# Patient Record
Sex: MALE | Race: OTHER | HISPANIC OR LATINO
[De-identification: names, ages, dates, MRNs, and addresses within clinical notes are randomized per-mention and may not be internally consistent; named-entity substitution may affect disease eponyms.]

---

## 2017-01-06 ENCOUNTER — APPOINTMENT (OUTPATIENT)
Dept: PULMONOLOGY | Facility: CLINIC | Age: 62
End: 2017-01-06

## 2017-01-06 VITALS
BODY MASS INDEX: 46.28 KG/M2 | SYSTOLIC BLOOD PRESSURE: 119 MMHG | HEART RATE: 85 BPM | HEIGHT: 66 IN | WEIGHT: 288 LBS | DIASTOLIC BLOOD PRESSURE: 83 MMHG

## 2017-01-06 DIAGNOSIS — G47.33 OBSTRUCTIVE SLEEP APNEA (ADULT) (PEDIATRIC): ICD-10-CM

## 2017-01-06 DIAGNOSIS — F17.200 NICOTINE DEPENDENCE, UNSPECIFIED, UNCOMPLICATED: ICD-10-CM

## 2017-01-06 RX ORDER — METFORMIN HYDROCHLORIDE 500 MG/1
500 TABLET, COATED ORAL
Refills: 0 | Status: ACTIVE | COMMUNITY

## 2017-01-06 RX ORDER — PRAVASTATIN SODIUM 40 MG/1
40 TABLET ORAL
Refills: 0 | Status: ACTIVE | COMMUNITY

## 2017-01-06 RX ORDER — LOSARTAN POTASSIUM AND HYDROCHLOROTHIAZIDE 12.5; 5 MG/1; MG/1
50-12.5 TABLET ORAL
Refills: 0 | Status: ACTIVE | COMMUNITY

## 2017-01-06 RX ORDER — AMLODIPINE BESYLATE 10 MG/1
10 TABLET ORAL
Refills: 0 | Status: ACTIVE | COMMUNITY

## 2017-03-07 ENCOUNTER — APPOINTMENT (OUTPATIENT)
Dept: PODIATRY | Facility: CLINIC | Age: 62
End: 2017-03-07

## 2017-03-07 DIAGNOSIS — G89.29 LOW BACK PAIN: ICD-10-CM

## 2017-03-07 DIAGNOSIS — M54.5 LOW BACK PAIN: ICD-10-CM

## 2017-03-07 DIAGNOSIS — I83.93 ASYMPTOMATIC VARICOSE VEINS OF BILATERAL LOWER EXTREMITIES: ICD-10-CM

## 2017-03-07 DIAGNOSIS — E08.9 DIABETES MELLITUS DUE TO UNDERLYING CONDITION W/OUT COMPLICATIONS: ICD-10-CM

## 2017-03-07 DIAGNOSIS — B35.1 TINEA UNGUIUM: ICD-10-CM

## 2017-03-07 DIAGNOSIS — L85.3 XEROSIS CUTIS: ICD-10-CM

## 2017-03-07 RX ORDER — HYDROCORTISONE 1 %
12 CREAM (GRAM) TOPICAL
Qty: 1 | Refills: 3 | Status: ACTIVE | COMMUNITY
Start: 2017-03-07 | End: 1900-01-01

## 2017-03-07 RX ORDER — NYSTATIN 100000 [USP'U]/G
100000 CREAM TOPICAL
Qty: 1 | Refills: 3 | Status: ACTIVE | COMMUNITY
Start: 2017-03-07 | End: 1900-01-01

## 2017-05-12 PROBLEM — J44.9 CHRONIC OBSTRUCTIVE PULMONARY DISEASE, UNSPECIFIED COPD TYPE: Status: ACTIVE | Noted: 2017-05-12

## 2017-05-19 ENCOUNTER — APPOINTMENT (OUTPATIENT)
Dept: PULMONOLOGY | Facility: CLINIC | Age: 62
End: 2017-05-19

## 2017-05-19 DIAGNOSIS — J44.9 CHRONIC OBSTRUCTIVE PULMONARY DISEASE, UNSPECIFIED: ICD-10-CM

## 2017-06-13 ENCOUNTER — APPOINTMENT (OUTPATIENT)
Dept: PODIATRY | Facility: CLINIC | Age: 62
End: 2017-06-13

## 2017-06-13 ENCOUNTER — OUTPATIENT (OUTPATIENT)
Dept: OUTPATIENT SERVICES | Facility: HOSPITAL | Age: 62
LOS: 1 days | Discharge: HOME | End: 2017-06-13

## 2017-06-13 RX ORDER — AMMONIUM LACTATE 12 %
12 CREAM (GRAM) TOPICAL TWICE DAILY
Qty: 1 | Refills: 3 | Status: ACTIVE | COMMUNITY
Start: 2017-06-13 | End: 1900-01-01

## 2017-06-28 DIAGNOSIS — B35.1 TINEA UNGUIUM: ICD-10-CM

## 2017-07-28 ENCOUNTER — APPOINTMENT (OUTPATIENT)
Dept: PULMONOLOGY | Facility: CLINIC | Age: 62
End: 2017-07-28

## 2017-10-27 ENCOUNTER — APPOINTMENT (OUTPATIENT)
Dept: PULMONOLOGY | Facility: CLINIC | Age: 62
End: 2017-10-27

## 2018-03-23 ENCOUNTER — APPOINTMENT (OUTPATIENT)
Dept: PULMONOLOGY | Facility: CLINIC | Age: 63
End: 2018-03-23

## 2018-03-23 ENCOUNTER — OUTPATIENT (OUTPATIENT)
Dept: OUTPATIENT SERVICES | Facility: HOSPITAL | Age: 63
LOS: 1 days | Discharge: HOME | End: 2018-03-23

## 2018-03-23 VITALS
BODY MASS INDEX: 46.61 KG/M2 | SYSTOLIC BLOOD PRESSURE: 117 MMHG | WEIGHT: 290 LBS | HEART RATE: 75 BPM | HEIGHT: 66 IN | DIASTOLIC BLOOD PRESSURE: 75 MMHG

## 2018-03-23 DIAGNOSIS — Z87.891 PERSONAL HISTORY OF NICOTINE DEPENDENCE: ICD-10-CM

## 2018-03-23 RX ORDER — ALBUTEROL SULFATE 90 UG/1
108 (90 BASE) AEROSOL, METERED RESPIRATORY (INHALATION) EVERY 4 HOURS
Qty: 1 | Refills: 2 | Status: ACTIVE | COMMUNITY
Start: 2018-03-23 | End: 1900-01-01

## 2018-03-30 ENCOUNTER — OUTPATIENT (OUTPATIENT)
Dept: OUTPATIENT SERVICES | Facility: HOSPITAL | Age: 63
LOS: 1 days | Discharge: HOME | End: 2018-03-30

## 2018-03-30 DIAGNOSIS — F17.210 NICOTINE DEPENDENCE, CIGARETTES, UNCOMPLICATED: ICD-10-CM

## 2018-04-14 ENCOUNTER — OUTPATIENT (OUTPATIENT)
Dept: OUTPATIENT SERVICES | Facility: HOSPITAL | Age: 63
LOS: 1 days | Discharge: HOME | End: 2018-04-14

## 2018-04-16 DIAGNOSIS — G47.33 OBSTRUCTIVE SLEEP APNEA (ADULT) (PEDIATRIC): ICD-10-CM

## 2021-05-07 ENCOUNTER — INPATIENT (INPATIENT)
Facility: HOSPITAL | Age: 66
LOS: 1 days | Discharge: HOME HEALTH | End: 2021-05-09
Attending: INTERNAL MEDICINE | Admitting: INTERNAL MEDICINE
Payer: MEDICARE

## 2021-05-07 VITALS
DIASTOLIC BLOOD PRESSURE: 72 MMHG | OXYGEN SATURATION: 100 % | HEART RATE: 72 BPM | SYSTOLIC BLOOD PRESSURE: 130 MMHG | HEIGHT: 68 IN | WEIGHT: 291.89 LBS | RESPIRATION RATE: 20 BRPM | TEMPERATURE: 98 F

## 2021-05-07 LAB
ALBUMIN SERPL ELPH-MCNC: 4.5 G/DL — SIGNIFICANT CHANGE UP (ref 3.5–5.2)
ALP SERPL-CCNC: 92 U/L — SIGNIFICANT CHANGE UP (ref 30–115)
ALT FLD-CCNC: 28 U/L — SIGNIFICANT CHANGE UP (ref 0–41)
ANION GAP SERPL CALC-SCNC: 13 MMOL/L — SIGNIFICANT CHANGE UP (ref 7–14)
ANION GAP SERPL CALC-SCNC: 15 MMOL/L — HIGH (ref 7–14)
ANISOCYTOSIS BLD QL: SIGNIFICANT CHANGE UP
APTT BLD: 32.7 SEC — SIGNIFICANT CHANGE UP (ref 27–39.2)
AST SERPL-CCNC: 30 U/L — SIGNIFICANT CHANGE UP (ref 0–41)
BASOPHILS # BLD AUTO: 0 K/UL — SIGNIFICANT CHANGE UP (ref 0–0.2)
BASOPHILS NFR BLD AUTO: 0 % — SIGNIFICANT CHANGE UP (ref 0–1)
BILIRUB SERPL-MCNC: 0.6 MG/DL — SIGNIFICANT CHANGE UP (ref 0.2–1.2)
BLD GP AB SCN SERPL QL: SIGNIFICANT CHANGE UP
BUN SERPL-MCNC: 20 MG/DL — SIGNIFICANT CHANGE UP (ref 10–20)
BUN SERPL-MCNC: 21 MG/DL — HIGH (ref 10–20)
CALCIUM SERPL-MCNC: 9.1 MG/DL — SIGNIFICANT CHANGE UP (ref 8.5–10.1)
CALCIUM SERPL-MCNC: 9.3 MG/DL — SIGNIFICANT CHANGE UP (ref 8.5–10.1)
CHLORIDE SERPL-SCNC: 100 MMOL/L — SIGNIFICANT CHANGE UP (ref 98–110)
CHLORIDE SERPL-SCNC: 99 MMOL/L — SIGNIFICANT CHANGE UP (ref 98–110)
CO2 SERPL-SCNC: 24 MMOL/L — SIGNIFICANT CHANGE UP (ref 17–32)
CO2 SERPL-SCNC: 28 MMOL/L — SIGNIFICANT CHANGE UP (ref 17–32)
CREAT SERPL-MCNC: 1 MG/DL — SIGNIFICANT CHANGE UP (ref 0.7–1.5)
CREAT SERPL-MCNC: 1 MG/DL — SIGNIFICANT CHANGE UP (ref 0.7–1.5)
EOSINOPHIL # BLD AUTO: 0.29 K/UL — SIGNIFICANT CHANGE UP (ref 0–0.7)
EOSINOPHIL NFR BLD AUTO: 3.5 % — SIGNIFICANT CHANGE UP (ref 0–8)
GIANT PLATELETS BLD QL SMEAR: PRESENT — SIGNIFICANT CHANGE UP
GLUCOSE SERPL-MCNC: 129 MG/DL — HIGH (ref 70–99)
GLUCOSE SERPL-MCNC: 136 MG/DL — HIGH (ref 70–99)
HCT VFR BLD CALC: 46.8 % — SIGNIFICANT CHANGE UP (ref 42–52)
HGB BLD-MCNC: 14.5 G/DL — SIGNIFICANT CHANGE UP (ref 14–18)
INR BLD: 1.08 RATIO — SIGNIFICANT CHANGE UP (ref 0.65–1.3)
IRON SATN MFR SERPL: 12 % — LOW (ref 15–50)
IRON SATN MFR SERPL: 38 UG/DL — SIGNIFICANT CHANGE UP (ref 35–150)
LDH SERPL L TO P-CCNC: 224 U/L — SIGNIFICANT CHANGE UP (ref 50–242)
LYMPHOCYTES # BLD AUTO: 2.31 K/UL — SIGNIFICANT CHANGE UP (ref 1.2–3.4)
LYMPHOCYTES # BLD AUTO: 28.1 % — SIGNIFICANT CHANGE UP (ref 20.5–51.1)
MANUAL SMEAR VERIFICATION: SIGNIFICANT CHANGE UP
MCHC RBC-ENTMCNC: 22.9 PG — LOW (ref 27–31)
MCHC RBC-ENTMCNC: 31 G/DL — LOW (ref 32–37)
MCV RBC AUTO: 73.8 FL — LOW (ref 80–94)
MICROCYTES BLD QL: SIGNIFICANT CHANGE UP
MONOCYTES # BLD AUTO: 0.29 K/UL — SIGNIFICANT CHANGE UP (ref 0.1–0.6)
MONOCYTES NFR BLD AUTO: 3.5 % — SIGNIFICANT CHANGE UP (ref 1.7–9.3)
NEUTROPHILS # BLD AUTO: 5.34 K/UL — SIGNIFICANT CHANGE UP (ref 1.4–6.5)
NEUTROPHILS NFR BLD AUTO: 64 % — SIGNIFICANT CHANGE UP (ref 42.2–75.2)
NEUTS BAND # BLD: 0.9 % — SIGNIFICANT CHANGE UP (ref 0–6)
OVALOCYTES BLD QL SMEAR: SLIGHT — SIGNIFICANT CHANGE UP
PLAT MORPH BLD: ABNORMAL
PLATELET # BLD AUTO: 5 K/UL — CRITICAL LOW (ref 130–400)
POIKILOCYTOSIS BLD QL AUTO: SLIGHT — SIGNIFICANT CHANGE UP
POLYCHROMASIA BLD QL SMEAR: SLIGHT — SIGNIFICANT CHANGE UP
POTASSIUM SERPL-MCNC: 4.1 MMOL/L — SIGNIFICANT CHANGE UP (ref 3.5–5)
POTASSIUM SERPL-MCNC: 4.2 MMOL/L — SIGNIFICANT CHANGE UP (ref 3.5–5)
POTASSIUM SERPL-SCNC: 4.1 MMOL/L — SIGNIFICANT CHANGE UP (ref 3.5–5)
POTASSIUM SERPL-SCNC: 4.2 MMOL/L — SIGNIFICANT CHANGE UP (ref 3.5–5)
PROT SERPL-MCNC: 7.5 G/DL — SIGNIFICANT CHANGE UP (ref 6–8)
PROTHROM AB SERPL-ACNC: 12.4 SEC — SIGNIFICANT CHANGE UP (ref 9.95–12.87)
RBC # BLD: 6.24 M/UL — HIGH (ref 4.7–6.1)
RBC # BLD: 6.34 M/UL — HIGH (ref 4.7–6.1)
RBC # FLD: 17.6 % — HIGH (ref 11.5–14.5)
RBC BLD AUTO: ABNORMAL
RETICS #: 98.6 K/UL — SIGNIFICANT CHANGE UP (ref 25–125)
RETICS/RBC NFR: 1.6 % — HIGH (ref 0.5–1.5)
SARS-COV-2 RNA SPEC QL NAA+PROBE: SIGNIFICANT CHANGE UP
SODIUM SERPL-SCNC: 139 MMOL/L — SIGNIFICANT CHANGE UP (ref 135–146)
SODIUM SERPL-SCNC: 140 MMOL/L — SIGNIFICANT CHANGE UP (ref 135–146)
TIBC SERPL-MCNC: 316 UG/DL — SIGNIFICANT CHANGE UP (ref 220–430)
UIBC SERPL-MCNC: 278 UG/DL — SIGNIFICANT CHANGE UP (ref 110–370)
WBC # BLD: 8.23 K/UL — SIGNIFICANT CHANGE UP (ref 4.8–10.8)
WBC # FLD AUTO: 8.23 K/UL — SIGNIFICANT CHANGE UP (ref 4.8–10.8)

## 2021-05-07 PROCEDURE — 70450 CT HEAD/BRAIN W/O DYE: CPT | Mod: 26

## 2021-05-07 PROCEDURE — 99223 1ST HOSP IP/OBS HIGH 75: CPT

## 2021-05-07 PROCEDURE — 99285 EMERGENCY DEPT VISIT HI MDM: CPT

## 2021-05-07 PROCEDURE — 76705 ECHO EXAM OF ABDOMEN: CPT | Mod: 26

## 2021-05-07 PROCEDURE — 88189 FLOWCYTOMETRY/READ 16 & >: CPT

## 2021-05-07 RX ORDER — CHLORHEXIDINE GLUCONATE 213 G/1000ML
1 SOLUTION TOPICAL
Refills: 0 | Status: DISCONTINUED | OUTPATIENT
Start: 2021-05-07 | End: 2021-05-09

## 2021-05-07 RX ORDER — METFORMIN HYDROCHLORIDE 850 MG/1
0 TABLET ORAL
Qty: 0 | Refills: 0 | DISCHARGE

## 2021-05-07 RX ORDER — PANTOPRAZOLE SODIUM 20 MG/1
40 TABLET, DELAYED RELEASE ORAL
Refills: 0 | Status: DISCONTINUED | OUTPATIENT
Start: 2021-05-07 | End: 2021-05-09

## 2021-05-07 RX ORDER — HYDROCHLOROTHIAZIDE 25 MG
25 TABLET ORAL DAILY
Refills: 0 | Status: DISCONTINUED | OUTPATIENT
Start: 2021-05-07 | End: 2021-05-09

## 2021-05-07 RX ORDER — IMMUNE GLOBULIN (HUMAN) 10 G/100ML
95 INJECTION INTRAVENOUS; SUBCUTANEOUS ONCE
Refills: 0 | Status: COMPLETED | OUTPATIENT
Start: 2021-05-07 | End: 2021-05-07

## 2021-05-07 RX ORDER — LOSARTAN POTASSIUM 100 MG/1
100 TABLET, FILM COATED ORAL DAILY
Refills: 0 | Status: DISCONTINUED | OUTPATIENT
Start: 2021-05-07 | End: 2021-05-09

## 2021-05-07 RX ORDER — ASPIRIN/CALCIUM CARB/MAGNESIUM 324 MG
81 TABLET ORAL DAILY
Refills: 0 | Status: DISCONTINUED | OUTPATIENT
Start: 2021-05-07 | End: 2021-05-09

## 2021-05-07 RX ORDER — AMLODIPINE BESYLATE 2.5 MG/1
0 TABLET ORAL
Qty: 0 | Refills: 0 | DISCHARGE

## 2021-05-07 RX ORDER — ATORVASTATIN CALCIUM 80 MG/1
0 TABLET, FILM COATED ORAL
Qty: 0 | Refills: 0 | DISCHARGE

## 2021-05-07 RX ORDER — FLUTICASONE PROPIONATE 50 MCG
1 SPRAY, SUSPENSION NASAL DAILY
Refills: 0 | Status: DISCONTINUED | OUTPATIENT
Start: 2021-05-07 | End: 2021-05-09

## 2021-05-07 RX ORDER — LOSARTAN POTASSIUM 100 MG/1
0 TABLET, FILM COATED ORAL
Qty: 0 | Refills: 0 | DISCHARGE

## 2021-05-07 RX ORDER — HYDROCHLOROTHIAZIDE 25 MG
0 TABLET ORAL
Qty: 0 | Refills: 0 | DISCHARGE

## 2021-05-07 RX ORDER — ATORVASTATIN CALCIUM 80 MG/1
40 TABLET, FILM COATED ORAL DAILY
Refills: 0 | Status: DISCONTINUED | OUTPATIENT
Start: 2021-05-07 | End: 2021-05-09

## 2021-05-07 RX ORDER — AMLODIPINE BESYLATE 2.5 MG/1
5 TABLET ORAL DAILY
Refills: 0 | Status: DISCONTINUED | OUTPATIENT
Start: 2021-05-07 | End: 2021-05-09

## 2021-05-07 RX ADMIN — IMMUNE GLOBULIN (HUMAN) 158.33 GRAM(S): 10 INJECTION INTRAVENOUS; SUBCUTANEOUS at 23:18

## 2021-05-07 RX ADMIN — Medication 130 MILLIGRAM(S): at 21:52

## 2021-05-07 NOTE — H&P ADULT - ATTENDING COMMENTS
HPI:  66 years old male with PMH of COPD (not on home O2), MAICOL (off CPAP) , T2DM, HTN, DLD, referred by PMD for low platelet count. Outpatient labs noted thrombocytopenia of 8, found to be 5 on admission. June 2020 noted to be 100 and in Jan 2021 noted to be 53. He is asymptomatic. No reports of bleeding or easy bruising. No family history of cancer, thinks his niece may have lupus but otherwise brothers and sisters reported healthy.   he has lost some weight, 4 lbs over the last 3-4 months but it has been intentional.     Heme Onc following, concerned for ITP. given IVIG and prednisone 130mg. Peripheral smear confirmed thrombocytopenia, occasional schistocytes (non-specific). CTH and US LUQ performed and pending.       REVIEW OF SYSTEMS:  CONSTITUTIONAL:  No weakness, fevers, chills, night sweats, weight loss  EYES/ENT: No visual changes. No vertigo or dysphagia  NECK: No neck pain or stiffness  RESPIRATORY: No cough, wheezing, hemoptysis. No shortness of breath  CARDIOVASCULAR: No chest pain or palpitations. No lower extremity edema  GASTROINTESTINAL: No abdominal pain. No nausea, vomiting, diarrhea, or hematemesis  GENITOURINARY: No dysuria or hematuria   NEUROLOGICAL: No focal numbness or weakness  SKIN: No rashes or itching  HEMATOLOGIC: No easy bruising or prolonged bleeding.      PHYSICAL EXAM:  GENERAL: NAD, obese, Non-toxic, stated age   HEAD:  Atraumatic, Normocephalic  EYES: EOMI, Sclera White   NECK: Supple, No JVD  CHEST/LUNG: Clear to auscultation bilaterally; No wheezing, rhonchi, or crackles  HEART: Regular rate and rhythm; s1, s2, No murmurs, rubs, or gallops  ABDOMEN: Soft, Nontender, distended (but at baseline); Bowel sounds present, No rebound or guarding noted   EXTREMITIES:  No lower extremity edema or calf tenderness to palpation.  No clubbing or cyanosis  PSYCH: AAOx3, pleasant, cooperative, not anxious  NEUROLOGY: non-focal  SKIN: No rashes or lesions. Venous stasis dermatitis and dry skin on b/l legs.       ASSESSMENT AND PLAN:  Thrombocytopenia: suspected ITP, s/p IVIG and prednisone.   -Heme-Onc following: Send LDH, Hapto, ret count, emelia test  -Check HIV, Hep B and Hep C  -Check HORACIO, RF  -Check EBV, CMV, Parvovirus . Also send peripheral flow cytometry   -Check heparin PF4 Ab   -f/u CTH and LUQ Sono  -Trend CBC, monitor closely for bleeding. Hold off on plt transfusion for now, unless actively bleeding.     COPD: not in an exacerbation, cont albuterol PRN     HTN/ HLD: cont with home medications     MAICOL: patient should follow up with Sleep medicine as an out patient    DVT ppx: Sequentials for now until platelet count improves.   GI ppx: Not indicated  GOC: Full code.    My note supersedes the residents in the event of a discrepancy.

## 2021-05-07 NOTE — H&P ADULT - HISTORY OF PRESENT ILLNESS
66 years old male with PMH of DM, HTN, DLD, MAICOL referred by PMD for low platelet count.  Plt count was 8 on blood work that was done yesterday. Patient has no complaints of bleeding - nose bleeds, melena , skin rash/ bleeding spots. No h/o recent infections. No recent change in medications. No OTC meds. No recent fevers, weight loss.   He received his Moderna 2 dose COVID vaccine in Jan and feb 2021.     On review of HIE in June 2020 his plt count was low 100s and in Jan 2021 plt count was 53. WBC and Hb has been normal.   Patient is not aware of prior low platelet counts    In the ED, vitals were WNL, Labs were significant for platelet count of 5. peripheral smear showed normal RBC- normocytic, 1-2 schistocytes noted (non specific), some target cells. WBC- normal looking. No blasts and Platelet- decreased in number. No clumps noted. Some large platelets seen. Heme/onc consulted , Pt received IVIG and prednisone in ED. To be admitted under medicine for further workup.  66 years old male with PMH of COPD (not on home O2), MAICOL (off CPAP) , T2DM, HTN, DLD, referred by PMD for low platelet count.  Pt went to visit his PMD for routine out patient visit and was found to have plt count of 8. Pt reports that he has no prior history of thrombocytopenia and does not report any bruising or bleeding - nose bleeds, melena , skin rash/ bleeding spot. Has no family history of thrombocytopenia. Has been taking ibuprofen OTC recently for headaches.  He received his Moderna 2 dose COVID vaccine in Jan and feb 2021.   On review of HIE in June 2020 his plt count was low 100s and in Jan 2021 plt count was 53. WBC and Hb has been normal.  Patient is not aware of prior low platelet counts.  Denies any fever, n/v/d, SOB, chest pain or any recent weight changes.     In the ED, vitals were WNL, Labs were significant for platelet count of 5. peripheral smear showed normal RBC- normocytic, 1-2 schistocytes noted (non specific), some target cells. WBC- normal looking. No blasts and Platelet- decreased in number. No clumps noted. Some large platelets seen. Heme/onc consulted , Pt received IVIG and prednisone in ED. To be admitted under medicine for further workup.

## 2021-05-07 NOTE — ED PROVIDER NOTE - PROGRESS NOTE DETAILS
d/w hemonc, advised 1 unit for now. will check smear and call back with any further recommendations as per hemonc, cancel plat transfusion. will contact inpt team for further rec

## 2021-05-07 NOTE — CONSULT NOTE ADULT - ASSESSMENT
67 y/o male with PMH of DM, HTN, DLD, MACIOL referred by PMD for low platelet count of 8 on blood work that was done yesterday. Repeat CBC in ER today shows platelet count of 5.     He got Moderna COVID vaccine in Jan and Feb 2021.   On review of HIE in June 2020 his plt count was low 100s and in Jan 2021 plt count was 53. WBC and Hb has been normal.       1. Thrombocytopenia with normal WBC and Hb- likely 2/2 to ITP.   Peripheral smear reviewed- shows normal RBC- normocytic, 1-2 schistocytes noted (non specific), some target cells.   WBC- normal looking. No blasts.  Platelet- decreased in number. No clumps noted. Some large platelets seen.     Please get CT head to r/o bleed  No need for platelet transfusion unless patient is actively bleeding .     Please send LDH, Hapto, ret count, emelia test  Check HIV, Hep B and Hep C  Check HORACIO, RF  Check EBV, CMV, Parvovirus . Also send peripheral flow cytometry   Order US abdomen to r/o splenomegaly.   Also check heparin PF4 Ab     Will start him on prednisone 1mg/kg- 130 mg PO with protonix 40   Will give him 1 dose IVIG 1g/kg today     Plan was discussed with Medical admitting resident   Repeat CBC tomorrow.

## 2021-05-07 NOTE — ED PROVIDER NOTE - CLINICAL SUMMARY MEDICAL DECISION MAKING FREE TEXT BOX
patient with severe thrombocytopenia. patient transfused platelets. hematology consulted. patient to be admitted for evaluation of low platelets.

## 2021-05-07 NOTE — ED PROVIDER NOTE - OBJECTIVE STATEMENT
pt with pmhx htn, dm, hyperlip, obesity presents to ED for low plat count incidentally noted on routine labs with pmd yesterday. Denies fever/chill/HA/dizziness/chest pain/palpitation/sob/abd pain/n/v/d/ black stool/bloody stool/urinary sxs. completed covid19 vaccine series in 2/2021. per chelita umaña, labs since last year showed downward trending plat count

## 2021-05-07 NOTE — H&P ADULT - NSHPPHYSICALEXAM_GEN_ALL_CORE
PHYSICAL EXAM:  GENERAL: NAD, lying in bed comfortably  HEAD:  Atraumatic, Normocephalic  EYES: EOMI, PERRLA, conjunctiva and sclera clear  ENT: Moist mucous membranes  NECK: Supple, No JVD  CHEST/LUNG: Clear to auscultation bilaterally; No rales, rhonchi, wheezing, or rubs. Unlabored respirations  HEART: Regular rate and rhythm; No murmurs, rubs, or gallops  ABDOMEN: Bowel sounds present; Soft, Nontender, Nondistended. No hepatomegally  EXTREMITIES:  2+ Peripheral Pulses, brisk capillary refill. No clubbing, cyanosis, or edema  NERVOUS SYSTEM:  Alert & Oriented X3, speech clear. No deficits   MSK: FROM all 4 extremities, full and equal strength  SKIN: No rashes or lesions PHYSICAL EXAM:  GENERAL: NAD, lying in bed comfortably  HEAD:  Atraumatic, Normocephalic  EYES: EOMI, PERRLA, conjunctiva and sclera clear  ENT: Moist mucous membranes  NECK: Supple, No JVD  CHEST/LUNG: Clear to auscultation bilaterally; No rales, rhonchi, wheezing, or rubs. Unlabored respirations  HEART: Regular rate and rhythm; No murmurs, rubs, or gallops  ABDOMEN: Bowel sounds present; Soft, Nontender, Nondistended. No hepatomegally  EXTREMITIES:  2+ Peripheral Pulses, brisk capillary refill. No clubbing, cyanosis, or edema, chronic vernous stasis b/l legs  NERVOUS SYSTEM:  Alert & Oriented X3, speech clear. No deficits   MSK: FROM all 4 extremities, full and equal strength  SKIN: No rashes or lesions

## 2021-05-07 NOTE — H&P ADULT - NSHPLABSRESULTS_GEN_ALL_CORE
(05-07 @ 16:38)                      14.5  8.23 )-----------( 5                 46.8    Neutrophils = 5.34 (64.0%)  Lymphocytes = 2.31 (28.1%)  Eosinophils = 0.29 (3.5%)  Basophils = 0.00 (0.0%)  Monocytes = 0.29 (3.5%)  Bands = 0.9%    05-07    139  |  100  |  21<H>  ----------------------------<  129<H>  4.1   |  24  |  1.0    Ca    9.1      07 May 2021 16:38      ( 07 May 2021 16:38 )   PT: 12.40 sec;   INR: 1.08 ratio;       PTT:32.7 sec

## 2021-05-07 NOTE — ED PROVIDER NOTE - ATTENDING CONTRIBUTION TO CARE
patient sent for thrombocytopenia with platelets of 8. he is asymptomatic, had 3 month fu with pmd who had repeat labs which showed abn and was told tocome to ed. denies headache, vomiting, bruising, hematuria, blood in stool or swelling. denies new meds, not on aspirin anymore.  plan is to obtain labs and consult heme onc.

## 2021-05-07 NOTE — H&P ADULT - ASSESSMENT
Impression:  Thrombocytopenia likely secondary to ITP  HO HTN / DLD  HO T2DM    # Thrombocytopenia   - DDX: ITP, DITP, chronic alcoholism, infectious, nutritional deficiency  - no plt clumping on peripheral smear  - will get HIV, HCV, HBV  for ITP  - Check HORACIO, RF to r/o CTD  - LDH, Hapto, ret count, emelia test to r/o anemia or thrombotic microangiopathy   - Check EBV, CMV, Parvovirus  to r/o infectious  - heparin PF4 Ab to r/o HIT as per heme/onc  - prednisone 1mg/kg  - 130mg PO w/ protonix 40mg PO qd  - IVIG as per heme/onc  - get CTH to r/o hemorrhage  - transfuse platelets if signs of bleeding, hold of transfusion till then  - avoid heparin  - f./u CBC q12.    # HO HTN / DLD  # HOT2D --  hold oral meds;  check fs;  start and adjust insulin s/s prn     # Diet: DASH / TLC  # DVT Prophylaxis: SCD  # GI Prophylaxis: Protonix  # Activity: IAT  # IV Fluids:  # Dispo: Acute  # Code Status: fullcode  # CHG wash  66 years old male with PMH of COPD (not on home O2), MAICOL (off CPAP) , T2DM, HTN, DLD, referred by PMD for low platelet count.    Impression:  Thrombocytopenia likely secondary to ITP  HO HTN / DLD  HO T2DM    # Thrombocytopenia   - DDX: ITP, DITP, chronic alcoholism, infectious, nutritional deficiency  - no plt clumping on peripheral smear  - will get HIV, HCV, HBV  for ITP  - Check HORACIO, RF to r/o CTD  - LDH, Hapto, ret count, emelia test to r/o anemia or thrombotic microangiopathy   - Check EBV, CMV, Parvovirus  to r/o infectious  - get vitb12 and folate  - heparin PF4 Ab to r/o HIT as per heme/onc  - prednisone 1mg/kg  - 130mg PO w/ protonix 40mg PO qd  - IVIG as per heme/onc  - get CTH to r/o hemorrhage  - transfuse platelets if signs of bleeding, hold of transfusion till then  - avoid heparin  - f./u CBC q12.    # HO HTN / DLD - c/w HCTZ, losartan, amlodipine and statin  # HO T2DM --  hold oral meds;  check fs;  start and adjust insulin s/s prn     # Diet: DASH / TLC  # DVT Prophylaxis: SCD  # GI Prophylaxis: Protonix  # Activity: IAT  # Dispo: Acute  # Code Status: fullcode

## 2021-05-07 NOTE — CONSULT NOTE ADULT - SUBJECTIVE AND OBJECTIVE BOX
Patient is a 66y old  Male who presents with a chief complaint of     HPI: 65 y/o male with PMH of DM, HTN, DLD, MAICOL referred by PMD for low platelet count of 8 on blood work that was done yesterday. Patient has no complaints of bleeding - nose bleeds, melena , skin rash/ bleeding spots. No h/o recent infections. No recent change in medications. No OTC meds. No recent fevers, weight loss.   He received his Moderna 2 dose COVID vaccine in Jan and feb 2021.     On review of HIE in June 2020 his plt count was low 100s and in Jan 2021 plt count was 53. WBC and Hb has been normal.   Patient is not aware of prior low platelet counts.        ROS:  Negative except for:    PAST MEDICAL & SURGICAL HISTORY:      SOCIAL HISTORY: Active smoker  No alcohol abuse- rarely drinks alcohol   No recreational drug use    FAMILY HISTORY: No malignancy or blood related problems known in family       MEDICATIONS  (STANDING):    MEDICATIONS  (PRN):      Weight (kg): 132.4 (05-07-21 @ 15:39)  Allergies    Allergy Status Unknown    Intolerances        Vital Signs Last 24 Hrs  T(C): 36.8 (07 May 2021 15:39), Max: 36.8 (07 May 2021 15:39)  T(F): 98.3 (07 May 2021 15:39), Max: 98.3 (07 May 2021 15:39)  HR: 72 (07 May 2021 15:39) (72 - 72)  BP: 130/72 (07 May 2021 15:39) (130/72 - 130/72)  BP(mean): --  RR: 20 (07 May 2021 15:39) (20 - 20)  SpO2: 100% (07 May 2021 15:39) (100% - 100%)    PHYSICAL EXAM  General: adult in NAD, Obese   ORAL: No bleeding spots noted   Neck: supple  CV: normal S1/S2 with no murmur rubs or gallops  Lungs: positive air movement b/l ant lungs  Abdomen: soft non-tender. No hepatosplenomegaly  Ext: no clubbing cyanosis or edema  Skin: no rashes and no petechiae were seen   Neuro: alert and oriented X 4, no focal deficits      LABS:                          14.5   8.23  )-----------( 5        ( 07 May 2021 16:38 )             46.8         Mean Cell Volume : 73.8 fL  Mean Cell Hemoglobin : 22.9 pg  Mean Cell Hemoglobin Concentration : 31.0 g/dL  Auto Neutrophil # : 5.34 K/uL  Auto Lymphocyte # : 2.31 K/uL  Auto Monocyte # : 0.29 K/uL  Auto Eosinophil # : 0.29 K/uL  Auto Basophil # : 0.00 K/uL  Auto Neutrophil % : 64.0 %  Auto Lymphocyte % : 28.1 %  Auto Monocyte % : 3.5 %  Auto Eosinophil % : 3.5 %  Auto Basophil % : 0.0 %      Serial CBC's  05-07 @ 16:38  Hct-46.8 / Hgb-14.5 / Plat-5 / RBC-6.34 / WBC-8.23      05-07    139  |  100  |  21<H>  ----------------------------<  129<H>  4.1   |  24  |  1.0    Ca    9.1      07 May 2021 16:38        PT/INR - ( 07 May 2021 16:38 )   PT: 12.40 sec;   INR: 1.08 ratio         PTT - ( 07 May 2021 16:38 )  PTT:32.7 sec

## 2021-05-07 NOTE — ED PROVIDER NOTE - PHYSICAL EXAMINATION
CONSTITUTIONAL: Well-appearing; well-nourished; in no apparent distress.   CARDIOVASCULAR: Normal S1, S2; no murmurs, rubs, or gallops.   RESPIRATORY: Normal chest excursion with respiration; breath sounds clear and equal bilaterally; no wheezes, rhonchi, or rales.  GI/: non-distended; non-tender; no palpable organomegaly.   MS: No evidence of trauma or deformity. Non-tender to palpation.   SKIN: Normal for age and race; warm; dry; good turgor; no apparent lesions or exudate.   NEURO/PSYCH: A & O x 4; grossly unremarkable. mood and manner are appropriate.

## 2021-05-08 LAB
A1C WITH ESTIMATED AVERAGE GLUCOSE RESULT: 7.8 % — HIGH (ref 4–5.6)
ALBUMIN SERPL ELPH-MCNC: 3.7 G/DL — SIGNIFICANT CHANGE UP (ref 3.5–5.2)
ALP SERPL-CCNC: 79 U/L — SIGNIFICANT CHANGE UP (ref 30–115)
ALT FLD-CCNC: 26 U/L — SIGNIFICANT CHANGE UP (ref 0–41)
ANION GAP SERPL CALC-SCNC: 13 MMOL/L — SIGNIFICANT CHANGE UP (ref 7–14)
APTT BLD: 31.1 SEC — SIGNIFICANT CHANGE UP (ref 27–39.2)
AST SERPL-CCNC: 25 U/L — SIGNIFICANT CHANGE UP (ref 0–41)
BASOPHILS # BLD AUTO: 0.05 K/UL — SIGNIFICANT CHANGE UP (ref 0–0.2)
BASOPHILS NFR BLD AUTO: 0.9 % — SIGNIFICANT CHANGE UP (ref 0–1)
BILIRUB SERPL-MCNC: 0.6 MG/DL — SIGNIFICANT CHANGE UP (ref 0.2–1.2)
BUN SERPL-MCNC: 23 MG/DL — HIGH (ref 10–20)
CALCIUM SERPL-MCNC: 8.7 MG/DL — SIGNIFICANT CHANGE UP (ref 8.5–10.1)
CHLORIDE SERPL-SCNC: 97 MMOL/L — LOW (ref 98–110)
CHOLEST SERPL-MCNC: 96 MG/DL — SIGNIFICANT CHANGE UP
CO2 SERPL-SCNC: 22 MMOL/L — SIGNIFICANT CHANGE UP (ref 17–32)
COVID-19 SPIKE DOMAIN AB INTERP: POSITIVE
COVID-19 SPIKE DOMAIN ANTIBODY RESULT: >250 U/ML — HIGH
CREAT SERPL-MCNC: 1 MG/DL — SIGNIFICANT CHANGE UP (ref 0.7–1.5)
EBV EA AB SER IA-ACNC: 8.4 U/ML — SIGNIFICANT CHANGE UP
EBV EA IGG SER-ACNC: NEGATIVE — SIGNIFICANT CHANGE UP
EBV PATRN SPEC IB-IMP: SIGNIFICANT CHANGE UP
EOSINOPHIL # BLD AUTO: 0.01 K/UL — SIGNIFICANT CHANGE UP (ref 0–0.7)
EOSINOPHIL NFR BLD AUTO: 0.2 % — SIGNIFICANT CHANGE UP (ref 0–8)
ESTIMATED AVERAGE GLUCOSE: 177 MG/DL — HIGH (ref 68–114)
FERRITIN SERPL-MCNC: 65 NG/ML — SIGNIFICANT CHANGE UP (ref 30–400)
FOLATE SERPL-MCNC: 4.9 NG/ML — SIGNIFICANT CHANGE UP
GLUCOSE SERPL-MCNC: 225 MG/DL — HIGH (ref 70–99)
HAPTOGLOB SERPL-MCNC: 194 MG/DL — SIGNIFICANT CHANGE UP (ref 34–200)
HAV IGM SER-ACNC: SIGNIFICANT CHANGE UP
HBV CORE IGM SER-ACNC: SIGNIFICANT CHANGE UP
HBV SURFACE AG SER-ACNC: SIGNIFICANT CHANGE UP
HCT VFR BLD CALC: 42.8 % — SIGNIFICANT CHANGE UP (ref 42–52)
HCV AB S/CO SERPL IA: 0.05 COI — SIGNIFICANT CHANGE UP
HCV AB S/CO SERPL IA: 0.09 S/CO — SIGNIFICANT CHANGE UP (ref 0–0.99)
HCV AB SERPL-IMP: SIGNIFICANT CHANGE UP
HCV AB SERPL-IMP: SIGNIFICANT CHANGE UP
HDLC SERPL-MCNC: 28 MG/DL — LOW
HEPARIN-PF4 AB RESULT: <0.6 U/ML — SIGNIFICANT CHANGE UP (ref 0–0.9)
HGB BLD-MCNC: 13.1 G/DL — LOW (ref 14–18)
HIV 1+2 AB+HIV1 P24 AG SERPL QL IA: SIGNIFICANT CHANGE UP
IMM GRANULOCYTES NFR BLD AUTO: 0.4 % — HIGH (ref 0.1–0.3)
INR BLD: 1.06 RATIO — SIGNIFICANT CHANGE UP (ref 0.65–1.3)
LIPID PNL WITH DIRECT LDL SERPL: 57 MG/DL — SIGNIFICANT CHANGE UP
LYMPHOCYTES # BLD AUTO: 0.65 K/UL — LOW (ref 1.2–3.4)
LYMPHOCYTES # BLD AUTO: 11.7 % — LOW (ref 20.5–51.1)
MAGNESIUM SERPL-MCNC: 1.7 MG/DL — LOW (ref 1.8–2.4)
MCHC RBC-ENTMCNC: 22.7 PG — LOW (ref 27–31)
MCHC RBC-ENTMCNC: 30.6 G/DL — LOW (ref 32–37)
MCV RBC AUTO: 74.3 FL — LOW (ref 80–94)
MONOCYTES # BLD AUTO: 0.08 K/UL — LOW (ref 0.1–0.6)
MONOCYTES NFR BLD AUTO: 1.4 % — LOW (ref 1.7–9.3)
NEUTROPHILS # BLD AUTO: 4.76 K/UL — SIGNIFICANT CHANGE UP (ref 1.4–6.5)
NEUTROPHILS NFR BLD AUTO: 85.4 % — HIGH (ref 42.2–75.2)
NON HDL CHOLESTEROL: 68 MG/DL — SIGNIFICANT CHANGE UP
NRBC # BLD: 0 /100 WBCS — SIGNIFICANT CHANGE UP (ref 0–0)
PF4 HEPARIN CMPLX AB SER-ACNC: NEGATIVE — SIGNIFICANT CHANGE UP
PLATELET # BLD AUTO: 10 K/UL — CRITICAL LOW (ref 130–400)
POTASSIUM SERPL-MCNC: 4.3 MMOL/L — SIGNIFICANT CHANGE UP (ref 3.5–5)
POTASSIUM SERPL-SCNC: 4.3 MMOL/L — SIGNIFICANT CHANGE UP (ref 3.5–5)
PROT SERPL-MCNC: 8.3 G/DL — HIGH (ref 6–8)
PROTHROM AB SERPL-ACNC: 12.2 SEC — SIGNIFICANT CHANGE UP (ref 9.95–12.87)
RBC # BLD: 5.76 M/UL — SIGNIFICANT CHANGE UP (ref 4.7–6.1)
RBC # BLD: 5.76 M/UL — SIGNIFICANT CHANGE UP (ref 4.7–6.1)
RBC # FLD: 17.4 % — HIGH (ref 11.5–14.5)
RETICS #: 91.6 K/UL — SIGNIFICANT CHANGE UP (ref 25–125)
RETICS/RBC NFR: 1.6 % — HIGH (ref 0.5–1.5)
RHEUMATOID FACT SERPL-ACNC: <10 IU/ML — SIGNIFICANT CHANGE UP (ref 0–13)
SARS-COV-2 IGG+IGM SERPL QL IA: >250 U/ML — HIGH
SARS-COV-2 IGG+IGM SERPL QL IA: POSITIVE
SODIUM SERPL-SCNC: 132 MMOL/L — LOW (ref 135–146)
TRIGL SERPL-MCNC: 64 MG/DL — SIGNIFICANT CHANGE UP
VIT B12 SERPL-MCNC: 373 PG/ML — SIGNIFICANT CHANGE UP (ref 232–1245)
WBC # BLD: 5.57 K/UL — SIGNIFICANT CHANGE UP (ref 4.8–10.8)
WBC # FLD AUTO: 5.57 K/UL — SIGNIFICANT CHANGE UP (ref 4.8–10.8)

## 2021-05-08 PROCEDURE — 99232 SBSQ HOSP IP/OBS MODERATE 35: CPT

## 2021-05-08 PROCEDURE — 99233 SBSQ HOSP IP/OBS HIGH 50: CPT

## 2021-05-08 RX ORDER — IMMUNE GLOBULIN (HUMAN) 10 G/100ML
95 INJECTION INTRAVENOUS; SUBCUTANEOUS ONCE
Refills: 0 | Status: COMPLETED | OUTPATIENT
Start: 2021-05-08 | End: 2021-05-08

## 2021-05-08 RX ADMIN — ATORVASTATIN CALCIUM 40 MILLIGRAM(S): 80 TABLET, FILM COATED ORAL at 11:16

## 2021-05-08 RX ADMIN — PANTOPRAZOLE SODIUM 40 MILLIGRAM(S): 20 TABLET, DELAYED RELEASE ORAL at 05:19

## 2021-05-08 RX ADMIN — IMMUNE GLOBULIN (HUMAN) 158.33 GRAM(S): 10 INJECTION INTRAVENOUS; SUBCUTANEOUS at 23:35

## 2021-05-08 RX ADMIN — Medication 81 MILLIGRAM(S): at 11:16

## 2021-05-08 RX ADMIN — Medication 1 SPRAY(S): at 11:16

## 2021-05-08 RX ADMIN — CHLORHEXIDINE GLUCONATE 1 APPLICATION(S): 213 SOLUTION TOPICAL at 05:18

## 2021-05-08 RX ADMIN — AMLODIPINE BESYLATE 5 MILLIGRAM(S): 2.5 TABLET ORAL at 05:19

## 2021-05-08 RX ADMIN — Medication 25 MILLIGRAM(S): at 08:33

## 2021-05-08 RX ADMIN — LOSARTAN POTASSIUM 100 MILLIGRAM(S): 100 TABLET, FILM COATED ORAL at 08:33

## 2021-05-08 NOTE — PROGRESS NOTE ADULT - ASSESSMENT
66 years old male with PMH of COPD (not on home O2), MAICOL (off CPAP) , T2DM, HTN, DLD, referred by PMD for low platelet count.    # Thrombocytopenia   - ITP? less likely related to Covid vaccine??   other differentials: chronic alcoholism, infectious, nutritional deficiency  - no plt clumping on peripheral smear  - will get HIV, HCV, HBV  for ITP  - Check HORACIO, RF to r/o CTD  - LDH, Hapto,emelia test to r/o anemia or thrombotic microangiopathy   - Check EBV, CMV, Parvovirus  to r/o infectious  - get vitb12 and folate  - heparin PF4 Ab to r/o HIT as per heme/onc  - CTH ruled out hemorrhage    - prednisone 1mg/kg  - 130mg PO w/ protonix 40mg PO qd  - s/p IVIG x 1 (5/8)  as per heme/onc  - transfuse platelets if signs of bleeding, hold of transfusion till then  - avoid heparin    5/8: PLatelets improved from 5 to 10. c/w PO PREdnisone 120  Lymphopenia noted. monitor    # HO HTN / DLD - c/w HCTZ, losartan, amlodipine and statin  # HO T2DM --  hold oral meds;  check fs;  SSI PRN (on orals at home)    # Diet: DASH / TLC  # DVT Prophylaxis: SCD  # GI Prophylaxis: Protonix  # Activity: IAT  # Dispo: Acute  # Code Status: fullcode

## 2021-05-08 NOTE — PROGRESS NOTE ADULT - ASSESSMENT
65 y/o male with PMH of DM, HTN, DLD, MAICOL referred by PMD for low platelet count of 8 on blood work that was done yesterday. Repeat CBC in ER today shows platelet count of 5.     He got Moderna COVID vaccine in Jan and Feb 2021.   On review of HIE in June 2020 his plt count was low 100s and in Jan 2021 plt count was 53. WBC and Hb has been normal.       1. Thrombocytopenia with normal WBC and Hb- likely 2/2 to ITP.   Peripheral smear reviewed- shows normal RBC- normocytic, 1-2 schistocytes noted (non specific), some target cells.   WBC- normal looking. No blasts.  Platelet- decreased in number. No clumps noted. Some large platelets seen.       No splenomegaly on US spleen.   No evidence of hemolysis- normal LDH, hapto and ret count.   HIV NR  No need for platelet transfusion unless patient is actively bleeding .       f.u  Hep B and Hep C,  HORACIO, RF  f/u EBV, CMV, Parvovirus   f.u peripheral flow cytometry   f/.u heparin PF4 Ab - unlikely       c/w prednisone  1mg/kg- 130 mg PO with protonix 40 - Day 2 today   He received 1 dose  IVIG 1g/kg on 5.7.21- Will do one dose today as well   Please monitor blood sugars as pt is on steroids. He will likely need insulin on DC for better control as he is expected to be on prednisone for a few weeks with slow taper based on his response     Repeat CBC tomorrow.     DVT ppx- SCD as pt has low plt  We can plan for DC once platelet counts continues to trend up.  67 y/o male with PMH of DM, HTN, DLD, MAICOL referred by PMD for low platelet count of 8 on blood work that was done yesterday. Repeat CBC in ER today shows platelet count of 5.     He got Moderna COVID vaccine in Jan and Feb 2021.   On review of HIE in June 2020 his plt count was low 100s and in Jan 2021 plt count was 53. WBC and Hb has been normal.       1. Thrombocytopenia with normal WBC and Hb- likely 2/2 to ITP.   Peripheral smear reviewed- shows normal RBC- normocytic, 1-2 schistocytes noted (non specific), some target cells.   WBC- normal looking. No blasts.  Platelet- decreased in number. No clumps noted. Some large platelets seen.       No splenomegaly on US spleen.   No evidence of hemolysis- normal LDH, hapto and ret count.   HIV NR  No need for platelet transfusion unless patient is actively bleeding .       f.u  Hep B and Hep C,  HORACIO, RF  f/u EBV, CMV, Parvovirus   f.u peripheral flow cytometry   f/.u heparin PF4 Ab - unlikely       c/w prednisone  1mg/kg- 130 mg PO with protonix 40 - Day 2 today   He received 1 dose  IVIG 1g/kg on 5.7.21- Will do 2nd dose 5.8.21 as well   Please monitor blood sugars as pt is on steroids. He will likely need insulin on DC for better control as he is expected to be on prednisone for a few weeks with slow taper based on his response     Repeat CBC tomorrow.     DVT ppx- SCD as pt has low plt  We can plan for DC once platelet counts continues to trend up.

## 2021-05-09 ENCOUNTER — TRANSCRIPTION ENCOUNTER (OUTPATIENT)
Age: 66
End: 2021-05-09

## 2021-05-09 VITALS
DIASTOLIC BLOOD PRESSURE: 74 MMHG | TEMPERATURE: 96 F | RESPIRATION RATE: 18 BRPM | SYSTOLIC BLOOD PRESSURE: 120 MMHG | HEART RATE: 71 BPM

## 2021-05-09 LAB
ALBUMIN SERPL ELPH-MCNC: 4.1 G/DL — SIGNIFICANT CHANGE UP (ref 3.5–5.2)
ALP SERPL-CCNC: 79 U/L — SIGNIFICANT CHANGE UP (ref 30–115)
ALT FLD-CCNC: 28 U/L — SIGNIFICANT CHANGE UP (ref 0–41)
ANION GAP SERPL CALC-SCNC: 16 MMOL/L — HIGH (ref 7–14)
AST SERPL-CCNC: 35 U/L — SIGNIFICANT CHANGE UP (ref 0–41)
BILIRUB SERPL-MCNC: 0.7 MG/DL — SIGNIFICANT CHANGE UP (ref 0.2–1.2)
BUN SERPL-MCNC: 26 MG/DL — HIGH (ref 10–20)
CALCIUM SERPL-MCNC: 8.9 MG/DL — SIGNIFICANT CHANGE UP (ref 8.5–10.1)
CHLORIDE SERPL-SCNC: 99 MMOL/L — SIGNIFICANT CHANGE UP (ref 98–110)
CO2 SERPL-SCNC: 21 MMOL/L — SIGNIFICANT CHANGE UP (ref 17–32)
CREAT SERPL-MCNC: 1 MG/DL — SIGNIFICANT CHANGE UP (ref 0.7–1.5)
GLUCOSE SERPL-MCNC: 172 MG/DL — HIGH (ref 70–99)
HCT VFR BLD CALC: 47.1 % — SIGNIFICANT CHANGE UP (ref 42–52)
HGB BLD-MCNC: 14.3 G/DL — SIGNIFICANT CHANGE UP (ref 14–18)
MCHC RBC-ENTMCNC: 22.2 PG — LOW (ref 27–31)
MCHC RBC-ENTMCNC: 30.4 G/DL — LOW (ref 32–37)
MCV RBC AUTO: 73 FL — LOW (ref 80–94)
NRBC # BLD: 0 /100 WBCS — SIGNIFICANT CHANGE UP (ref 0–0)
PLATELET # BLD AUTO: 38 K/UL — LOW (ref 130–400)
POTASSIUM SERPL-MCNC: 4 MMOL/L — SIGNIFICANT CHANGE UP (ref 3.5–5)
POTASSIUM SERPL-SCNC: 4 MMOL/L — SIGNIFICANT CHANGE UP (ref 3.5–5)
PROT SERPL-MCNC: 8.8 G/DL — HIGH (ref 6–8)
RBC # BLD: 6.45 M/UL — HIGH (ref 4.7–6.1)
RBC # FLD: 17.3 % — HIGH (ref 11.5–14.5)
SODIUM SERPL-SCNC: 136 MMOL/L — SIGNIFICANT CHANGE UP (ref 135–146)
TSH SERPL-MCNC: 1.29 UIU/ML — SIGNIFICANT CHANGE UP (ref 0.27–4.2)
WBC # BLD: 7.65 K/UL — SIGNIFICANT CHANGE UP (ref 4.8–10.8)
WBC # FLD AUTO: 7.65 K/UL — SIGNIFICANT CHANGE UP (ref 4.8–10.8)

## 2021-05-09 PROCEDURE — 99232 SBSQ HOSP IP/OBS MODERATE 35: CPT

## 2021-05-09 PROCEDURE — 99239 HOSP IP/OBS DSCHRG MGMT >30: CPT

## 2021-05-09 RX ORDER — ENOXAPARIN SODIUM 100 MG/ML
10 INJECTION SUBCUTANEOUS
Qty: 2 | Refills: 0
Start: 2021-05-09

## 2021-05-09 RX ORDER — PANTOPRAZOLE SODIUM 20 MG/1
1 TABLET, DELAYED RELEASE ORAL
Qty: 14 | Refills: 0
Start: 2021-05-09

## 2021-05-09 RX ADMIN — LOSARTAN POTASSIUM 100 MILLIGRAM(S): 100 TABLET, FILM COATED ORAL at 05:19

## 2021-05-09 RX ADMIN — ATORVASTATIN CALCIUM 40 MILLIGRAM(S): 80 TABLET, FILM COATED ORAL at 11:09

## 2021-05-09 RX ADMIN — Medication 130 MILLIGRAM(S): at 05:22

## 2021-05-09 RX ADMIN — Medication 25 MILLIGRAM(S): at 05:20

## 2021-05-09 RX ADMIN — Medication 81 MILLIGRAM(S): at 11:09

## 2021-05-09 RX ADMIN — CHLORHEXIDINE GLUCONATE 1 APPLICATION(S): 213 SOLUTION TOPICAL at 05:22

## 2021-05-09 RX ADMIN — Medication 1 SPRAY(S): at 11:09

## 2021-05-09 RX ADMIN — PANTOPRAZOLE SODIUM 40 MILLIGRAM(S): 20 TABLET, DELAYED RELEASE ORAL at 05:20

## 2021-05-09 RX ADMIN — AMLODIPINE BESYLATE 5 MILLIGRAM(S): 2.5 TABLET ORAL at 05:21

## 2021-05-09 NOTE — PROGRESS NOTE ADULT - SUBJECTIVE AND OBJECTIVE BOX
Patient is a 66y old  Male who presents with a chief complaint of Thrombocytopenia (08 May 2021 15:25)      Subjective: No bleeding episodes   Tolerated IVIG well.         Vital Signs Last 24 Hrs  T(C): 36.8 (08 May 2021 14:12), Max: 36.8 (07 May 2021 18:09)  T(F): 98.2 (08 May 2021 14:12), Max: 98.2 (07 May 2021 18:09)  HR: 83 (08 May 2021 14:12) (70 - 83)  BP: 145/63 (08 May 2021 14:12) (112/64 - 145/63)  BP(mean): --  RR: 18 (08 May 2021 14:12) (18 - 20)  SpO2: 98% (08 May 2021 04:17) (98% - 98%)    PHYSICAL EXAM  General: adult in NAD  HEENT: clear oropharynx  Neck: supple  CV: normal S1/S2 with no murmur rubs or gallops  Lungs: positive air movement b/l ant lungs,clear to auscultation, no wheezes, no rales  Abdomen: soft non-tender non-distended, no hepatosplenomegaly  Ext: no clubbing cyanosis or edema  Skin: no rashes and no petechiae  Neuro: alert and oriented X 4, no focal deficits    MEDICATIONS  (STANDING):  amLODIPine   Tablet 5 milliGRAM(s) Oral daily  aspirin  chewable 81 milliGRAM(s) Oral daily  atorvastatin Oral Tab/Cap - Peds 40 milliGRAM(s) Oral daily  chlorhexidine 4% Liquid 1 Application(s) Topical <User Schedule>  fluticasone propionate (50 MICROgram(s)/actuation) Nasal Spray - Peds 1 Spray(s) Alternating Nostrils daily  hydrochlorothiazide 25 milliGRAM(s) Oral daily  immune   globulin 10% (GAMMAGARD) IVPB 95 Gram(s) IV Intermittent once  losartan 100 milliGRAM(s) Oral daily  pantoprazole    Tablet 40 milliGRAM(s) Oral before breakfast  predniSONE   Tablet 130 milliGRAM(s) Oral daily    MEDICATIONS  (PRN):      LABS:                          13.1   5.57  )-----------( 10       ( 08 May 2021 04:30 )             42.8         Mean Cell Volume : 74.3 fL  Mean Cell Hemoglobin : 22.7 pg  Mean Cell Hemoglobin Concentration : 30.6 g/dL  Auto Neutrophil # : 4.76 K/uL  Auto Lymphocyte # : 0.65 K/uL  Auto Monocyte # : 0.08 K/uL  Auto Eosinophil # : 0.01 K/uL  Auto Basophil # : 0.05 K/uL  Auto Neutrophil % : 85.4 %  Auto Lymphocyte % : 11.7 %  Auto Monocyte % : 1.4 %  Auto Eosinophil % : 0.2 %  Auto Basophil % : 0.9 %      Serial CBC's  05-08 @ 04:30  Hct-42.8 / Hgb-13.1 / Plat-10 / RBC-5.76 / WBC-5.57  Serial CBC's  05-07 @ 20:00  Hct--- / Hgb--- / Plat--- / RBC-6.24 / WBC---  Serial CBC's  05-07 @ 16:38  Hct-46.8 / Hgb-14.5 / Plat-5 / RBC-6.34 / WBC-8.23      05-08    132<L>  |  97<L>  |  23<H>  ----------------------------<  225<H>  4.3   |  22  |  1.0    Ca    8.7      08 May 2021 04:30  Mg     1.7     05-08    TPro  8.3<H>  /  Alb  3.7  /  TBili  0.6  /  DBili  x   /  AST  25  /  ALT  26  /  AlkPhos  79  05-08      PT/INR - ( 08 May 2021 04:30 )   PT: 14.00 sec;   INR: 1.22 ratio         PTT - ( 08 May 2021 04:30 )  PTT:31.1 sec    Reticulocyte Percent: 1.6 % (05-08 @ 04:30)  Reticulocyte Percent: 1.6 % (05-07 @ 20:00)  Iron - Total Binding Capacity.: 316 ug/dL (05-07 @ 20:00)  Ferritin, Serum: 65 ng/mL (05-07 @ 20:00)  Vitamin B12, Serum: 373 pg/mL (05-07 @ 20:00)  Folate, Serum: 4.9 ng/mL (05-07 @ 20:00)                          BLOOD SMEAR INTERPRETATION:       RADIOLOGY & ADDITIONAL STUDIES:    
Patient is a 66y old  Male who presents with a chief complaint of Thrombocytopenia (09 May 2021 10:56)      Subjective: No new events Doing well       Vital Signs Last 24 Hrs  T(C): 35.8 (09 May 2021 05:00), Max: 36.8 (08 May 2021 14:12)  T(F): 96.5 (09 May 2021 05:00), Max: 98.2 (08 May 2021 14:12)  HR: 71 (09 May 2021 05:00) (71 - 83)  BP: 120/74 (09 May 2021 05:00) (120/74 - 145/63)  BP(mean): --  RR: 18 (09 May 2021 05:00) (18 - 18)  SpO2: --    PHYSICAL EXAM  General: adult in NAD  HEENT: clear oropharynx, anicteric sclera, pink conjunctiva  Neck: supple  CV: normal S1/S2 with no murmur rubs or gallops  Lungs: positive air movement b/l ant lungs  Abdomen: soft non-tender non-distended, no hepatosplenomegaly  Ext: no clubbing cyanosis or edema  Skin: no rashes and no petechiae  Neuro: alert and oriented X 4, no focal deficits    MEDICATIONS  (STANDING):  amLODIPine   Tablet 5 milliGRAM(s) Oral daily  aspirin  chewable 81 milliGRAM(s) Oral daily  atorvastatin Oral Tab/Cap - Peds 40 milliGRAM(s) Oral daily  chlorhexidine 4% Liquid 1 Application(s) Topical <User Schedule>  fluticasone propionate (50 MICROgram(s)/actuation) Nasal Spray - Peds 1 Spray(s) Alternating Nostrils daily  hydrochlorothiazide 25 milliGRAM(s) Oral daily  losartan 100 milliGRAM(s) Oral daily  pantoprazole    Tablet 40 milliGRAM(s) Oral before breakfast  predniSONE   Tablet 130 milliGRAM(s) Oral daily    MEDICATIONS  (PRN):      LABS:                          14.3   7.65  )-----------( 38       ( 09 May 2021 07:24 )             47.1         Mean Cell Volume : 73.0 fL  Mean Cell Hemoglobin : 22.2 pg  Mean Cell Hemoglobin Concentration : 30.4 g/dL  Auto Neutrophil # : x  Auto Lymphocyte # : x  Auto Monocyte # : x  Auto Eosinophil # : x  Auto Basophil # : x  Auto Neutrophil % : x  Auto Lymphocyte % : x  Auto Monocyte % : x  Auto Eosinophil % : x  Auto Basophil % : x      Serial CBC's  05-09 @ 07:24  Hct-47.1 / Hgb-14.3 / Plat-38 / RBC-6.45 / WBC-7.65  Serial CBC's  05-08 @ 04:30  Hct-42.8 / Hgb-13.1 / Plat-10 / RBC-5.76 / WBC-5.57  Serial CBC's  05-07 @ 20:00  Hct--- / Hgb--- / Plat--- / RBC-6.24 / WBC---  Serial CBC's  05-07 @ 16:38  Hct-46.8 / Hgb-14.5 / Plat-5 / RBC-6.34 / WBC-8.23      05-09    136  |  99  |  26<H>  ----------------------------<  172<H>  4.0   |  21  |  1.0    Ca    8.9      09 May 2021 07:24  Mg     1.7     05-08    TPro  8.8<H>  /  Alb  4.1  /  TBili  0.7  /  DBili  x   /  AST  35  /  ALT  28  /  AlkPhos  79  05-09      PT/INR - ( 08 May 2021 04:30 )   PT: 14.00 sec;   INR: 1.22 ratio         PTT - ( 08 May 2021 04:30 )  PTT:31.1 sec    Reticulocyte Percent: 1.6 % (05-08 @ 04:30)  Reticulocyte Percent: 1.6 % (05-07 @ 20:00)  Iron - Total Binding Capacity.: 316 ug/dL (05-07 @ 20:00)  Ferritin, Serum: 65 ng/mL (05-07 @ 20:00)  Vitamin B12, Serum: 373 pg/mL (05-07 @ 20:00)  Folate, Serum: 4.9 ng/mL (05-07 @ 20:00)                          BLOOD SMEAR INTERPRETATION:       RADIOLOGY & ADDITIONAL STUDIES:    
S:  sugars high d/t steroids  eating/drinking well.  no bleeding      All other pertinent ROS negative.      Vital Signs Last 24 Hrs  T(C): 36.8 (08 May 2021 14:12), Max: 36.8 (07 May 2021 15:39)  T(F): 98.2 (08 May 2021 14:12), Max: 98.3 (07 May 2021 15:39)  HR: 83 (08 May 2021 14:12) (70 - 83)  BP: 145/63 (08 May 2021 14:12) (112/64 - 145/63)  BP(mean): --  RR: 18 (08 May 2021 14:12) (18 - 20)  SpO2: 98% (08 May 2021 04:17) (98% - 100%)  PHYSICAL EXAM:    Constitutional: NAD, awake and alert, well-developed  HEENT: PERR, EOMI, Normal Hearing, MMM  Neck: Soft and supple, No LAD, No JVD  Respiratory: Breath sounds are clear bilaterally, No wheezing, rales or rhonchi  Cardiovascular: S1 and S2, regular rate and rhythm, no Murmurs, gallops or rubs  Gastrointestinal: Bowel Sounds present, soft, nontender, nondistended, no guarding, no rebound  Extremities: No peripheral edema      MEDICATIONS:  MEDICATIONS  (STANDING):  amLODIPine   Tablet 5 milliGRAM(s) Oral daily  aspirin  chewable 81 milliGRAM(s) Oral daily  atorvastatin Oral Tab/Cap - Peds 40 milliGRAM(s) Oral daily  chlorhexidine 4% Liquid 1 Application(s) Topical <User Schedule>  fluticasone propionate (50 MICROgram(s)/actuation) Nasal Spray - Peds 1 Spray(s) Alternating Nostrils daily  hydrochlorothiazide 25 milliGRAM(s) Oral daily  immune   globulin 10% (GAMMAGARD) IVPB 95 Gram(s) IV Intermittent once  losartan 100 milliGRAM(s) Oral daily  pantoprazole    Tablet 40 milliGRAM(s) Oral before breakfast  predniSONE   Tablet 130 milliGRAM(s) Oral daily      LABS: All Labs Reviewed:                        13.1   5.57  )-----------( 10       ( 08 May 2021 04:30 )             42.8     05-08    132<L>  |  97<L>  |  23<H>  ----------------------------<  225<H>  4.3   |  22  |  1.0    Ca    8.7      08 May 2021 04:30  Mg     1.7     05-08    TPro  8.3<H>  /  Alb  3.7  /  TBili  0.6  /  DBili  x   /  AST  25  /  ALT  26  /  AlkPhos  79  05-08    PT/INR - ( 08 May 2021 04:30 )   PT: 14.00 sec;   INR: 1.22 ratio         PTT - ( 08 May 2021 04:30 )  PTT:31.1 sec      Blood Culture:     Radiology: reviewed

## 2021-05-09 NOTE — DISCHARGE NOTE PROVIDER - NSDCCPCAREPLAN_GEN_ALL_CORE_FT
PRINCIPAL DISCHARGE DIAGNOSIS  Diagnosis: Thrombocytopenia  Assessment and Plan of Treatment: Thrombocytopenia with normal WBC and Hb- likely 2/2 to ITP.   Peripheral smear reviewed- shows normal RBC- normocytic, 1-2 schistocytes noted (non specific), some target cells.   WBC- normal looking. No blasts.  Platelet- decreased in number. No clumps noted. Some large platelets seen.   No splenomegaly on US spleen.   No evidence of hemolysis- normal LDH, hapto and ret count.   HIV NR  No need for platelet transfusion unless patient is actively bleeding .   heparin PF4 Ab negative.  Hep B, Hep C,  EBV Negative. Rheumatoid factor negative  Pending results for HORACIO, CMV, Parvovirus   f.u peripheral flow cytometry   c/w prednisone  1mg/kg- 130 mg PO with protonix 40 - Rx sent  YOU WILL GET CALL ON MONDAY WITH HEMATOLOGY APPOINTMENT DETAILS FOR TUESDAY. DISCUSS FURTHER MEDICATION PLAN AT THAT APPOINTMENT.  He received 1 dose  IVIG 1g/kg on 5.7.21 & 2nd dose 5.8.21  WHILE ON PREDNISONE, CONTINUE TO USE LANTUS 10 Units AT BEDTIME (until hematlogy advises otherwise). Check bedtime sugar daily. If less than 140, skip the lantus dose that evening.   Also check morning fasting sugars daily. If ever less than 100, CALL MD.   May sometimes check sugars before lunch and before dinner.   LOG THESE SUGARS ON A PAPER AND SHOW TO PCP ON NEXT APPOINTMENT.

## 2021-05-09 NOTE — DISCHARGE NOTE PROVIDER - PROVIDER TOKENS
FREE:[LAST:[hematology],PHONE:[(   )    -],FAX:[(   )    -],FOLLOWUP:[1-3 days]],FREE:[LAST:[PCP],PHONE:[(   )    -],FAX:[(   )    -],FOLLOWUP:[1 week]]

## 2021-05-09 NOTE — PROGRESS NOTE ADULT - ASSESSMENT
65 y/o male with PMH of DM, HTN, DLD, MAICOL referred by PMD for low platelet count of 8 on blood work that was done yesterday. Repeat CBC in ER today shows platelet count of 5.     He got Moderna COVID vaccine in Jan and Feb 2021.   On review of HIE in June 2020 his plt count was low 100s and in Jan 2021 plt count was 53. WBC and Hb has been normal.       1. Thrombocytopenia with normal WBC and Hb- likely 2/2 to ITP.   Peripheral smear on admission - shows normal RBC- normocytic, 1-2 schistocytes noted (non specific), some target cells.   WBC- normal looking. No blasts.  Platelet- decreased in number. No clumps noted. Some large platelets seen.       No splenomegaly on US spleen.   No evidence of hemolysis- normal LDH, hapto and ret count.   HIV NR, HepB and C negative   No need for platelet transfusion unless patient is actively bleeding .       f/u  HORACIO, RF  f/u EBV, CMV, Parvovirus   f.u peripheral flow cytometry     c/w prednisone  1mg/kg- 130 mg PO with protonix 40 - Day 3 today   He received 2 doses  IVIG 1g/kg   His plt counts have improved to 38K. So ok to DC him home.   Please DC him on prednisone 130 mg and Protonix 40 mg.     He will be scheduled for f/u appt with Dr Rondon on Tuesday 5/11/21 with CBC check.   He needs tighter blood sugar control as he will be on steroids for at least a few weeks. He was asked to f/u with PMD closely as well for blood sugar management as well.       DVT ppx- SCD as pt has low plt  Discussed plan with primary team  65 y/o male with PMH of DM, HTN, DLD, MAICOL referred by PMD for low platelet count of 8 on blood work that was done yesterday. Repeat CBC in ER today shows platelet count of 5.     He got Moderna COVID vaccine in Jan and Feb 2021.   On review of HIE in June 2020 his plt count was low 100s and in Jan 2021 plt count was 53. WBC and Hb has been normal.       1. Thrombocytopenia with normal WBC and Hb- likely 2/2 to ITP.   Peripheral smear on admission - shows normal RBC- normocytic, 1-2 schistocytes noted (non specific), some target cells.   WBC- normal looking. No blasts.  Platelet- decreased in number. No clumps noted. Some large platelets seen.       No splenomegaly on US spleen.   No evidence of hemolysis- normal LDH, hapto and ret count.   HIV NR, HepB and C negative   No need for platelet transfusion unless patient is actively bleeding .       f/u  HORACIO, RF  f/u EBV, CMV, Parvovirus   f.u peripheral flow cytometry     5.9.21: d3 of  prednisone  1mg/kg- 130 mg PO   maintain on  protonix 40  mg qd to prevent stress PUD  He received 2 doses  IVIG 1g/kg (5/7/21 AND 5/8/21  His plt counts have improved to 38K. So ok to DC him home with a very close f/u : appt w/ myself on 5.11.21  Please DC him on prednisone 130 mg and Protonix 40 mg.     He will be scheduled for f/u appt with Dr Rondon on Tuesday 5/11/21 with CBC check.   NOTE: ***He needs a VERY TIGHT blood sugar control as he will be on steroids for a few weeks. He was asked to f/u with PMD closely as well for blood sugar management as well.       DVT ppx- SCD as pt has low plt  Discussed plan with primary team

## 2021-05-09 NOTE — DISCHARGE NOTE PROVIDER - NSDCMRMEDTOKEN_GEN_ALL_CORE_FT
amLODIPine 5 mg oral tablet: 1 tab(s) orally once a day  aspirin 81 mg oral tablet, chewable: 1 tab(s) orally once a day  atorvastatin 40 mg oral tablet: 1 tab(s) orally once a day  Flonase 50 mcg/inh nasal spray: 1 spray(s) nasal once a day  hydroCHLOROthiazide 25 mg oral tablet: 1 tab(s) orally once a day  losartan 100 mg oral tablet: 1 tab(s) orally once a day  metFORMIN 1000 mg oral tablet: 1 tab(s) orally 2 times a day  pantoprazole 40 mg oral delayed release tablet: 1 tab(s) orally once a day (before a meal)  predniSONE 10 mg oral tablet: 13 tab(s) orally once a day    amLODIPine 5 mg oral tablet: 1 tab(s) orally once a day  aspirin 81 mg oral tablet, chewable: 1 tab(s) orally once a day  atorvastatin 40 mg oral tablet: 1 tab(s) orally once a day  Flonase 50 mcg/inh nasal spray: 1 spray(s) nasal once a day  hydroCHLOROthiazide 25 mg oral tablet: 1 tab(s) orally once a day  Lantus Solostar Pen 100 units/mL subcutaneous solution: 10 unit(s) subcutaneous once a day (at bedtime). SKIP DOSE IF BEDTIME SUGAR LESS THAN 140.   losartan 100 mg oral tablet: 1 tab(s) orally once a day  metFORMIN 1000 mg oral tablet: 1 tab(s) orally 2 times a day  pantoprazole 40 mg oral delayed release tablet: 1 tab(s) orally once a day (before a meal)  predniSONE 10 mg oral tablet: 13 tab(s) orally once a day

## 2021-05-09 NOTE — DISCHARGE NOTE PROVIDER - HOSPITAL COURSE
67 y/o male with PMH of DM, HTN, DLD, MAICOL referred by PMD for low platelet count of 8 on blood work that was done yesterday. Repeat CBC in ER today shows platelet count of 5.     He got Moderna COVID vaccine in Jan and Feb 2021.   On review of HIE in June 2020 his plt count was low 100s and in Jan 2021 plt count was 53. WBC and Hb has been normal.       1. Thrombocytopenia with normal WBC and Hb- likely 2/2 to ITP.   Peripheral smear reviewed- shows normal RBC- normocytic, 1-2 schistocytes noted (non specific), some target cells.   WBC- normal looking. No blasts.  Platelet- decreased in number. No clumps noted. Some large platelets seen.       No splenomegaly on US spleen.   No evidence of hemolysis- normal LDH, hapto and ret count.   HIV NR  No need for platelet transfusion unless patient is actively bleeding .      heparin PF4 Ab negative.  Hep B, Hep C,  EBV Negative. Rheumatoid factor negative  Pending results for HORACIO, CMV, Parvovirus   f.u peripheral flow cytometry       c/w prednisone  1mg/kg- 130 mg PO with protonix 40 - Day 2 today   He received 1 dose  IVIG 1g/kg on 5.7.21 & 2nd dose 5.8.21  Please monitor blood sugars as pt is on steroids. He will likely need insulin on DC for better control as he is expected to be on prednisone for a few weeks with slow taper based on his response     5/9: d/c to home. PLATELETS TODAY 38. C/E PREDNISONE UNTIL HEME APPOINTMENT NEXT WEEK.

## 2021-05-09 NOTE — DISCHARGE NOTE PROVIDER - NSDCFUSCHEDAPPT_GEN_ALL_CORE_FT
ELISHA BARGER ; 05/11/2021 ; [facility]  ELISHA BARGER ; 05/11/2021 ; [facility] [address]

## 2021-05-09 NOTE — PROGRESS NOTE ADULT - ATTENDING COMMENTS
SEEn/examined w/ hemonc fellow; note reviewed; case discussed    likely ITP, etiology is not clear  w/u pending  might need bone marrow biopsy to exclude MDS or other malignant etiology (demographics of 67 yo is not usual for development of new onset ITP  (this was discussed w/ pt and he is inclined to wait with bone marrow for now:   meantime, continue high dose steroids (completed 2 days of prednisone 1 mg/kg) ; after 2 doses of IVIG 1G/KG, his plats improved to 38K,which might be quite a temporary effect; therefore, will need a very close f/u in 2 days in my office; PPI daily    nOTE that his DM control is already worsened since predinisone has been started: he will need a very thorough f/u w/ PMD on a regular basis

## 2021-05-09 NOTE — DISCHARGE NOTE PROVIDER - CARE PROVIDER_API CALL
hematology,   Phone: (   )    -  Fax: (   )    -  Follow Up Time: 1-3 days    PCP,   Phone: (   )    -  Fax: (   )    -  Follow Up Time: 1 week

## 2021-05-09 NOTE — DISCHARGE NOTE NURSING/CASE MANAGEMENT/SOCIAL WORK - PATIENT PORTAL LINK FT
You can access the FollowMyHealth Patient Portal offered by [facility] by registering at the following website: [email or web address]. By joining [facility]’s FollowMyHealth portal, you will also be able to view your health information using other applications (apps) compatible with our system.

## 2021-05-10 PROBLEM — I10 ESSENTIAL (PRIMARY) HYPERTENSION: Chronic | Status: ACTIVE | Noted: 2021-05-07

## 2021-05-10 PROBLEM — E78.00 PURE HYPERCHOLESTEROLEMIA, UNSPECIFIED: Chronic | Status: ACTIVE | Noted: 2021-05-07

## 2021-05-10 LAB
CMV IGM FLD-ACNC: <8 AU/ML — SIGNIFICANT CHANGE UP
CMV IGM SERPL QL: NEGATIVE — SIGNIFICANT CHANGE UP
EBV EA AB TITR SER IF: POSITIVE
EBV NA IGG SER IA-ACNC: >600 U/ML — HIGH
EBV VCA IGG AVIDITY SER QL IA: POSITIVE
EBV VCA IGM SER IA-ACNC: 16.2 U/ML — SIGNIFICANT CHANGE UP
EBV VCA IGM SER IA-ACNC: 293 U/ML — HIGH
EBV VCA IGM TITR FLD: NEGATIVE — SIGNIFICANT CHANGE UP

## 2021-05-11 ENCOUNTER — LABORATORY RESULT (OUTPATIENT)
Age: 66
End: 2021-05-11

## 2021-05-11 ENCOUNTER — APPOINTMENT (OUTPATIENT)
Dept: HEMATOLOGY ONCOLOGY | Facility: CLINIC | Age: 66
End: 2021-05-11
Payer: MEDICARE

## 2021-05-11 VITALS
WEIGHT: 292 LBS | BODY MASS INDEX: 46.93 KG/M2 | HEART RATE: 79 BPM | RESPIRATION RATE: 18 BRPM | DIASTOLIC BLOOD PRESSURE: 64 MMHG | TEMPERATURE: 97.9 F | HEIGHT: 66 IN | SYSTOLIC BLOOD PRESSURE: 122 MMHG

## 2021-05-11 LAB
HCT VFR BLD CALC: 42.3 %
HGB BLD-MCNC: 13.2 G/DL
MCHC RBC-ENTMCNC: 22.9 PG
MCHC RBC-ENTMCNC: 31.2 G/DL
MCV RBC AUTO: 73.4 FL
PLATELET # BLD AUTO: 116 K/UL
PMV BLD: NORMAL
RBC # BLD: 5.76 M/UL
RBC # FLD: 16.4 %
TM INTERPRETATION: SIGNIFICANT CHANGE UP
WBC # FLD AUTO: 9.05 K/UL

## 2021-05-11 PROCEDURE — 99215 OFFICE O/P EST HI 40 MIN: CPT

## 2021-05-11 RX ORDER — PEN NEEDLE, DIABETIC 30 GX3/16"
30G X 8 MM NEEDLE, DISPOSABLE MISCELLANEOUS
Qty: 30 | Refills: 3 | Status: ACTIVE | COMMUNITY
Start: 2021-05-11 | End: 1900-01-01

## 2021-05-11 NOTE — CONSULT LETTER
[Dear  ___] : Dear  [unfilled], [Consult Letter:] : I had the pleasure of evaluating your patient, [unfilled]. [Please see my note below.] : Please see my note below. [Sincerely,] : Sincerely, [FreeTextEntry3] : Elvis Rondon DO\par Attending Physician,\par Hematology/ Medical Oncology\par [phone] office\par \par

## 2021-05-11 NOTE — ASSESSMENT
[FreeTextEntry1] : 65 yo man with isolated thrombocytopenia of 5K of unclear etiology. 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 Viral w/u and rheum w/u completion is pending. He is asymptomatic. Treated with 2 doses of IVIG 1G/KG and has started prednisone 1mg/kg since 5/7/21 with good response.\par \par  He had platelets of 106 in June 2020, decreased to 50s in January. He did receive the Moderna vaccine in January and February. \par \par \par Immune thrombocytopenia:\par - Hep B/C/HIV, EBV IgM, CMV IgM negative\par - Smear was did not show clumps, did have large platelets, no schistocytes, No dyspplastic or immature WBC.\par - US spleen wnl\par - Check HORACIO and parvo \par - We will repeat CBC on Friday and if platelets are continuing to rise will begin to taper. \par - it is not common if ITP developed at his age; we will defer bone marrow biopsy at this time\par strict f/u w/ PMD/endocrinology to control his DM management\par f/u in 3 days

## 2021-05-11 NOTE — HISTORY OF PRESENT ILLNESS
[de-identified] : Mr. Dalal is a 66 year old male with PMH of DM, HTN, DLD, MAICOL who was recently admitted for suspected ITP. Patient was noted to have asymptomatic thrombocytopenia as an outpatient and referred to the ED. In the ED his platelet count was 5K with normal WBC and Hg. His smear was reviewed and showed 1-2 schistocytes, no clumping, large platelets, decrease number of platelets, normal WBC morphology/no blasts, some target cells. He was started on prednisone 1 mg/kg of prednisone, and he received two doses of IVIG. His platelets improved to 38 and he was discharged on prednisone. \par Of note, his platelets were 106 in June 2020, ad then 53 in January 2021. Also in January and then in February he received the Moderna vaccine. He does have high RBC count (6.45) and low MCV (73).\par Workup in the hospital showed normal US spleen, no evidence of hemolysis, normal LDH, hapto and ret count, HIV, HepB, HepC EBV IgM, CMV IgM NR , HIT negative , RF negative, peripheral flow negative\par \par Patient informed us he was not discharged with needles for his insulin. His fingersticks have in the low 200s. \par His platelets today are 116. No bleeding, bruising or petechiae.

## 2021-05-12 LAB
ALBUMIN SERPL ELPH-MCNC: 4.2 G/DL
ALP BLD-CCNC: 79 U/L
ALT SERPL-CCNC: 37 U/L
ANION GAP SERPL CALC-SCNC: 13 MMOL/L
AST SERPL-CCNC: 44 U/L
BILIRUB DIRECT SERPL-MCNC: 0.2 MG/DL
BILIRUB INDIRECT SERPL-MCNC: 0.6 MG/DL
BILIRUB SERPL-MCNC: 0.8 MG/DL
BUN SERPL-MCNC: 29 MG/DL
CALCIUM SERPL-MCNC: 9.5 MG/DL
CHLORIDE SERPL-SCNC: 99 MMOL/L
CO2 SERPL-SCNC: 25 MMOL/L
CREAT SERPL-MCNC: 1.1 MG/DL
GLUCOSE SERPL-MCNC: 154 MG/DL
POTASSIUM SERPL-SCNC: 4.6 MMOL/L
PROT SERPL-MCNC: 8.9 G/DL
SODIUM SERPL-SCNC: 137 MMOL/L

## 2021-05-12 RX ORDER — PEN NEEDLE, DIABETIC 29 G X1/2"
31G X 5 MM NEEDLE, DISPOSABLE MISCELLANEOUS
Qty: 100 | Refills: 0 | Status: ACTIVE | COMMUNITY
Start: 2021-05-10 | End: 1900-01-01

## 2021-05-13 LAB
ANA PAT FLD IF-IMP: ABNORMAL
ANA TITR SER: ABNORMAL
B19V IGG SER-ACNC: 5.36 INDEX — HIGH (ref 0–0.9)
B19V IGG+IGM SER-IMP: POSITIVE
B19V IGG+IGM SER-IMP: SIGNIFICANT CHANGE UP
B19V IGM FLD-ACNC: 0.29 INDEX — SIGNIFICANT CHANGE UP (ref 0–0.9)
B19V IGM SER-ACNC: NEGATIVE — SIGNIFICANT CHANGE UP

## 2021-05-14 ENCOUNTER — LABORATORY RESULT (OUTPATIENT)
Age: 66
End: 2021-05-14

## 2021-05-14 ENCOUNTER — APPOINTMENT (OUTPATIENT)
Dept: HEMATOLOGY ONCOLOGY | Facility: CLINIC | Age: 66
End: 2021-05-14
Payer: MEDICARE

## 2021-05-14 VITALS
HEIGHT: 66 IN | TEMPERATURE: 98.2 F | HEART RATE: 89 BPM | SYSTOLIC BLOOD PRESSURE: 128 MMHG | WEIGHT: 290 LBS | BODY MASS INDEX: 46.61 KG/M2 | DIASTOLIC BLOOD PRESSURE: 63 MMHG | RESPIRATION RATE: 16 BRPM

## 2021-05-14 DIAGNOSIS — Z00.00 ENCOUNTER FOR GENERAL ADULT MEDICAL EXAMINATION W/OUT ABNORMAL FINDINGS: ICD-10-CM

## 2021-05-14 LAB
ANA PAT FLD IF-IMP: ABNORMAL
ANA SER IF-ACNC: ABNORMAL
B19V IGG SER QL IA: 9.24 INDEX
B19V IGG+IGM SER-IMP: NORMAL
B19V IGG+IGM SER-IMP: POSITIVE
B19V IGM FLD-ACNC: 0.25 INDEX
B19V IGM SER-ACNC: NEGATIVE

## 2021-05-14 PROCEDURE — 99213 OFFICE O/P EST LOW 20 MIN: CPT

## 2021-05-14 NOTE — ASSESSMENT
[FreeTextEntry1] : 65 yo man with isolated thrombocytopenia of 5K of unclear etiology. 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 Viral w/u and rheum w/u completion is pending. He is asymptomatic. Treated with 2 doses of IVIG 1G/KG and has started prednisone 1mg/kg since 5/7/21 with good response.\par \par  He had platelets of 106 in June 2020, decreased to 50s in January. He did receive the Moderna vaccine in January and February. \par \par \par Immune thrombocytopenia:\par - Hep B/C/HIV, EBV IgM, CMV IgM negative\par - Smear was did not show clumps, did have large platelets, no schistocytes, No dyspplastic or immature WBC.\par - US spleen wnl\par - Check HORACIO and parvo \par - We will repeat CBC on Friday and if platelets are continuing to rise will begin to taper. \par - it is not common if ITP developed at his age; we will defer bone marrow biopsy at this time\par strict f/u w/ PMD/endocrinology to control his DM management\par \par \par 5/14/21: START TAPERING: decrase prednisone from 130mg to 100mg beginning 5.15.21 and f/u 5/18/21\par F/u endocrinology

## 2021-05-14 NOTE — HISTORY OF PRESENT ILLNESS
[de-identified] : Mr. Dalal is a 66 year old male with PMH of DM, HTN, DLD, MAICOL who was recently admitted for suspected ITP. Patient was noted to have asymptomatic thrombocytopenia as an outpatient and referred to the ED. In the ED his platelet count was 5K with normal WBC and Hg. His smear was reviewed and showed 1-2 schistocytes, no clumping, large platelets, decrease number of platelets, normal WBC morphology/no blasts, some target cells. He was started on prednisone 1 mg/kg of prednisone, and he received two doses of IVIG. His platelets improved to 38 and he was discharged on prednisone. \par Of note, his platelets were 106 in June 2020, ad then 53 in January 2021. Also in January and then in February he received the Moderna vaccine. He does have high RBC count (6.45) and low MCV (73).\par Workup in the hospital showed normal US spleen, no evidence of hemolysis, normal LDH, hapto and ret count, HIV, HepB, HepC EBV IgM, CMV IgM NR , HIT negative , RF negative, peripheral flow negative\par \par Patient informed us he was not discharged with needles for his insulin. His fingersticks have in the low 200s. \par His platelets today are 116. No bleeding, bruising or petechiae. \par \par 5/14/2021- pt returns for follow up today.  feeling well.  some mild bruising where labs drawn otherwise no other bruising.  wbc 10.49 hgb 13.5 hct 23.0 platelets now 133. \par pt made f/up appt with new fer Doss for this monday 5/17/2021. P t checking FS at home sugar 130 today.

## 2021-05-14 NOTE — CONSULT LETTER
[Dear  ___] : Dear  [unfilled], [Consult Letter:] : I had the pleasure of evaluating your patient, [unfilled]. [Please see my note below.] : Please see my note below. [Sincerely,] : Sincerely, [FreeTextEntry3] : Elvis Rondon DO\par Attending Physician,\par Hematology/ Medical Oncology\par [phone] office\par \par

## 2021-05-17 DIAGNOSIS — E11.9 TYPE 2 DIABETES MELLITUS WITHOUT COMPLICATIONS: ICD-10-CM

## 2021-05-17 DIAGNOSIS — Z92.22 PERSONAL HISTORY OF MONOCLONAL DRUG THERAPY: ICD-10-CM

## 2021-05-17 DIAGNOSIS — I10 ESSENTIAL (PRIMARY) HYPERTENSION: ICD-10-CM

## 2021-05-17 DIAGNOSIS — I87.8 OTHER SPECIFIED DISORDERS OF VEINS: ICD-10-CM

## 2021-05-17 DIAGNOSIS — E78.5 HYPERLIPIDEMIA, UNSPECIFIED: ICD-10-CM

## 2021-05-17 DIAGNOSIS — G47.33 OBSTRUCTIVE SLEEP APNEA (ADULT) (PEDIATRIC): ICD-10-CM

## 2021-05-17 DIAGNOSIS — E66.9 OBESITY, UNSPECIFIED: ICD-10-CM

## 2021-05-17 DIAGNOSIS — D69.3 IMMUNE THROMBOCYTOPENIC PURPURA: ICD-10-CM

## 2021-05-17 DIAGNOSIS — J44.9 CHRONIC OBSTRUCTIVE PULMONARY DISEASE, UNSPECIFIED: ICD-10-CM

## 2021-05-17 DIAGNOSIS — D69.6 THROMBOCYTOPENIA, UNSPECIFIED: ICD-10-CM

## 2021-05-17 DIAGNOSIS — Z87.891 PERSONAL HISTORY OF NICOTINE DEPENDENCE: ICD-10-CM

## 2021-05-17 DIAGNOSIS — Z79.84 LONG TERM (CURRENT) USE OF ORAL HYPOGLYCEMIC DRUGS: ICD-10-CM

## 2021-05-17 DIAGNOSIS — D72.810 LYMPHOCYTOPENIA: ICD-10-CM

## 2021-05-18 ENCOUNTER — APPOINTMENT (OUTPATIENT)
Dept: HEMATOLOGY ONCOLOGY | Facility: CLINIC | Age: 66
End: 2021-05-18
Payer: MEDICARE

## 2021-05-18 ENCOUNTER — LABORATORY RESULT (OUTPATIENT)
Age: 66
End: 2021-05-18

## 2021-05-18 VITALS
TEMPERATURE: 98.1 F | BODY MASS INDEX: 46.61 KG/M2 | HEART RATE: 72 BPM | RESPIRATION RATE: 16 BRPM | DIASTOLIC BLOOD PRESSURE: 76 MMHG | SYSTOLIC BLOOD PRESSURE: 127 MMHG | WEIGHT: 290 LBS | HEIGHT: 66 IN

## 2021-05-18 VITALS — BODY MASS INDEX: 46.32 KG/M2 | WEIGHT: 287 LBS

## 2021-05-18 PROCEDURE — 99215 OFFICE O/P EST HI 40 MIN: CPT

## 2021-05-18 NOTE — ASSESSMENT
[FreeTextEntry1] : 65 yo man with isolated thrombocytopenia of 5K of unclear etiology. 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 Viral w/u and rheum w/u completion is pending. He is asymptomatic. Treated with 2 doses of IVIG 1G/KG and has started prednisone 1mg/kg since 5/7/21 with good response.\par \par  He had platelets of 106 in June 2020, decreased to 50s in January. He did receive the Moderna vaccine in January and February. \par \par Immune thrombocytopenia:\par - Hep B/C/HIV, EBV IgM, CMV IgM negative\par - Smear in the hopsital did not show clumps, did have large platelets, no schistocytes, No dyspplastic or immature WBC.\par - US spleen wnl\par - HORACIO 1:160 \par - it is not common if ITP developed at his age; we will defer bone marrow biopsy at this time\par strict f/u w/ PMD/endocrinology to control his DM management\par \par \par 5/14/21: START TAPERING: decrase prednisone from 130mg to 100mg beginning 5.15.21 and f/u 5/18/21\par F/u endocrinology\par 518/21: Platlelets decreased to 28. We reviewed the smear and there was no clumping, 2-3 platelets seen per HPF. We told him to take an aditional 30mg today and will increase back to 130mg QD. He will return to the clinic early Friday for repeat CBC with smear. If count is lower we will give IVIG on Friday. Will likely refer to rheum for elevated HORACIO. \par \par Seen/examiened w/ Kale Terry DO\par \par 65 yo man w/ decreasing platelets count over the last year, s/p MODERNA earlier this year, admitted w/ isolated thrombocytopenia of 5K, \par \par started on IV IG 1G/kg daily x2 (5/7/21-5/8/21) and on prednisone 1mg/kg beginning 5/7/21. \par \par Platelets counts improved to 116K (5/11/21) and \par 133K (5/14/21), when the dose of prednisone was decreased from 130mg to 100mg daily: \par 5/18/21: 28K\par \par HORACIO +\par PLAN:\par - concerned if this is refractory disease vs effect of IVIG rather than prednisone; will increase the dose to 130mg daily\par - if platelets count imprvoes, will continue steroids and adjust the dose as needed\par - if platelets count decreases further, will administer IVIG 1G/KG on 5/21/21\par - if platelets count does not improve, will consider TPO or rituxan\par - bone marrow bx is under consideration given the demographics\par - rheum consult is under consideration given his HORACIO+\par - emotional support given; case discussed within oncology group; \par - will keep a close f/u: next visit in 3 days

## 2021-05-18 NOTE — PHYSICAL EXAM
[Restricted in physically strenuous activity but ambulatory and able to carry out work of a light or sedentary nature] : Status 1- Restricted in physically strenuous activity but ambulatory and able to carry out work of a light or sedentary nature, e.g., light house work, office work [Obese] : obese [Normal] : affect appropriate [de-identified] : no bleed  [de-identified] : no mucocutaneous bleed

## 2021-05-18 NOTE — REVIEW OF SYSTEMS
[Negative] : Allergic/Immunologic [Fever] : no fever [Chills] : no chills [Night Sweats] : no night sweats [Fatigue] : no fatigue [Recent Change In Weight] : ~T no recent weight change [Vision Problems] : no vision problems [Nosebleeds] : no nosebleeds [Hoarseness] : no hoarseness [Palpitations] : no palpitations [Leg Claudication] : no intermittent leg claudication [Lower Ext Edema] : no lower extremity edema [Shortness Of Breath] : no shortness of breath [Wheezing] : no wheezing [Cough] : no cough [SOB on Exertion] : no shortness of breath during exertion [Vomiting] : no vomiting [Diarrhea] : no diarrhea [Joint Pain] : no joint pain [Joint Stiffness] : no joint stiffness [Muscle Pain] : no muscle pain

## 2021-05-18 NOTE — CONSULT LETTER
[Dear  ___] : Dear  [unfilled], [Consult Letter:] : I had the pleasure of evaluating your patient, [unfilled]. [Please see my note below.] : Please see my note below. [Sincerely,] : Sincerely, [FreeTextEntry3] : Elvis Rondon DO\par Attending Physician,\par Hematology/ Medical Oncology\par [phone] office\par \par

## 2021-05-18 NOTE — HISTORY OF PRESENT ILLNESS
[de-identified] : Mr. Dalal is a 66 year old male with PMH of DM, HTN, DLD, MAICOL who was recently admitted for suspected ITP. Patient was noted to have asymptomatic thrombocytopenia as an outpatient and referred to the ED. In the ED his platelet count was 5K with normal WBC and Hg. His smear was reviewed and showed 1-2 schistocytes, no clumping, large platelets, decrease number of platelets, normal WBC morphology/no blasts, some target cells. He was started on prednisone 1 mg/kg of prednisone, and he received two doses of IVIG. His platelets improved to 38 and he was discharged on prednisone. \par Of note, his platelets were 106 in June 2020, ad then 53 in January 2021. Also in January and then in February he received the Moderna vaccine. He does have high RBC count (6.45) and low MCV (73).\par Workup in the hospital showed normal US spleen, no evidence of hemolysis, normal LDH, hapto and ret count, HIV, HepB, HepC EBV IgM, CMV IgM NR , HIT negative , RF negative, peripheral flow negative\par \par Patient informed us he was not discharged with needles for his insulin. His fingersticks have in the low 200s. \par His platelets today are 116. No bleeding, bruising or petechiae. \par \par 5/14/2021- pt returns for follow up today.  feeling well.  some mild bruising where labs drawn otherwise no other bruising.  wbc 10.49 hgb 13.5 hct 23.0 platelets now 133. \par pt made f/up appt with new fer Doss for this monday 5/17/2021. P t checking FS at home sugar 130 today.   [de-identified] : 5/18/21: Mr. Dalal returns for followup. Since Saturday he has been taking 100mg prednsone daily, he was on 130mg prior to this. No evidence of bleeding, petechiae. His platelets dropped to 28K. We reviewed the smear and there was no clumping, 2-3 platelets per HPF. We told him to take an additional 30mg today.

## 2021-05-19 LAB
ANION GAP SERPL CALC-SCNC: 14 MMOL/L
BUN SERPL-MCNC: 26 MG/DL
CALCIUM SERPL-MCNC: 9.3 MG/DL
CHLORIDE SERPL-SCNC: 97 MMOL/L
CO2 SERPL-SCNC: 26 MMOL/L
CREAT SERPL-MCNC: 1 MG/DL
GLUCOSE SERPL-MCNC: 201 MG/DL
HCT VFR BLD CALC: 44.6 %
HGB BLD-MCNC: 13.8 G/DL
MCHC RBC-ENTMCNC: 23 PG
MCHC RBC-ENTMCNC: 30.9 G/DL
MCV RBC AUTO: 74.3 FL
PLATELET # BLD AUTO: 28 K/UL
PMV BLD: NORMAL
POTASSIUM SERPL-SCNC: 4.3 MMOL/L
RBC # BLD: 6 M/UL
RBC # FLD: 18.1 %
SODIUM SERPL-SCNC: 137 MMOL/L
WBC # FLD AUTO: 12.32 K/UL

## 2021-05-21 ENCOUNTER — APPOINTMENT (OUTPATIENT)
Dept: HEMATOLOGY ONCOLOGY | Facility: CLINIC | Age: 66
End: 2021-05-21

## 2021-05-21 ENCOUNTER — APPOINTMENT (OUTPATIENT)
Dept: INFUSION THERAPY | Facility: CLINIC | Age: 66
End: 2021-05-21
Payer: MEDICARE

## 2021-05-21 ENCOUNTER — LABORATORY RESULT (OUTPATIENT)
Age: 66
End: 2021-05-21

## 2021-05-21 ENCOUNTER — APPOINTMENT (OUTPATIENT)
Dept: HEMATOLOGY ONCOLOGY | Facility: CLINIC | Age: 66
End: 2021-05-21
Payer: MEDICARE

## 2021-05-21 ENCOUNTER — INPATIENT (INPATIENT)
Facility: HOSPITAL | Age: 66
LOS: 2 days | Discharge: HOME | End: 2021-05-24
Attending: INTERNAL MEDICINE | Admitting: INTERNAL MEDICINE
Payer: MEDICARE

## 2021-05-21 VITALS
HEART RATE: 75 BPM | RESPIRATION RATE: 18 BRPM | HEIGHT: 68 IN | TEMPERATURE: 98 F | WEIGHT: 287.04 LBS | OXYGEN SATURATION: 97 %

## 2021-05-21 VITALS
BODY MASS INDEX: 46.12 KG/M2 | DIASTOLIC BLOOD PRESSURE: 62 MMHG | TEMPERATURE: 98.2 F | HEIGHT: 66 IN | RESPIRATION RATE: 16 BRPM | SYSTOLIC BLOOD PRESSURE: 113 MMHG | WEIGHT: 287 LBS | HEART RATE: 69 BPM

## 2021-05-21 LAB
GLUCOSE BLDC GLUCOMTR-MCNC: 204 MG/DL — HIGH (ref 70–99)
GLUCOSE BLDC GLUCOMTR-MCNC: 318 MG/DL — HIGH (ref 70–99)
HCT VFR BLD CALC: 44.5 %
HGB BLD-MCNC: 13.9 G/DL
MCHC RBC-ENTMCNC: 23.3 PG
MCHC RBC-ENTMCNC: 31.2 G/DL
MCV RBC AUTO: 74.5 FL
PLATELET # BLD AUTO: 12 K/UL
PMV BLD: NORMAL
RBC # BLD: 5.97 M/UL
RBC # FLD: 18.4 %
SARS-COV-2 RNA SPEC QL NAA+PROBE: SIGNIFICANT CHANGE UP
WBC # FLD AUTO: 15.01 K/UL

## 2021-05-21 PROCEDURE — 99215 OFFICE O/P EST HI 40 MIN: CPT

## 2021-05-21 PROCEDURE — 99285 EMERGENCY DEPT VISIT HI MDM: CPT

## 2021-05-21 RX ORDER — SODIUM CHLORIDE 9 MG/ML
1000 INJECTION, SOLUTION INTRAVENOUS
Refills: 0 | Status: DISCONTINUED | OUTPATIENT
Start: 2021-05-21 | End: 2021-05-24

## 2021-05-21 RX ORDER — IMMUNE GLOBULIN (HUMAN) 10 G/100ML
130 INJECTION INTRAVENOUS; SUBCUTANEOUS ONCE
Refills: 0 | Status: COMPLETED | OUTPATIENT
Start: 2021-05-21 | End: 2021-05-21

## 2021-05-21 RX ORDER — DEXTROSE 50 % IN WATER 50 %
25 SYRINGE (ML) INTRAVENOUS ONCE
Refills: 0 | Status: DISCONTINUED | OUTPATIENT
Start: 2021-05-21 | End: 2021-05-24

## 2021-05-21 RX ORDER — LOSARTAN POTASSIUM 100 MG/1
100 TABLET, FILM COATED ORAL DAILY
Refills: 0 | Status: DISCONTINUED | OUTPATIENT
Start: 2021-05-21 | End: 2021-05-24

## 2021-05-21 RX ORDER — INSULIN LISPRO 100/ML
VIAL (ML) SUBCUTANEOUS
Refills: 0 | Status: DISCONTINUED | OUTPATIENT
Start: 2021-05-21 | End: 2021-05-24

## 2021-05-21 RX ORDER — PANTOPRAZOLE SODIUM 20 MG/1
40 TABLET, DELAYED RELEASE ORAL
Refills: 0 | Status: DISCONTINUED | OUTPATIENT
Start: 2021-05-21 | End: 2021-05-24

## 2021-05-21 RX ORDER — INSULIN LISPRO 100/ML
7 VIAL (ML) SUBCUTANEOUS
Refills: 0 | Status: DISCONTINUED | OUTPATIENT
Start: 2021-05-21 | End: 2021-05-24

## 2021-05-21 RX ORDER — ATORVASTATIN CALCIUM 80 MG/1
40 TABLET, FILM COATED ORAL AT BEDTIME
Refills: 0 | Status: DISCONTINUED | OUTPATIENT
Start: 2021-05-21 | End: 2021-05-24

## 2021-05-21 RX ORDER — DEXTROSE 50 % IN WATER 50 %
12.5 SYRINGE (ML) INTRAVENOUS ONCE
Refills: 0 | Status: DISCONTINUED | OUTPATIENT
Start: 2021-05-21 | End: 2021-05-24

## 2021-05-21 RX ORDER — GLUCAGON INJECTION, SOLUTION 0.5 MG/.1ML
1 INJECTION, SOLUTION SUBCUTANEOUS ONCE
Refills: 0 | Status: DISCONTINUED | OUTPATIENT
Start: 2021-05-21 | End: 2021-05-24

## 2021-05-21 RX ORDER — HYDROCHLOROTHIAZIDE 25 MG
25 TABLET ORAL DAILY
Refills: 0 | Status: DISCONTINUED | OUTPATIENT
Start: 2021-05-21 | End: 2021-05-24

## 2021-05-21 RX ORDER — AMLODIPINE BESYLATE 2.5 MG/1
5 TABLET ORAL DAILY
Refills: 0 | Status: DISCONTINUED | OUTPATIENT
Start: 2021-05-21 | End: 2021-05-24

## 2021-05-21 RX ORDER — DEXTROSE 50 % IN WATER 50 %
15 SYRINGE (ML) INTRAVENOUS ONCE
Refills: 0 | Status: DISCONTINUED | OUTPATIENT
Start: 2021-05-21 | End: 2021-05-24

## 2021-05-21 RX ORDER — FLUTICASONE PROPIONATE 50 MCG
1 SPRAY, SUSPENSION NASAL
Refills: 0 | Status: DISCONTINUED | OUTPATIENT
Start: 2021-05-21 | End: 2021-05-24

## 2021-05-21 RX ORDER — CHLORHEXIDINE GLUCONATE 213 G/1000ML
1 SOLUTION TOPICAL
Refills: 0 | Status: DISCONTINUED | OUTPATIENT
Start: 2021-05-21 | End: 2021-05-24

## 2021-05-21 RX ORDER — METFORMIN HYDROCHLORIDE 850 MG/1
1 TABLET ORAL
Qty: 0 | Refills: 0 | DISCHARGE

## 2021-05-21 RX ORDER — INSULIN GLARGINE 100 [IU]/ML
20 INJECTION, SOLUTION SUBCUTANEOUS AT BEDTIME
Refills: 0 | Status: DISCONTINUED | OUTPATIENT
Start: 2021-05-21 | End: 2021-05-24

## 2021-05-21 RX ADMIN — INSULIN GLARGINE 20 UNIT(S): 100 INJECTION, SOLUTION SUBCUTANEOUS at 21:54

## 2021-05-21 RX ADMIN — IMMUNE GLOBULIN (HUMAN) 650 GRAM(S): 10 INJECTION INTRAVENOUS; SUBCUTANEOUS at 12:51

## 2021-05-21 RX ADMIN — ATORVASTATIN CALCIUM 40 MILLIGRAM(S): 80 TABLET, FILM COATED ORAL at 21:54

## 2021-05-21 RX ADMIN — Medication 7 UNIT(S): at 17:42

## 2021-05-21 NOTE — ED ADULT NURSE NOTE - OBJECTIVE STATEMENT
Pt sent in from [facility] for IVIG infusion for low platelet count. Pt denies any symptoms. Pt has suspected ITP.

## 2021-05-21 NOTE — ED PROVIDER NOTE - INTERNATIONAL TRAVEL
Patient was scheduled for 12/17/18. This RN does not want patient to wait that long for appointment. Contacted patient, rescheduled appointment to 12/11/18 with Aryan    No

## 2021-05-21 NOTE — ED ADULT TRIAGE NOTE - CHIEF COMPLAINT QUOTE
Pt sent in by [facility] for IVIG infusion for low platelet count. Pt denies any symptoms. Pt has suspected ITP.

## 2021-05-21 NOTE — ASSESSMENT
[FreeTextEntry1] : 65 yo man with isolated thrombocytopenia of 5K of unclear etiology. 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 Viral w/u and rheum w/u completion is pending. He is asymptomatic. Treated with 2 doses of IVIG 1G/KG and has started prednisone 1mg/kg since 5/7/21 with good response.\par \par He had platelets of 106 in June 2020, decreased to 50s in January. He did receive the Moderna vaccine in January and February. \par \par Immune thrombocytopenia:\par - Hep B/C/HIV, EBV IgM, CMV IgM negative\par - Smear in the hopsital did not show clumps, did have large platelets, no schistocytes, No dyspplastic or immature WBC.\par - US spleen wnl\par - HORACIO 1:160 \par - it is not common if ITP developed at his age; we will defer bone marrow biopsy at this time\par strict f/u w/ PMD/endocrinology to control his DM management\par \par \par 5/14/21: START TAPERING: decrase prednisone from 130mg to 100mg beginning 5.15.21 and f/u 5/18/21\par F/u endocrinology\par 518/21: Platlelets decreased to 28. We reviewed the smear and there was no clumping, 2-3 platelets seen per HPF. We told him to take an aditional 30mg today and will increase back to 130mg QD. He will return to the clinic early Friday for repeat CBC with smear. If count is lower we will give IVIG on Friday. Will likely refer to rheum for elevated HORACIO. \par \par Seen/examiened w/ Kale Terry DO\par \par 65 yo man w/ decreasing platelets count over the last year, s/p MODERNA earlier this year, admitted w/ isolated thrombocytopenia of 5K, \par \par started on IV IG 1G/kg daily x2 (5/7/21-5/8/21) and on prednisone 1mg/kg beginning 5/7/21. \par \par Platelets counts improved to 116K (5/11/21) and \par 133K (5/14/21), when the dose of prednisone was decreased from 130mg to 100mg daily: \par 5/18/21: 28K , and dose of prednisone was increased back up to 130mg daily\par 5/21/21: 12K\par \par HORACIO +\par \par PLAN:\par - concerned if this is refractory disease vs effect of IVIG rather than prednisone; his current dose to 130mg daily\par - if platelets count improves, will continue steroids and adjust the dose as needed\par - if platelets count does not improve, will consider TPO or rituxan\par - rheum consult is under consideration given his HORACIO+\par - emotional support given; case discussed within oncology group\par - expressed concern for refractory thrombocytopenia since his PLTs are dropping despite high dose steroid (1mg/kg dosing); we had a conversation regarding role of IVIG and possibly Rituximab.  Side effects discussed.\par - bone marrow bx is under consideration given the demographics\par - will administer IVIG 1G/KG today, 5/21/21\par - c/w prednisone 130mg daily\par \par Following IVIG infusion today, we will send him to ER for further workup with plan to administer IVIG in the hospital as well; he is aware of possibly starting Rituxan if his counts do not recover as he may be refractory to current treatment and has risk for PLTs to continue to drop at home.  \par \par ADDENDUM:\par Made decision NOT to start IVIG in [place] today; will send to ER and start IV IG 1G/KG daily x2 beginning ASAP 5/21/21

## 2021-05-21 NOTE — PHARMACOTHERAPY INTERVENTION NOTE - COMMENTS
I spoke with Dr Keane and patient weighs 130 kg. Therefore, dose is adjusted from 95 gms to 130 gms.  Dose was recommended by hemc-onc

## 2021-05-21 NOTE — HISTORY OF PRESENT ILLNESS
[de-identified] : Mr. Dalal is a 66 year old male with PMH of DM, HTN, DLD, MAICOL who was recently admitted for suspected ITP. Patient was noted to have asymptomatic thrombocytopenia as an outpatient and referred to the ED. In the ED his platelet count was 5K with normal WBC and Hg. His smear was reviewed and showed 1-2 schistocytes, no clumping, large platelets, decrease number of platelets, normal WBC morphology/no blasts, some target cells. He was started on prednisone 1 mg/kg of prednisone, and he received two doses of IVIG. His platelets improved to 38 and he was discharged on prednisone. \par Of note, his platelets were 106 in June 2020, ad then 53 in January 2021. Also in January and then in February he received the Moderna vaccine. He does have high RBC count (6.45) and low MCV (73).\par Workup in the hospital showed normal US spleen, no evidence of hemolysis, normal LDH, hapto and ret count, HIV, HepB, HepC EBV IgM, CMV IgM NR , HIT negative , RF negative, peripheral flow negative\par \par Patient informed us he was not discharged with needles for his insulin. His fingersticks have in the low 200s. \par His platelets today are 116. No bleeding, bruising or petechiae. \par \par 5/14/2021- pt returns for follow up today.  feeling well.  some mild bruising where labs drawn otherwise no other bruising.  wbc 10.49 hgb 13.5 hct 23.0 platelets now 133. \par pt made f/up appt with new fer Doss for this monday 5/17/2021. P t checking FS at home sugar 130 today.   [de-identified] : 5/18/21: Mr. Dalal returns for followup. Since Saturday he has been taking 100mg prednsone daily, he was on 130mg prior to this. No evidence of bleeding, petechiae. His platelets dropped to 28K. We reviewed the smear and there was no clumping, 2-3 platelets per HPF. We told him to take an additional 30mg today.\par \par 5/21/21\par Patient is here for a follow-up visit for immune thrombocytopenia.  He is feeling well with no new complaints.  Reviewed most recent CBC, which shows PLTs are down to 12,000 from 28,000 earlier this week.  His PLT dropped even with increase of prednisone back up to 130mg daily.  Patient denies easier bruising or bleeding.

## 2021-05-21 NOTE — H&P ADULT - ASSESSMENT
65 y/o male with PMH of DM, HTN, DLD, MAICOL, recent diagnosis of ITP sent from Dr Rondon's office for thrombocytopenia.    # ITP  - Platelets count 12 on cbc as OP, repeated cbc 36 on admission  - s/p 2 infusions of IVIG on last admission 2 weeks ago  - was discharged on 1mg/kg of prednisone with compliance  - restart IVIG  - c/w prednisone  - Stop aspirin  - Possible bone marrow biopsy  - c/s hem/onc ( Dr Rondon)    # HTN  - c/w HCTZ, losartan and amlodipine    # DM  - c/w basal and nutritional insulin  - monitor BS    # DVT ppx: SCD  # PPI ppx: pantoprazole  # DIET: DASH, carb consistent  # ACTIVITY: IAT  # DISPO: From home

## 2021-05-21 NOTE — ED PROVIDER NOTE - OBJECTIVE STATEMENT
66M with past medical history of ITP, HTN, HLD, DM and OSD presents for suspected ITP. PT was found to have asymptomatic thrombocytopenia outpatient and referred to the ED after which he has been treated with steroids outpatient but PT with failed outpt treatment and so sent to the ED for further IVIG. Denies fever, chills, cough, chest pain, shortness of breath, nausea, vomiting, diarrhea.

## 2021-05-21 NOTE — ED PROVIDER NOTE - PHYSICAL EXAMINATION
RX for review CONSTITUTIONAL: in no acute distress.   SKIN: warm, dry  HEAD: Normocephalic.  EYES: PERRL.  ENT: Airway clear.  NECK: Supple.  LYMPH: No acute cervical adenopathy.  CARD: Regular rate and rhythm.   RESP: No wheezing, rales or rhonchi.  ABD: soft ntnd  EXT: No clubbing, cyanosis.   NEURO: Alert, oriented.  PSYCH: Cooperative, appropriate.

## 2021-05-21 NOTE — H&P ADULT - HISTORY OF PRESENT ILLNESS
67 y/o male with PMH of DM, HTN, DLD, MAICOL, ITP sent from Dr Rondon's office after blood work showed low platelets count. patient was admitted 2 weeks ago for the same complaint, blood work as outpatient showed platelet count of 8 so he was sent for further investigation. Workup done at that time showed possible ITP as the etiology for his thrombocytopenia and he received 2 IVIG infusions and was discharged on prednisone treatment ( 130mg/day) which he has been taking regularly. He followed up with Dr Rondon this week who decreased his prednisone dose to 100 mg/day. Blood work done today showed platelets count of 12 so he was sent again to the ED. He denied fevers, chills, mucosal or any other source of bleeding, nausea or vomiting, melena, hematochezia, hematuria or other associated symptoms.    In the ED VS were normal.  He was started on IVIG infusion.

## 2021-05-21 NOTE — CONSULT LETTER
[Dear  ___] : Dear  [unfilled], [Consult Letter:] : I had the pleasure of evaluating your patient, [unfilled]. [Please see my note below.] : Please see my note below. [Sincerely,] : Sincerely, [FreeTextEntry3] : Elvis Rondon DO\par Attending Physician,\par Hematology/ Medical Oncology\par [phone] office\par \par

## 2021-05-21 NOTE — ED ADULT NURSE NOTE - CHIEF COMPLAINT QUOTE
Pt sent in by [facility] for IVIG infusion for low platelet count. Pt denies any symptoms. Pt has suspected ITP.

## 2021-05-21 NOTE — ED PROVIDER NOTE - PROGRESS NOTE DETAILS
SC: Spoke with H/O Dr. Terry who recommend IVIG and admission. PT already took prednisone this AM. Authored by Dr. Keane: d/w dr araujo - IVIG 130gm and prednisone 130gm.  admit to his svc.

## 2021-05-21 NOTE — ED PROVIDER NOTE - ATTENDING CONTRIBUTION TO CARE
pt with? TTP.  requires admission for IVIG. sent from heme/onc office by dr travis, cbc done in office and available in HIE - wbc 15, h/h 13/44, plt 12K.  vss, nontoxic, well appearing, pink conj, anicteric, MMM, neck supple, CTAB, RRR, equal radial pulses bilat, abd soft/nt/nd, no cva tend. no calves tend, no edema, no fnd. no rashes.

## 2021-05-21 NOTE — PHYSICAL EXAM
[Restricted in physically strenuous activity but ambulatory and able to carry out work of a light or sedentary nature] : Status 1- Restricted in physically strenuous activity but ambulatory and able to carry out work of a light or sedentary nature, e.g., light house work, office work [Obese] : obese [Normal] : affect appropriate [de-identified] : no bleed  [de-identified] : no mucocutaneous bleed

## 2021-05-21 NOTE — PATIENT PROFILE ADULT - NSPROHMDIABETBLDGLCTARGET_GEN_A_NUR
[facility]    [address] [place] [address]    Phone:  [phone]    Fax:  [phone]       Thank You for choosing us for your health care visit. We are glad to serve you and happy to provide you with this summary of your visit. Please help us to ensure we have accurate records. If you find anything that needs to be changed, please let our staff know as soon as possible.          Your Demographic Information     Patient Name Sex     Steven Shi Male 1956       Ethnic Group Patient Race    Not of  or  Origin White      Your Visit Details     Date & Time Provider Department    2017 4:00 PM Gabriel Martinez MD [facility]      Your Upcoming Appointment*(Max 10)       4:00 PM CDT   Office Visit with Herbie Taylor MD   [facility] ([facility])    [address] [place] [address]   [phone]            2017  4:15 PM CST   Office Visit with Gabriel Martinez MD   [facility] ([facility])    [address] [place] [address]   [phone]              Your To Do List     Follow-Up    Return for 1st week 2017 [employer] teaching.      Conditions Discussed Today or Order-Related Diagnoses        Comments    AK (actinic keratosis)    -  Primary       Your Vitals Were     Smoking Status                   Never Smoker           Medications Prescribed or Re-Ordered Today     None      Your Current Medications Are        Disp Refills Start End    aspirin 81 MG chewable tablet        Sig - Route: Chew 81 mg by mouth daily. - Oral    Class: Historical Med    Multiple Vitamins-Minerals (MULTIVITAMIN ADULT PO)        Class: Historical Med    Route: Oral    enalapril (VASOTEC) 5 MG tablet        Sig - Route: Take 5 mg by  mouth daily. - Oral    Class: Historical Med    atenolol (TENORMIN) 100 MG tablet        Sig - Route: Take 100 mg by mouth daily. - Oral    Class: Historical Med    allopurinol (ZYLOPRIM) 100 MG tablet        Sig - Route: Take 100 mg by mouth daily. - Oral    Class: Historical Med    simvastatin (ZOCOR) 20 MG tablet        Sig - Route: Take 20 mg by mouth nightly. - Oral    Class: Historical Med    furosemide (LASIX) 40 MG tablet        Sig - Route: Take 40 mg by mouth 2 times daily. - Oral    Class: Historical Med    gabapentin (NEURONTIN) 400 MG capsule        Sig - Route: Take 400 mg by mouth 3 times daily. - Oral    Class: Historical Med      Allergies     No Known Allergies      Problem List as of 5/4/2017     Idiopathic peripheral neuropathy    Abnormal sensory exam    Numbness and tingling    Muscle cramps    Idiopathic sensorimotor axonal neuropathy    Carpal tunnel syndrome of left wrist            Patient Instructions     None       unknown

## 2021-05-22 LAB
ALBUMIN SERPL ELPH-MCNC: 3.4 G/DL — LOW (ref 3.5–5.2)
ALP SERPL-CCNC: 53 U/L — SIGNIFICANT CHANGE UP (ref 30–115)
ALT FLD-CCNC: 36 U/L — SIGNIFICANT CHANGE UP (ref 0–41)
ANION GAP SERPL CALC-SCNC: 10 MMOL/L — SIGNIFICANT CHANGE UP (ref 7–14)
AST SERPL-CCNC: 27 U/L — SIGNIFICANT CHANGE UP (ref 0–41)
BASOPHILS # BLD AUTO: 0.02 K/UL — SIGNIFICANT CHANGE UP (ref 0–0.2)
BASOPHILS NFR BLD AUTO: 0.2 % — SIGNIFICANT CHANGE UP (ref 0–1)
BILIRUB SERPL-MCNC: 0.8 MG/DL — SIGNIFICANT CHANGE UP (ref 0.2–1.2)
BLD GP AB SCN SERPL QL: SIGNIFICANT CHANGE UP
BUN SERPL-MCNC: 27 MG/DL — HIGH (ref 10–20)
CALCIUM SERPL-MCNC: 8.5 MG/DL — SIGNIFICANT CHANGE UP (ref 8.5–10.1)
CHLORIDE SERPL-SCNC: 95 MMOL/L — LOW (ref 98–110)
CO2 SERPL-SCNC: 27 MMOL/L — SIGNIFICANT CHANGE UP (ref 17–32)
COVID-19 SPIKE DOMAIN AB INTERP: POSITIVE
COVID-19 SPIKE DOMAIN ANTIBODY RESULT: 219 U/ML — HIGH
CREAT SERPL-MCNC: 0.9 MG/DL — SIGNIFICANT CHANGE UP (ref 0.7–1.5)
EOSINOPHIL # BLD AUTO: 0.02 K/UL — SIGNIFICANT CHANGE UP (ref 0–0.7)
EOSINOPHIL NFR BLD AUTO: 0.2 % — SIGNIFICANT CHANGE UP (ref 0–8)
GLUCOSE BLDC GLUCOMTR-MCNC: 169 MG/DL — HIGH (ref 70–99)
GLUCOSE BLDC GLUCOMTR-MCNC: 206 MG/DL — HIGH (ref 70–99)
GLUCOSE BLDC GLUCOMTR-MCNC: 212 MG/DL — HIGH (ref 70–99)
GLUCOSE BLDC GLUCOMTR-MCNC: 235 MG/DL — HIGH (ref 70–99)
GLUCOSE SERPL-MCNC: 144 MG/DL — HIGH (ref 70–99)
HCT VFR BLD CALC: 41.6 % — LOW (ref 42–52)
HGB BLD-MCNC: 12.7 G/DL — LOW (ref 14–18)
IMM GRANULOCYTES NFR BLD AUTO: 1.1 % — HIGH (ref 0.1–0.3)
LYMPHOCYTES # BLD AUTO: 1.27 K/UL — SIGNIFICANT CHANGE UP (ref 1.2–3.4)
LYMPHOCYTES # BLD AUTO: 11.7 % — LOW (ref 20.5–51.1)
MCHC RBC-ENTMCNC: 22.9 PG — LOW (ref 27–31)
MCHC RBC-ENTMCNC: 30.5 G/DL — LOW (ref 32–37)
MCV RBC AUTO: 75 FL — LOW (ref 80–94)
MONOCYTES # BLD AUTO: 0.44 K/UL — SIGNIFICANT CHANGE UP (ref 0.1–0.6)
MONOCYTES NFR BLD AUTO: 4.1 % — SIGNIFICANT CHANGE UP (ref 1.7–9.3)
NEUTROPHILS # BLD AUTO: 8.96 K/UL — HIGH (ref 1.4–6.5)
NEUTROPHILS NFR BLD AUTO: 82.7 % — HIGH (ref 42.2–75.2)
NRBC # BLD: 0 /100 WBCS — SIGNIFICANT CHANGE UP (ref 0–0)
PLATELET # BLD AUTO: 14 K/UL — CRITICAL LOW (ref 130–400)
POTASSIUM SERPL-MCNC: 3.8 MMOL/L — SIGNIFICANT CHANGE UP (ref 3.5–5)
POTASSIUM SERPL-SCNC: 3.8 MMOL/L — SIGNIFICANT CHANGE UP (ref 3.5–5)
PROT SERPL-MCNC: 8.2 G/DL — HIGH (ref 6–8)
RBC # BLD: 5.55 M/UL — SIGNIFICANT CHANGE UP (ref 4.7–6.1)
RBC # FLD: 18.7 % — HIGH (ref 11.5–14.5)
SARS-COV-2 IGG+IGM SERPL QL IA: 219 U/ML — HIGH
SARS-COV-2 IGG+IGM SERPL QL IA: POSITIVE
SODIUM SERPL-SCNC: 132 MMOL/L — LOW (ref 135–146)
WBC # BLD: 10.83 K/UL — HIGH (ref 4.8–10.8)
WBC # FLD AUTO: 10.83 K/UL — HIGH (ref 4.8–10.8)

## 2021-05-22 PROCEDURE — 99232 SBSQ HOSP IP/OBS MODERATE 35: CPT

## 2021-05-22 RX ORDER — IMMUNE GLOBULIN (HUMAN) 10 G/100ML
130 INJECTION INTRAVENOUS; SUBCUTANEOUS ONCE
Refills: 0 | Status: COMPLETED | OUTPATIENT
Start: 2021-05-22 | End: 2021-05-22

## 2021-05-22 RX ADMIN — INSULIN GLARGINE 20 UNIT(S): 100 INJECTION, SOLUTION SUBCUTANEOUS at 22:30

## 2021-05-22 RX ADMIN — IMMUNE GLOBULIN (HUMAN) 185.71 GRAM(S): 10 INJECTION INTRAVENOUS; SUBCUTANEOUS at 14:42

## 2021-05-22 RX ADMIN — PANTOPRAZOLE SODIUM 40 MILLIGRAM(S): 20 TABLET, DELAYED RELEASE ORAL at 05:28

## 2021-05-22 RX ADMIN — Medication 4: at 17:01

## 2021-05-22 RX ADMIN — LOSARTAN POTASSIUM 100 MILLIGRAM(S): 100 TABLET, FILM COATED ORAL at 05:28

## 2021-05-22 RX ADMIN — Medication 4: at 11:54

## 2021-05-22 RX ADMIN — Medication 7 UNIT(S): at 17:01

## 2021-05-22 RX ADMIN — AMLODIPINE BESYLATE 5 MILLIGRAM(S): 2.5 TABLET ORAL at 05:27

## 2021-05-22 RX ADMIN — Medication 2: at 08:04

## 2021-05-22 RX ADMIN — Medication 1 SPRAY(S): at 17:02

## 2021-05-22 RX ADMIN — Medication 7 UNIT(S): at 11:53

## 2021-05-22 RX ADMIN — Medication 7 UNIT(S): at 08:04

## 2021-05-22 RX ADMIN — Medication 25 MILLIGRAM(S): at 05:27

## 2021-05-22 RX ADMIN — Medication 130 MILLIGRAM(S): at 08:08

## 2021-05-22 RX ADMIN — Medication 1 SPRAY(S): at 05:27

## 2021-05-23 LAB
ANION GAP SERPL CALC-SCNC: 10 MMOL/L — SIGNIFICANT CHANGE UP (ref 7–14)
BUN SERPL-MCNC: 30 MG/DL — HIGH (ref 10–20)
CALCIUM SERPL-MCNC: 8.4 MG/DL — LOW (ref 8.5–10.1)
CHLORIDE SERPL-SCNC: 95 MMOL/L — LOW (ref 98–110)
CO2 SERPL-SCNC: 25 MMOL/L — SIGNIFICANT CHANGE UP (ref 17–32)
CREAT SERPL-MCNC: 1 MG/DL — SIGNIFICANT CHANGE UP (ref 0.7–1.5)
GLUCOSE BLDC GLUCOMTR-MCNC: 141 MG/DL — HIGH (ref 70–99)
GLUCOSE BLDC GLUCOMTR-MCNC: 168 MG/DL — HIGH (ref 70–99)
GLUCOSE BLDC GLUCOMTR-MCNC: 168 MG/DL — HIGH (ref 70–99)
GLUCOSE BLDC GLUCOMTR-MCNC: 286 MG/DL — HIGH (ref 70–99)
GLUCOSE SERPL-MCNC: 269 MG/DL — HIGH (ref 70–99)
HCT VFR BLD CALC: 40.8 % — LOW (ref 42–52)
HGB BLD-MCNC: 12.8 G/DL — LOW (ref 14–18)
MCHC RBC-ENTMCNC: 23.3 PG — LOW (ref 27–31)
MCHC RBC-ENTMCNC: 31.4 G/DL — LOW (ref 32–37)
MCV RBC AUTO: 74.2 FL — LOW (ref 80–94)
NRBC # BLD: 0 /100 WBCS — SIGNIFICANT CHANGE UP (ref 0–0)
PLATELET # BLD AUTO: 30 K/UL — LOW (ref 130–400)
POTASSIUM SERPL-MCNC: 4.4 MMOL/L — SIGNIFICANT CHANGE UP (ref 3.5–5)
POTASSIUM SERPL-SCNC: 4.4 MMOL/L — SIGNIFICANT CHANGE UP (ref 3.5–5)
RBC # BLD: 5.5 M/UL — SIGNIFICANT CHANGE UP (ref 4.7–6.1)
RBC # FLD: 18.6 % — HIGH (ref 11.5–14.5)
SODIUM SERPL-SCNC: 130 MMOL/L — LOW (ref 135–146)
WBC # BLD: 9.37 K/UL — SIGNIFICANT CHANGE UP (ref 4.8–10.8)
WBC # FLD AUTO: 9.37 K/UL — SIGNIFICANT CHANGE UP (ref 4.8–10.8)

## 2021-05-23 PROCEDURE — 99231 SBSQ HOSP IP/OBS SF/LOW 25: CPT

## 2021-05-23 RX ADMIN — Medication 1 SPRAY(S): at 17:38

## 2021-05-23 RX ADMIN — PANTOPRAZOLE SODIUM 40 MILLIGRAM(S): 20 TABLET, DELAYED RELEASE ORAL at 05:20

## 2021-05-23 RX ADMIN — Medication 1 SPRAY(S): at 05:21

## 2021-05-23 RX ADMIN — INSULIN GLARGINE 20 UNIT(S): 100 INJECTION, SOLUTION SUBCUTANEOUS at 21:32

## 2021-05-23 RX ADMIN — Medication 7 UNIT(S): at 11:46

## 2021-05-23 RX ADMIN — Medication 2: at 07:55

## 2021-05-23 RX ADMIN — LOSARTAN POTASSIUM 100 MILLIGRAM(S): 100 TABLET, FILM COATED ORAL at 05:20

## 2021-05-23 RX ADMIN — Medication 2: at 17:37

## 2021-05-23 RX ADMIN — Medication 7 UNIT(S): at 17:37

## 2021-05-23 RX ADMIN — Medication 6: at 11:46

## 2021-05-23 RX ADMIN — CHLORHEXIDINE GLUCONATE 1 APPLICATION(S): 213 SOLUTION TOPICAL at 05:20

## 2021-05-23 RX ADMIN — Medication 130 MILLIGRAM(S): at 05:21

## 2021-05-23 RX ADMIN — Medication 7 UNIT(S): at 07:55

## 2021-05-23 RX ADMIN — AMLODIPINE BESYLATE 5 MILLIGRAM(S): 2.5 TABLET ORAL at 05:22

## 2021-05-23 RX ADMIN — ATORVASTATIN CALCIUM 40 MILLIGRAM(S): 80 TABLET, FILM COATED ORAL at 21:33

## 2021-05-23 RX ADMIN — Medication 25 MILLIGRAM(S): at 05:22

## 2021-05-23 RX ADMIN — ATORVASTATIN CALCIUM 40 MILLIGRAM(S): 80 TABLET, FILM COATED ORAL at 03:01

## 2021-05-24 ENCOUNTER — TRANSCRIPTION ENCOUNTER (OUTPATIENT)
Age: 66
End: 2021-05-24

## 2021-05-24 VITALS
DIASTOLIC BLOOD PRESSURE: 71 MMHG | RESPIRATION RATE: 18 BRPM | SYSTOLIC BLOOD PRESSURE: 118 MMHG | HEART RATE: 64 BPM | TEMPERATURE: 98 F

## 2021-05-24 PROBLEM — D69.3 IMMUNE THROMBOCYTOPENIC PURPURA: Chronic | Status: ACTIVE | Noted: 2021-05-21

## 2021-05-24 LAB
ALBUMIN SERPL ELPH-MCNC: 3.2 G/DL — LOW (ref 3.5–5.2)
ALP SERPL-CCNC: 45 U/L — SIGNIFICANT CHANGE UP (ref 30–115)
ALT FLD-CCNC: 40 U/L — SIGNIFICANT CHANGE UP (ref 0–41)
ANION GAP SERPL CALC-SCNC: 9 MMOL/L — SIGNIFICANT CHANGE UP (ref 7–14)
AST SERPL-CCNC: 29 U/L — SIGNIFICANT CHANGE UP (ref 0–41)
BASOPHILS # BLD AUTO: 0.01 K/UL — SIGNIFICANT CHANGE UP (ref 0–0.2)
BASOPHILS NFR BLD AUTO: 0.1 % — SIGNIFICANT CHANGE UP (ref 0–1)
BILIRUB SERPL-MCNC: 2 MG/DL — HIGH (ref 0.2–1.2)
BUN SERPL-MCNC: 27 MG/DL — HIGH (ref 10–20)
CALCIUM SERPL-MCNC: 8.1 MG/DL — LOW (ref 8.5–10.1)
CHLORIDE SERPL-SCNC: 99 MMOL/L — SIGNIFICANT CHANGE UP (ref 98–110)
CO2 SERPL-SCNC: 25 MMOL/L — SIGNIFICANT CHANGE UP (ref 17–32)
CREAT SERPL-MCNC: 0.9 MG/DL — SIGNIFICANT CHANGE UP (ref 0.7–1.5)
EOSINOPHIL # BLD AUTO: 0.05 K/UL — SIGNIFICANT CHANGE UP (ref 0–0.7)
EOSINOPHIL NFR BLD AUTO: 0.4 % — SIGNIFICANT CHANGE UP (ref 0–8)
GLUCOSE BLDC GLUCOMTR-MCNC: 159 MG/DL — HIGH (ref 70–99)
GLUCOSE BLDC GLUCOMTR-MCNC: 278 MG/DL — HIGH (ref 70–99)
GLUCOSE SERPL-MCNC: 104 MG/DL — HIGH (ref 70–99)
HCT VFR BLD CALC: 38.5 % — LOW (ref 42–52)
HCT VFR BLD CALC: 43.2 %
HGB BLD-MCNC: 11.9 G/DL — LOW (ref 14–18)
HGB BLD-MCNC: 13.5 G/DL
IMM GRANULOCYTES NFR BLD AUTO: 1 % — HIGH (ref 0.1–0.3)
LYMPHOCYTES # BLD AUTO: 2.29 K/UL — SIGNIFICANT CHANGE UP (ref 1.2–3.4)
LYMPHOCYTES # BLD AUTO: 20.5 % — SIGNIFICANT CHANGE UP (ref 20.5–51.1)
MCHC RBC-ENTMCNC: 23 PG
MCHC RBC-ENTMCNC: 23.1 PG — LOW (ref 27–31)
MCHC RBC-ENTMCNC: 30.9 G/DL — LOW (ref 32–37)
MCHC RBC-ENTMCNC: 31.3 G/DL
MCV RBC AUTO: 73.7 FL
MCV RBC AUTO: 74.8 FL — LOW (ref 80–94)
MONOCYTES # BLD AUTO: 0.73 K/UL — HIGH (ref 0.1–0.6)
MONOCYTES NFR BLD AUTO: 6.5 % — SIGNIFICANT CHANGE UP (ref 1.7–9.3)
NEUTROPHILS # BLD AUTO: 7.98 K/UL — HIGH (ref 1.4–6.5)
NEUTROPHILS NFR BLD AUTO: 71.5 % — SIGNIFICANT CHANGE UP (ref 42.2–75.2)
NRBC # BLD: 0 /100 WBCS — SIGNIFICANT CHANGE UP (ref 0–0)
PLATELET # BLD AUTO: 133 K/UL
PLATELET # BLD AUTO: 51 K/UL — LOW (ref 130–400)
PMV BLD: NORMAL
POTASSIUM SERPL-MCNC: 3.9 MMOL/L — SIGNIFICANT CHANGE UP (ref 3.5–5)
POTASSIUM SERPL-SCNC: 3.9 MMOL/L — SIGNIFICANT CHANGE UP (ref 3.5–5)
PROT SERPL-MCNC: 8.6 G/DL — HIGH (ref 6–8)
RBC # BLD: 5.15 M/UL — SIGNIFICANT CHANGE UP (ref 4.7–6.1)
RBC # BLD: 5.86 M/UL
RBC # FLD: 17.5 %
RBC # FLD: 18.1 % — HIGH (ref 11.5–14.5)
SODIUM SERPL-SCNC: 133 MMOL/L — LOW (ref 135–146)
WBC # BLD: 11.17 K/UL — HIGH (ref 4.8–10.8)
WBC # FLD AUTO: 10.49 K/UL
WBC # FLD AUTO: 11.17 K/UL — HIGH (ref 4.8–10.8)

## 2021-05-24 PROCEDURE — 99232 SBSQ HOSP IP/OBS MODERATE 35: CPT

## 2021-05-24 RX ORDER — PANTOPRAZOLE SODIUM 20 MG/1
1 TABLET, DELAYED RELEASE ORAL
Qty: 14 | Refills: 0
Start: 2021-05-24

## 2021-05-24 RX ADMIN — CHLORHEXIDINE GLUCONATE 1 APPLICATION(S): 213 SOLUTION TOPICAL at 05:06

## 2021-05-24 RX ADMIN — Medication 7 UNIT(S): at 11:13

## 2021-05-24 RX ADMIN — Medication 25 MILLIGRAM(S): at 05:07

## 2021-05-24 RX ADMIN — PANTOPRAZOLE SODIUM 40 MILLIGRAM(S): 20 TABLET, DELAYED RELEASE ORAL at 05:07

## 2021-05-24 RX ADMIN — LOSARTAN POTASSIUM 100 MILLIGRAM(S): 100 TABLET, FILM COATED ORAL at 05:06

## 2021-05-24 RX ADMIN — Medication 2: at 07:54

## 2021-05-24 RX ADMIN — Medication 1 SPRAY(S): at 05:06

## 2021-05-24 RX ADMIN — Medication 7 UNIT(S): at 07:54

## 2021-05-24 RX ADMIN — AMLODIPINE BESYLATE 5 MILLIGRAM(S): 2.5 TABLET ORAL at 05:06

## 2021-05-24 RX ADMIN — Medication 130 MILLIGRAM(S): at 05:07

## 2021-05-24 RX ADMIN — Medication 6: at 11:14

## 2021-05-24 NOTE — DISCHARGE NOTE PROVIDER - CARE PROVIDER_API CALL
Elvis Rondon (DO)  Hematology; Internal Medicine; Medical Oncology  [address] [place] [address]  Phone: [phone]  Fax: [phone]  Established Patient  Scheduled Appointment: 05/26/2021

## 2021-05-24 NOTE — DISCHARGE NOTE NURSING/CASE MANAGEMENT/SOCIAL WORK - PATIENT PORTAL LINK FT
You can access the FollowMyHealth Patient Portal offered by [facility] by registering at the following website: [email or web address]. By joining [facility]’s FollowMyHealth portal, you will also be able to view your health information using other applications (apps) compatible with our system.

## 2021-05-24 NOTE — DISCHARGE NOTE PROVIDER - HOSPITAL COURSE
65 y/o male with PMH of DM, HTN, DLD, MAICOL, ITP sent from Dr Rondon's office after blood work showed low platelets count. patient was admitted 2 weeks ago for the same complaint, blood work as outpatient showed platelet count of 8 so he was sent for further investigation. Workup done at that time showed possible ITP as the etiology for his thrombocytopenia and he received 2 IVIG infusions and was discharged on prednisone treatment ( 130mg/day) which he has been taking regularly. He followed up with Dr Rondon this week who decreased his prednisone dose to 100 mg/day. Blood work done today showed platelets count of 12 so he was sent again to the ED. He denied fevers, chills, mucosal or any other source of bleeding, nausea or vomiting, melena, hematochezia, hematuria or other associated symptoms.    In the ED VS were normal.  He was started on IVIG infusion.    He was admitted for severe thrombocytopenia. He was given 2 sessions on IVIG and started on prednison 130 mg. He stable for discharge and will f/u heme/onc outpatient

## 2021-05-24 NOTE — PROGRESS NOTE ADULT - ATTENDING COMMENTS
pt was seen in clinic and was advised an admission in view of worsening platelets count   etiology is not entirely clear: iditopathic ITP vs moderna provoked ITP, vs lymphoproliferative disease vs collagen vasular disease induced vs MDS  -continue prednisone at 1mg/kg; tight sugar control; endocrinology f/u  - IV IG 1G/KG daily x2  - will follow the trend of platelets  - ct chest does not show significant adenopathy ; CT AP should be strongly considered   - consider bone marrow biopsy to r/o MDS given his demographics  - if poor improvement of plats, will consider stopping steroids and starting rituxan       seen/examined w/ hemonc fellow;note reviewed
Patient examined by myself at the bedside. Tolerated IVIG #1 , no abnormal bleeding . for # 2 today . continue os steroids. monitor glucose and K >
seen/examined w/ hemonc fellow; note reviewed; case discussed    d/c home and f/u in 2 days in my clinic  continue prednisone 1mg/kg daily  will discuss bone marrow bx as outpt
Patient examined , above note read . S/P IVIG X 2, platelets pending , no active bleeding .

## 2021-05-24 NOTE — DISCHARGE NOTE PROVIDER - NSDCCPCAREPLAN_GEN_ALL_CORE_FT
PRINCIPAL DISCHARGE DIAGNOSIS  Diagnosis: Immune thrombocytopenia  Assessment and Plan of Treatment: You were given 2 IVIG sessions and started on prednisone. You will see heme outpatient and are now stable for discharge

## 2021-05-24 NOTE — PROGRESS NOTE ADULT - SUBJECTIVE AND OBJECTIVE BOX
24H events:    Patient was recently admitted for thrombocytopenia due to suspected ITP. He responded well to steroids 1mg/kg and IVIG and Dc'ed home. His steroids were taped to 100mg, and then on his repeat CBC a few days later he had significantly dropped his platelet. Prednisone was then increased back to 130mg, but he dropped again so sent in to get IVIG, possible bone marrow and rituxan on Monday. He is currently getting IVIG in the ED.    PAST MEDICAL & SURGICAL HISTORY  HTN (hypertension)    High cholesterol      SOCIAL HISTORY:  Negative for smoking/alcohol/drug use.     ALLERGIES:  Allergy Status Unknown    MEDICATIONS:  STANDING MEDICATIONS    PRN MEDICATIONS    VITALS:   T(F): 98.5  HR: 74  BP: 113/69  RR: 18  SpO2: 97%    LABS:                        RADIOLOGY:    PHYSICAL EXAM:  GEN: No acute distress  HENT: NCAT, EOMI  LYMPH: No appreciable adenopathy  LUNGS: No respiratory distress, clear to auscultation bilaterally   HEART: regular rate and rhythm  ABD: Soft, non-tender, non-distended  SKIN: No rash  EXT: No edema  NEURO: AAOX3    
ELISHA BARGER 66y Male  MRN#: [id number]   CODE STATUS:________    SUBJECTIVE  Patient is a 66y old Male who presents with a chief complaint of thrombocytopenia (23 May 2021 10:53)  Currently admitted to medicine with the primary diagnosis of Immune thrombocytopenia    Today is hospital day 3d, and this morning he is resting comfortably and reports no overnight events.       OBJECTIVE  PAST MEDICAL & SURGICAL HISTORY  HTN (hypertension)    High cholesterol    Acute ITP      ALLERGIES:  Allergy Status Unknown    MEDICATIONS:  STANDING MEDICATIONS  amLODIPine   Tablet 5 milliGRAM(s) Oral daily  atorvastatin 40 milliGRAM(s) Oral at bedtime  chlorhexidine 4% Liquid 1 Application(s) Topical <User Schedule>  dextrose 40% Gel 15 Gram(s) Oral once  dextrose 5%. 1000 milliLiter(s) IV Continuous <Continuous>  dextrose 5%. 1000 milliLiter(s) IV Continuous <Continuous>  dextrose 50% Injectable 25 Gram(s) IV Push once  dextrose 50% Injectable 12.5 Gram(s) IV Push once  dextrose 50% Injectable 25 Gram(s) IV Push once  fluticasone propionate 50 MICROgram(s)/spray Nasal Spray 1 Spray(s) Both Nostrils two times a day  glucagon  Injectable 1 milliGRAM(s) IntraMuscular once  hydrochlorothiazide 25 milliGRAM(s) Oral daily  insulin glargine Injectable (LANTUS) 20 Unit(s) SubCutaneous at bedtime  insulin lispro (ADMELOG) corrective regimen sliding scale   SubCutaneous three times a day before meals  insulin lispro Injectable (ADMELOG) 7 Unit(s) SubCutaneous three times a day before meals  losartan 100 milliGRAM(s) Oral daily  pantoprazole    Tablet 40 milliGRAM(s) Oral before breakfast  predniSONE   Tablet 130 milliGRAM(s) Oral daily    PRN MEDICATIONS      VITAL SIGNS: Last 24 Hours  T(C): 36.4 (24 May 2021 08:00), Max: 37.1 (23 May 2021 16:28)  T(F): 97.5 (24 May 2021 08:00), Max: 98.7 (23 May 2021 16:28)  HR: 64 (24 May 2021 08:00) (56 - 64)  BP: 118/71 (24 May 2021 08:00) (112/57 - 118/71)  BP(mean): --  RR: 18 (24 May 2021 08:00) (18 - 18)  SpO2: --    LABS:                        11.9   11.17 )-----------( 51       ( 24 May 2021 05:55 )             38.5     05-24    133<L>  |  99  |  27<H>  ----------------------------<  104<H>  3.9   |  25  |  0.9    Ca    8.1<L>      24 May 2021 05:55    TPro  8.6<H>  /  Alb  3.2<L>  /  TBili  2.0<H>  /  DBili  x   /  AST  29  /  ALT  40  /  AlkPhos  45  05-24                  RADIOLOGY:    PHYSICAL EXAM:    GENERAL: NAD, well-developed, AAOx3  HEENT:  Atraumatic, Normocephalic.   PULMONARY: Clear to auscultation bilaterally; No wheeze  CARDIOVASCULAR: Regular rate and rhythm; No murmurs  GASTROINTESTINAL: Soft, Nontender, Nondistended; Bowel sounds present  MUSCULOSKELETAL: No clubbing, cyanosis, or edema  NEUROLOGY: non-focal  SKIN: No rashes or lesions      ASSESSMENT & PLAN  65 y/o male with PMH of DM, HTN, DLD, MAICOL, recent diagnosis of ITP sent from Dr Rondon's office for thrombocytopenia.    # ITP  - Platelets count 12 on o/p labs  - Recent admission for ITP (2 wks ago), s/p 2 IVIG infusions and d/c'd on 1mg/kg prednisone (good compliance)  > s/p IVIG 2 sessions  > C/w Prednisone 1 mg/kg  > Bone marrow biopsy Mon   > +/- Rituximab (if inadequate response to current tx)  > PPI  - Trend CBC  - Keep active T/S    # HTN  > c/w HCTZ, losartan and amlodipine    # DM  > c/w basal and nutritional insulin  > monitor BS    # DVT ppx: SCD  # PPI ppx: pantoprazole  # DIET: DASH, carb consistent  # ACTIVITY: IAT  # DISPO: From home, pending d/c once cleared by Heme/onc  
24H events:    His platelets improved to 51 with IVIG and steroids  No bleeding  He is requesting to go home  He is refusing bone marrow at this time      PAST MEDICAL & SURGICAL HISTORY  HTN (hypertension)    High cholesterol    Acute ITP      SOCIAL HISTORY:  Negative for smoking/alcohol/drug use.     ALLERGIES:  Allergy Status Unknown    MEDICATIONS:  STANDING MEDICATIONS  amLODIPine   Tablet 5 milliGRAM(s) Oral daily  atorvastatin 40 milliGRAM(s) Oral at bedtime  chlorhexidine 4% Liquid 1 Application(s) Topical <User Schedule>  dextrose 40% Gel 15 Gram(s) Oral once  dextrose 5%. 1000 milliLiter(s) IV Continuous <Continuous>  dextrose 5%. 1000 milliLiter(s) IV Continuous <Continuous>  dextrose 50% Injectable 25 Gram(s) IV Push once  dextrose 50% Injectable 12.5 Gram(s) IV Push once  dextrose 50% Injectable 25 Gram(s) IV Push once  fluticasone propionate 50 MICROgram(s)/spray Nasal Spray 1 Spray(s) Both Nostrils two times a day  glucagon  Injectable 1 milliGRAM(s) IntraMuscular once  hydrochlorothiazide 25 milliGRAM(s) Oral daily  insulin glargine Injectable (LANTUS) 20 Unit(s) SubCutaneous at bedtime  insulin lispro (ADMELOG) corrective regimen sliding scale   SubCutaneous three times a day before meals  insulin lispro Injectable (ADMELOG) 7 Unit(s) SubCutaneous three times a day before meals  losartan 100 milliGRAM(s) Oral daily  pantoprazole    Tablet 40 milliGRAM(s) Oral before breakfast  predniSONE   Tablet 130 milliGRAM(s) Oral daily    PRN MEDICATIONS    VITALS:   T(F): 97.5  HR: 64  BP: 118/71  RR: 18  SpO2: --    LABS:                        11.9   11.17 )-----------( 51       ( 24 May 2021 05:55 )             38.5     05-24    133<L>  |  99  |  27<H>  ----------------------------<  104<H>  3.9   |  25  |  0.9    Ca    8.1<L>      24 May 2021 05:55    TPro  8.6<H>  /  Alb  3.2<L>  /  TBili  2.0<H>  /  DBili  x   /  AST  29  /  ALT  40  /  AlkPhos  45  05-24                  RADIOLOGY:    PHYSICAL EXAM:  GEN: No acute distress  HENT: NCAT, EOMI  LYMPH: No appreciable adenopathy  LUNGS: No respiratory distress, clear to auscultation bilaterally   HEART: regular rate and rhythm  ABD: Soft, non-tender, non-distended  SKIN: No rash  EXT: No edema  NEURO: AAOX3    
Patient is a 66y old  Male who presents with a chief complaint of thrombocytopenia (22 May 2021 11:24)      Subjective: He feels well this morning.       Vital Signs Last 24 Hrs  T(C): 36.7 (23 May 2021 08:00), Max: 36.8 (22 May 2021 16:00)  T(F): 98.1 (23 May 2021 08:00), Max: 98.2 (22 May 2021 16:00)  HR: 67 (23 May 2021 08:00) (63 - 68)  BP: 105/65 (23 May 2021 08:00) (102/56 - 113/71)  BP(mean): --  RR: 18 (23 May 2021 08:00) (18 - 18)  SpO2: --    PHYSICAL EXAM  General: adult in NAD  HEENT: anicteric sclera, pink conjunctiva  CV: normal S1/S2 with no murmur rubs or gallops  Lungs: CTABL  Abdomen: soft non-tender non-distended  Ext: no edema  Skin: no petechiae  Neuro: alert and oriented X 4, no focal deficits    MEDICATIONS  (STANDING):  amLODIPine   Tablet 5 milliGRAM(s) Oral daily  atorvastatin 40 milliGRAM(s) Oral at bedtime  chlorhexidine 4% Liquid 1 Application(s) Topical <User Schedule>  dextrose 40% Gel 15 Gram(s) Oral once  dextrose 5%. 1000 milliLiter(s) (50 mL/Hr) IV Continuous <Continuous>  dextrose 5%. 1000 milliLiter(s) (100 mL/Hr) IV Continuous <Continuous>  dextrose 50% Injectable 25 Gram(s) IV Push once  dextrose 50% Injectable 12.5 Gram(s) IV Push once  dextrose 50% Injectable 25 Gram(s) IV Push once  fluticasone propionate 50 MICROgram(s)/spray Nasal Spray 1 Spray(s) Both Nostrils two times a day  glucagon  Injectable 1 milliGRAM(s) IntraMuscular once  hydrochlorothiazide 25 milliGRAM(s) Oral daily  insulin glargine Injectable (LANTUS) 20 Unit(s) SubCutaneous at bedtime  insulin lispro (ADMELOG) corrective regimen sliding scale   SubCutaneous three times a day before meals  insulin lispro Injectable (ADMELOG) 7 Unit(s) SubCutaneous three times a day before meals  losartan 100 milliGRAM(s) Oral daily  pantoprazole    Tablet 40 milliGRAM(s) Oral before breakfast  predniSONE   Tablet 130 milliGRAM(s) Oral daily    MEDICATIONS  (PRN):      LABS:                          12.7   10.83 )-----------( 14       ( 22 May 2021 07:52 )             41.6         Mean Cell Volume : 75.0 fL  Mean Cell Hemoglobin : 22.9 pg  Mean Cell Hemoglobin Concentration : 30.5 g/dL  Auto Neutrophil # : 8.96 K/uL  Auto Lymphocyte # : 1.27 K/uL  Auto Monocyte # : 0.44 K/uL  Auto Eosinophil # : 0.02 K/uL  Auto Basophil # : 0.02 K/uL  Auto Neutrophil % : 82.7 %  Auto Lymphocyte % : 11.7 %  Auto Monocyte % : 4.1 %  Auto Eosinophil % : 0.2 %  Auto Basophil % : 0.2 %      Serial CBC's  05-22 @ 07:52  Hct-41.6 / Hgb-12.7 / Plat-14 / RBC-5.55 / WBC-10.83      05-22    132<L>  |  95<L>  |  27<H>  ----------------------------<  144<H>  3.8   |  27  |  0.9    Ca    8.5      22 May 2021 07:52    TPro  8.2<H>  /  Alb  3.4<L>  /  TBili  0.8  /  DBili  x   /  AST  27  /  ALT  36  /  AlkPhos  53  05-22    BLOOD SMEAR INTERPRETATION:       RADIOLOGY & ADDITIONAL STUDIES:    
Patient is a 66y old  Male who presents with a chief complaint of thrombocytopenia (22 May 2021 09:38)      Subjective: He feels well today. He denies any bleeding episodes.       Vital Signs Last 24 Hrs  T(C): 36.5 (22 May 2021 08:00), Max: 36.9 (21 May 2021 15:30)  T(F): 97.7 (22 May 2021 08:00), Max: 98.5 (21 May 2021 15:30)  HR: 64 (22 May 2021 08:00) (60 - 74)  BP: 116/61 (22 May 2021 08:00) (100/52 - 124/55)  BP(mean): --  RR: 18 (22 May 2021 08:00) (18 - 18)  SpO2: --    PHYSICAL EXAM  General: adult in NAD  HEENT: PERRL  CV: normal S1/S2 with no murmur rubs or gallops  Lungs: CTABL  Abdomen: soft non-tender non-distended  Ext: no edema  Skin: No petechiae   Neuro: alert and oriented X 4, no focal deficits    MEDICATIONS  (STANDING):  amLODIPine   Tablet 5 milliGRAM(s) Oral daily  atorvastatin 40 milliGRAM(s) Oral at bedtime  chlorhexidine 4% Liquid 1 Application(s) Topical <User Schedule>  dextrose 40% Gel 15 Gram(s) Oral once  dextrose 5%. 1000 milliLiter(s) (50 mL/Hr) IV Continuous <Continuous>  dextrose 5%. 1000 milliLiter(s) (100 mL/Hr) IV Continuous <Continuous>  dextrose 50% Injectable 25 Gram(s) IV Push once  dextrose 50% Injectable 12.5 Gram(s) IV Push once  dextrose 50% Injectable 25 Gram(s) IV Push once  fluticasone propionate 50 MICROgram(s)/spray Nasal Spray 1 Spray(s) Both Nostrils two times a day  glucagon  Injectable 1 milliGRAM(s) IntraMuscular once  hydrochlorothiazide 25 milliGRAM(s) Oral daily  immune   globulin 10% (GAMMAGARD) IVPB 130 Gram(s) IV Intermittent Once  insulin glargine Injectable (LANTUS) 20 Unit(s) SubCutaneous at bedtime  insulin lispro (ADMELOG) corrective regimen sliding scale   SubCutaneous three times a day before meals  insulin lispro Injectable (ADMELOG) 7 Unit(s) SubCutaneous three times a day before meals  losartan 100 milliGRAM(s) Oral daily  pantoprazole    Tablet 40 milliGRAM(s) Oral before breakfast  predniSONE   Tablet 130 milliGRAM(s) Oral daily    MEDICATIONS  (PRN):      LABS:                          12.7   10.83 )-----------( 14       ( 22 May 2021 07:52 )             41.6         Mean Cell Volume : 75.0 fL  Mean Cell Hemoglobin : 22.9 pg  Mean Cell Hemoglobin Concentration : 30.5 g/dL  Auto Neutrophil # : 8.96 K/uL  Auto Lymphocyte # : 1.27 K/uL  Auto Monocyte # : 0.44 K/uL  Auto Eosinophil # : 0.02 K/uL  Auto Basophil # : 0.02 K/uL  Auto Neutrophil % : 82.7 %  Auto Lymphocyte % : 11.7 %  Auto Monocyte % : 4.1 %  Auto Eosinophil % : 0.2 %  Auto Basophil % : 0.2 %      Serial CBC's  05-22 @ 07:52  Hct-41.6 / Hgb-12.7 / Plat-14 / RBC-5.55 / WBC-10.83      05-22    132<L>  |  95<L>  |  27<H>  ----------------------------<  144<H>  3.8   |  27  |  0.9    Ca    8.5      22 May 2021 07:52    TPro  8.2<H>  /  Alb  3.4<L>  /  TBili  0.8  /  DBili  x   /  AST  27  /  ALT  36  /  AlkPhos  53  05-22                                  BLOOD SMEAR INTERPRETATION:       RADIOLOGY & ADDITIONAL STUDIES:    
Today is hospital day 1d.     INTERVAL HPI / OVERNIGHT EVENTS  Patient was examined and seen at bedside.   No OVNE      PAST MEDICAL & SURGICAL HISTORY  HTN (hypertension)    High cholesterol    Acute ITP      ALLERGIES  Allergy Status Unknown    MEDICATIONS  STANDING MEDICATIONS  amLODIPine   Tablet 5 milliGRAM(s) Oral daily  atorvastatin 40 milliGRAM(s) Oral at bedtime  chlorhexidine 4% Liquid 1 Application(s) Topical <User Schedule>  dextrose 40% Gel 15 Gram(s) Oral once  dextrose 5%. 1000 milliLiter(s) IV Continuous <Continuous>  dextrose 5%. 1000 milliLiter(s) IV Continuous <Continuous>  dextrose 50% Injectable 25 Gram(s) IV Push once  dextrose 50% Injectable 12.5 Gram(s) IV Push once  dextrose 50% Injectable 25 Gram(s) IV Push once  fluticasone propionate 50 MICROgram(s)/spray Nasal Spray 1 Spray(s) Both Nostrils two times a day  glucagon  Injectable 1 milliGRAM(s) IntraMuscular once  hydrochlorothiazide 25 milliGRAM(s) Oral daily  insulin glargine Injectable (LANTUS) 20 Unit(s) SubCutaneous at bedtime  insulin lispro (ADMELOG) corrective regimen sliding scale   SubCutaneous three times a day before meals  insulin lispro Injectable (ADMELOG) 7 Unit(s) SubCutaneous three times a day before meals  losartan 100 milliGRAM(s) Oral daily  pantoprazole    Tablet 40 milliGRAM(s) Oral before breakfast  predniSONE   Tablet 130 milliGRAM(s) Oral daily    PRN MEDICATIONS    VITALS:  T(F): 97.7  HR: 64  BP: 116/61  RR: 18  SpO2: 97%    PHYSICAL EXAM  GEN: NAD, Resting comfortably in bed  PULM: Clear to auscultation bilaterally, No wheezes/rales/rhonchi  CVS: Regular rate and rhythm, S1-S2, no murmurs/rubs/gallops, +2 pulses  ABD: Soft, non-tender, non-distended, no guarding  EXT: No edema  NEURO: A&Ox3, no focal deficits    LABS                        12.7   10.83 )-----------( x        ( 22 May 2021 07:52 )             41.6

## 2021-05-24 NOTE — DISCHARGE NOTE PROVIDER - NSDCMRMEDTOKEN_GEN_ALL_CORE_FT
amLODIPine 5 mg oral tablet: 1 tab(s) orally once a day  aspirin 81 mg oral tablet, chewable: 1 tab(s) orally once a day  atorvastatin 40 mg oral tablet: 1 tab(s) orally once a day  Flonase 50 mcg/inh nasal spray: 1 spray(s) nasal once a day  hydroCHLOROthiazide 25 mg oral tablet: 1 tab(s) orally once a day  Lantus Solostar Pen 100 units/mL subcutaneous solution: 10 unit(s) subcutaneous once a day (at bedtime). SKIP DOSE IF BEDTIME SUGAR LESS THAN 140.   losartan 100 mg oral tablet: 1 tab(s) orally once a day  metFORMIN 1000 mg oral tablet: 1 tab(s) orally 2 times a day  pantoprazole 40 mg oral delayed release tablet: 1 tab(s) orally once a day (before a meal)  predniSONE 10 mg oral tablet: 13 tab(s) orally once a day

## 2021-05-24 NOTE — PROGRESS NOTE ADULT - ASSESSMENT
65 y/o male with PMH of DM, HTN, DLD, MAICOL with recent admission for ITP and currently on treatment, being sent back for admission due to refractory ITP.     ITP: Patient was recently admitted for thrombocytopenia due to suspected ITP. He responded well to steroids 1mg/kg and IVIG and Dc'ed home. His steroids were taped to 100mg, and then on his repeat CBC a few days later he had significantly dropped his platelet. Prednisone was then increased back to 130mg, but he dropped again so sent in to get IVIG, possible bone marrow and rituxan on Monday. He did have thrombocytopenia as far back as June 2020 so possibly   - No clumps, schistocytes or blasts seen on smear on initial presentation   - HORACIO 1:160; Flow cytometry unremarkable   - HIV HepB and C, EBV/CMV IgM negative, no splenomegaly  - Trend CBC/ platelet count   - If inadequate response may give Rituxan   - Cont with PPI as he is on high dose steroids   - IVIG 1g/kg for 2 days. Dose 2 received 5/22. Awaiting for repeat platelet count for today   - Patient electing to forego bone marrow at this time, will likely setup as an outpatient  - Prednisone 130mg (1mg/kg) daily, he will continue on this and followup Wednesday in the clinic    DM:  - Basal bolus correction while in hospital, he is following with endocrine for management as an outpatient  - He will be on high dose steroids    HTN/DLD  - C/w home meds    DVT ppx- SCD as pt has low platelet count     PLAN:  - Continue 1mg/kg prednisone QD (130mg QD)  - Followup outpatient Wednesday  - Will discuss bone marrow and rituxan as an outpatient  - OK to DC home today    
65 y/o male with PMH of DM, HTN, DLD, MAICOL with recent admission for ITP and currently on treatment, being sent back for admission due to refractory ITP.     ITP: Patient was recently admitted for thrombocytopenia due to suspected ITP. He responded well to steroids 1mg/kg and IVIG and Dc'ed home. His steroids were taped to 100mg, and then on his repeat CBC a few days later he had significantly dropped his platelet. Prednisone was then increased back to 130mg, but he dropped again so sent in to get IVIG, possible bone marrow and rituxan on Monday. He did have thrombocytopenia as far back as June 2020 so possibly   - No clumps, schistocytes or blasts seen on smear on initial presentation   - HORACIO 1:160; Flow cytometry unremarkable   - HIV HepB and C, EBV/CMV IgM negative, no splenomegaly  - Trend CBC/ platelet count   - Will likely perform bone marrow on Monday though patient is reluctant in regards to biopsy, he will discuss with primary attending on Monday   - If inadequate response may give Rituxan   - Cont with PPI as he is on high dose steroids   - Prednisone 130mg (1mg/kg) daily  - IVIG 1g/kg for 2 days. Dose 2 to be received today. Please note should be infused slowly. Yesterday dose was infused over 2 hours.     DM:  - Basal bolus correction  - He will be on high dose steroids    HTN/DLD  - C/w home meds    No need for platelet transfusion unless patient is actively bleeding     DVT ppx- SCD as pt has low platelet count   
67 y/o male with PMH of DM, HTN, DLD, MAICOL with recent admission for ITP and currently on treatment, being sent back for admission due to refractory ITP.     ITP: Patient was recently admitted for thrombocytopenia due to suspected ITP. He responded well to steroids 1mg/kg and IVIG and Dc'ed home. His steroids were taped to 100mg, and then on his repeat CBC a few days later he had significantly dropped his platelet. Prednisone was then increased back to 130mg, but he dropped again so sent in to get IVIG, possible bone marrow and rituxan on Monday. He did have thrombocytopenia as far back as June 2020 so possibly   - No clumps, schistocytes or blasts seen on smear on initial presentation   - HORACIO 1:160; Flow cytometry unremarkable   - HIV HepB and C, EBV/CMV IgM negative, no splenomegaly  - Trend CBC/ platelet count   - Will likely perform bone marrow on Monday though patient is reluctant in regards to biopsy, he will discuss with primary attending on Monday   - If inadequate response may give Rituxan   - Cont with PPI as he is on high dose steroids   - Prednisone 130mg (1mg/kg) daily  - IVIG 1g/kg for 2 days. Dose 2 received 5/22. Awaiting for repeat platelet count for today     DM:  - Basal bolus correction  - He will be on high dose steroids    HTN/DLD  - C/w home meds    No need for platelet transfusion unless patient is actively bleeding     DVT ppx- SCD as pt has low platelet count   
67 y/o male with PMH of DM, HTN, DLD, MAICOL, recent diagnosis of ITP sent from Dr Rondon's office for thrombocytopenia.    # ITP  - Platelets count 12 on o/p labs  - Recent admission for ITP (2 wks ago), s/p 2 IVIG infusions and d/c'd on 1mg/kg prednisone (good compliance)  - This admission: IVIG x1  > C/w IVIG (1 more day)  > C/w Prednisone 1 mg/kg  > Bone marrow biopsy Mon   > +/- Rituximab (if inadequate response to current tx)  > PPI    # HTN  > c/w HCTZ, losartan and amlodipine    # DM  > c/w basal and nutritional insulin  > monitor BS    # DVT ppx: SCD  # PPI ppx: pantoprazole  # DIET: DASH, carb consistent  # ACTIVITY: IAT  # DISPO: From home
65 y/o male with PMH of DM, HTN, DLD, MAICOL with recent admission for ITP and currently on treatment, being sent back for admission due to refractory ITP.     ITP: Patient was recently admitted for thrombocytopenia due to suspected ITP. He responded well to steroids 1mg/kg and IVIG and Dc'ed home. His steroids were taped to 100mg, and then on his repeat CBC a few days later he had significantly dropped his platelet. Prednisone was then increased back to 130mg, but he dropped again so sent in to get IVIG, possible bone marrow and rituxan on Monday. He did have thrombocytopenia as far back as June 2020 so possibly   - No clumps, schistocytes or blasts on smear  - HORACIO 1:160  - HIV HepB and C, EBV/CMV IgM negative   - No splenomegaly  - Trend CBC  - Will likely perform bone marrow on Monday  - If inadequate response may give rituxan  - PPI  - Prednisone 130mg (1mg/kg) daily  - IVIG 1g/kg for 2 days    DM:  - Basal bolus correction  - He will be on high dose steroids    HTN/DLD  - C/w home meds        No need for platelet transfusion unless patient is actively bleeding .       f/u  HORACIO, RF  f/u EBV, CMV, Parvovirus   f.u peripheral flow cytometry     5.9.21: d3 of  prednisone  1mg/kg- 130 mg PO   maintain on  protonix 40  mg qd to prevent stress PUD  He received 2 doses  IVIG 1g/kg (5/7/21 AND 5/8/21  His plt counts have improved to 38K. So ok to DC him home with a very close f/u : appt w/ myself on 5.11.21  Please DC him on prednisone 130 mg and Protonix 40 mg.     He will be scheduled for f/u appt with Dr Rondon on Tuesday 5/11/21 with CBC check.   NOTE: ***He needs a VERY TIGHT blood sugar control as he will be on steroids for a few weeks. He was asked to f/u with PMD closely as well for blood sugar management as well.       DVT ppx- SCD as pt has low plt  Discussed plan with primary team

## 2021-05-26 ENCOUNTER — LABORATORY RESULT (OUTPATIENT)
Age: 66
End: 2021-05-26

## 2021-05-26 ENCOUNTER — APPOINTMENT (OUTPATIENT)
Dept: HEMATOLOGY ONCOLOGY | Facility: CLINIC | Age: 66
End: 2021-05-26
Payer: MEDICARE

## 2021-05-26 VITALS
DIASTOLIC BLOOD PRESSURE: 69 MMHG | SYSTOLIC BLOOD PRESSURE: 116 MMHG | WEIGHT: 284 LBS | HEIGHT: 66 IN | TEMPERATURE: 98.6 F | HEART RATE: 80 BPM | RESPIRATION RATE: 16 BRPM | BODY MASS INDEX: 45.64 KG/M2

## 2021-05-26 PROCEDURE — 99215 OFFICE O/P EST HI 40 MIN: CPT

## 2021-05-26 NOTE — PHYSICAL EXAM
[Restricted in physically strenuous activity but ambulatory and able to carry out work of a light or sedentary nature] : Status 1- Restricted in physically strenuous activity but ambulatory and able to carry out work of a light or sedentary nature, e.g., light house work, office work [Obese] : obese [Normal] : affect appropriate [de-identified] : no bleed  [de-identified] : no mucocutaneous bleed

## 2021-05-26 NOTE — ASSESSMENT
[FreeTextEntry1] : 67 yo with thrombocytopenia; 5K noted on 5..7.21. Review of the trend revelaed that pt has isolated thrombocytopenia of unclear etiology, that has started several months ago, exacerbated in winter 2021 (counts have decreased from 100K/ June 2020 to 50K/ January 2021 to 5k/ May 2021). Hamlet completed both doses of  MODERNA several weeks prior to the hospital presentation. \par \par Peripheral smear did not note schistocytes, spherocytes, aacnthocytes, polychromasia; no dysplastic immauture cells; labs did not reveal evidence of hemolysis\par \par Differential diagnosis includes;\par - idiopathic ITP\par - modernal caused ITP\par - NHL caused ITP\par - CVD associated ITP (ANA1:360)\par - MDS\par \par \par \par started on IV IG 1G/kg daily x2 (5/7/21-5/8/21) and on prednisone 1mg/kg beginning 5/7/21. He had a good response to IVIG 1 G/KG X2 and prednisone 1mg/kg with normalization of platelets count; upon slow tapering of steroids, platelets count nadired to 12K: he was re-challenged with iVIG 1G/KG x2 and prednisone dose was increased back to 1mg/kg: the effect is humble since platelets count has not normalized but decreased to 30sK\par \par \par \par Platelets counts improved to 116K (5/11/21) and \par 133K (5/14/21), when the dose of prednisone was decreased from 130mg to 100mg daily: \par 5/18/21: 28K , and dose of prednisone was increased back up to 130mg daily\par 5/21/21: 12K (started IVIG x 2 days and continued prednisone 130mg daily)\par 5/24/21: 51K\par 5/26/21: 35K\par \par HORACIO +\par \par IMPRESSION; REFRACTORY ITP\par PLAN:\par -pt appears distressed about his condition and is afraid it is life threatening; reassured the patient that we will keep a close look on him;\par - concerned if this is refractory disease vs effect of IVIG rather than prednisone; his current dose to 130mg daily\par -RECOMMEND TO CHANGE THE TREATMENT PLAN; in addition to continuing steroids, plan to add second line therapy with CD20 monoclonal antibodiy Rituxan weekly x4\par - GIVEN the age of onset of isolated thrombocytopenia, need to r/o MDS and need to arrange IR guided bone marrow bx: in view of his anatomy ,this would be easier to assess by IR approach\par - CT CAP IVC to r/o NHL\par - rheum eval in view of +HORACIO\par \par \par - emotional support given; case discussed within oncology group\par - expressed concern for refractory thrombocytopenia since his PLTs are dropping despite high dose steroid (1mg/kg dosing); we had a conversation regarding role of IVIG and possibly Rituximab.  \par - s/p IVIG 1G/KG daily x 2 as of 5/22/21\par - c/w prednisone 130mg daily\par - he is aware of role of BMBx and possibly starting Rituxan if his counts do not recover as he may be refractory to current treatment; Discussed risks and benefits of Rituxan including side effects such as fatigue, diarrhea, rashes, hepatitis B reactivation, allergic reactions and death to name a few. Consent obtained.  \par \par \par RTC on 5.28.21 with repeat CBC for possible Rituxan\par everything mentioned above was discussed and explianed thoroughtly to the patient; he still appears apprehensive; will follow closely

## 2021-05-26 NOTE — CONSULT LETTER
[Dear  ___] : Dear  [unfilled], [Consult Letter:] : I had the pleasure of evaluating your patient, [unfilled]. [Please see my note below.] : Please see my note below. [Sincerely,] : Sincerely, [FreeTextEntry3] : Elvis Rondon DO\par Attending Physician,\par Hematology/ Medical Oncology\par [phone] office\par \par

## 2021-05-27 ENCOUNTER — APPOINTMENT (OUTPATIENT)
Dept: HEMATOLOGY ONCOLOGY | Facility: CLINIC | Age: 66
End: 2021-05-27

## 2021-05-27 ENCOUNTER — NON-APPOINTMENT (OUTPATIENT)
Age: 66
End: 2021-05-27

## 2021-05-27 LAB
APTT BLD: 25.6 SEC
HBV SURFACE AB SER QL: REACTIVE
HCT VFR BLD CALC: 40.6 %
HGB BLD-MCNC: 13 G/DL
INR PPP: 1.05 RATIO
MCHC RBC-ENTMCNC: 23.6 PG
MCHC RBC-ENTMCNC: 32 G/DL
MCV RBC AUTO: 73.7 FL
PLATELET # BLD AUTO: 35 K/UL
PMV BLD: NORMAL
PT BLD: 12.1 SEC
RBC # BLD: 5.51 M/UL
RBC # FLD: 18.6 %
WBC # FLD AUTO: 12.01 K/UL

## 2021-05-27 RX ORDER — ONDANSETRON 8 MG/1
8 TABLET ORAL EVERY 8 HOURS
Qty: 45 | Refills: 3 | Status: ACTIVE | COMMUNITY
Start: 2021-05-27 | End: 1900-01-01

## 2021-05-28 ENCOUNTER — NON-APPOINTMENT (OUTPATIENT)
Age: 66
End: 2021-05-28

## 2021-05-28 ENCOUNTER — LABORATORY RESULT (OUTPATIENT)
Age: 66
End: 2021-05-28

## 2021-05-28 ENCOUNTER — APPOINTMENT (OUTPATIENT)
Dept: HEMATOLOGY ONCOLOGY | Facility: CLINIC | Age: 66
End: 2021-05-28
Payer: MEDICARE

## 2021-05-28 ENCOUNTER — APPOINTMENT (OUTPATIENT)
Dept: INFUSION THERAPY | Facility: CLINIC | Age: 66
End: 2021-05-28
Payer: MEDICARE

## 2021-05-28 VITALS
HEIGHT: 66 IN | TEMPERATURE: 97.1 F | RESPIRATION RATE: 16 BRPM | BODY MASS INDEX: 45.32 KG/M2 | HEART RATE: 78 BPM | DIASTOLIC BLOOD PRESSURE: 67 MMHG | WEIGHT: 282 LBS | SYSTOLIC BLOOD PRESSURE: 118 MMHG

## 2021-05-28 PROCEDURE — 99214 OFFICE O/P EST MOD 30 MIN: CPT

## 2021-05-28 RX ORDER — ACETAMINOPHEN 500 MG
650 TABLET ORAL ONCE
Refills: 0 | Status: COMPLETED | OUTPATIENT
Start: 2021-05-28 | End: 2021-05-28

## 2021-05-28 RX ORDER — FAMOTIDINE 10 MG/ML
20 INJECTION INTRAVENOUS ONCE
Refills: 0 | Status: COMPLETED | OUTPATIENT
Start: 2021-05-28 | End: 2021-05-28

## 2021-05-28 RX ORDER — RITUXIMAB 10 MG/ML
865 INJECTION, SOLUTION INTRAVENOUS ONCE
Refills: 0 | Status: COMPLETED | OUTPATIENT
Start: 2021-05-28 | End: 2021-05-28

## 2021-05-28 RX ORDER — DEXAMETHASONE 0.5 MG/5ML
4 ELIXIR ORAL ONCE
Refills: 0 | Status: COMPLETED | OUTPATIENT
Start: 2021-05-28 | End: 2021-05-28

## 2021-05-28 RX ORDER — DIPHENHYDRAMINE HCL 50 MG
50 CAPSULE ORAL ONCE
Refills: 0 | Status: COMPLETED | OUTPATIENT
Start: 2021-05-28 | End: 2021-05-28

## 2021-05-28 RX ADMIN — RITUXIMAB 173 MILLIGRAM(S): 10 INJECTION, SOLUTION INTRAVENOUS at 11:35

## 2021-05-28 RX ADMIN — Medication 4 MILLIGRAM(S): at 10:10

## 2021-05-28 RX ADMIN — Medication 650 MILLIGRAM(S): at 10:50

## 2021-05-28 RX ADMIN — FAMOTIDINE 20 MILLIGRAM(S): 10 INJECTION INTRAVENOUS at 11:30

## 2021-05-28 RX ADMIN — FAMOTIDINE 104 MILLIGRAM(S): 10 INJECTION INTRAVENOUS at 11:10

## 2021-05-28 RX ADMIN — Medication 50 MILLIGRAM(S): at 11:50

## 2021-05-28 RX ADMIN — Medication 650 MILLIGRAM(S): at 10:49

## 2021-05-28 RX ADMIN — RITUXIMAB 865 MILLIGRAM(S): 10 INJECTION, SOLUTION INTRAVENOUS at 15:45

## 2021-05-28 RX ADMIN — Medication 102 MILLIGRAM(S): at 10:50

## 2021-05-28 RX ADMIN — Medication 102 MILLIGRAM(S): at 11:30

## 2021-05-28 NOTE — PHYSICAL EXAM
[Restricted in physically strenuous activity but ambulatory and able to carry out work of a light or sedentary nature] : Status 1- Restricted in physically strenuous activity but ambulatory and able to carry out work of a light or sedentary nature, e.g., light house work, office work [Obese] : obese [Normal] : affect appropriate [de-identified] : no bleed  [de-identified] : no mucocutaneous bleed

## 2021-05-28 NOTE — REVIEW OF SYSTEMS
[Negative] : Allergic/Immunologic [Fever] : no fever [Chills] : no chills [Night Sweats] : no night sweats [Fatigue] : no fatigue [Recent Change In Weight] : ~T no recent weight change [Vision Problems] : no vision problems [Nosebleeds] : no nosebleeds [Hoarseness] : no hoarseness [Leg Claudication] : no intermittent leg claudication [Palpitations] : no palpitations [Lower Ext Edema] : no lower extremity edema [Shortness Of Breath] : no shortness of breath [Wheezing] : no wheezing [Cough] : no cough [SOB on Exertion] : no shortness of breath during exertion [Vomiting] : no vomiting [Diarrhea] : no diarrhea [Joint Pain] : no joint pain [Joint Stiffness] : no joint stiffness [Muscle Pain] : no muscle pain

## 2021-05-28 NOTE — ASSESSMENT
[FreeTextEntry1] : 67 yo with thrombocytopenia; 5K noted on 5..7.21. Review of the trend revelaed that pt has isolated thrombocytopenia of unclear etiology, that has started several months ago, exacerbated in winter 2021 (counts have decreased from 100K/ June 2020 to 50K/ January 2021 to 5k/ May 2021). Hamlet completed both doses of  MODERNA several weeks prior to the hospital presentation. \par \par Peripheral smear did not note schistocytes, spherocytes, aacnthocytes, polychromasia; no dysplastic immauture cells; labs did not reveal evidence of hemolysis\par \par Differential diagnosis includes;\par - idiopathic ITP\par - modernal caused ITP\par - NHL caused ITP\par - CVD associated ITP (ANA1:360)\par - MDS\par \par started on IV IG 1G/kg daily x2 (5/7/21-5/8/21) and on prednisone 1mg/kg beginning 5/7/21. He had a good response to IVIG 1 G/KG X2 and prednisone 1mg/kg with normalization of platelets count; upon slow tapering of steroids, platelets count nadired to 12K: he was re-challenged with iVIG 1G/KG x2 and prednisone dose was increased back to 1mg/kg: the effect is humble since platelets count has not normalized but decreased to 30sK\par \par Platelets counts improved to 116K (5/11/21) and \par 133K (5/14/21), when the dose of prednisone was decreased from 130mg to 100mg daily: \par 5/18/21: 28K , and dose of prednisone was increased back up to 130mg daily\par 5/21/21: 12K (started IVIG x 2 days and continued prednisone 130mg daily)\par 5/24/21: 51K\par 5/26/21: 35K\par 5/28/21: 16K\par \par HORACIO +\par \par IMPRESSION; REFRACTORY ITP\par PLAN:\par - pt appears distressed about his condition and is afraid it is life threatening; reassured the patient that we will keep a close look on him\par - concerned if this is refractory disease ; RECOMMEND TO CHANGE THE TREATMENT PLAN; in addition to continuing steroids (prednisone 1mg/kg daily) , plan to add second line therapy with CD20 monoclonal antibody Rituxan weekly x4: first weekly dose 5.28.21 outpatient\par - GIVEN the age of onset of isolated thrombocytopenia, need to r/o MDS and need to arrange IR guided bone marrow bx: in view of his anatomy ,this would be easier to assess by IR approach\par - CT CAP IVC to r/o NHL, pending scheduling\par - rheum eval in view of +HORACIO\par \par - emotional support given; case discussed within oncology group\par - expressed concern for refractory thrombocytopenia since his PLTs are dropping despite high dose steroid (1mg/kg dosing); we had a conversation regarding role of IVIG and possibly Rituximab.  \par - s/p IVIG 1G/KG daily x 2 as of 5/22/21\par - c/w prednisone 130mg daily, rx renewed\par - he is aware of role of BMBx, pending scheduling\par - Start Rituxan x once weekly x4 weeks today 5.28.2021; Discussed risks and benefits of Rituxan including side effects such as fatigue, diarrhea, rashes, hepatitis B reactivation, allergic reactions and death to name a few. Consent obtained.  \par - LDH, CBC, LFTs, Haptoglobin, retic count, emelia today\par \par RTC on June 1st, for f/up visit with CBC + peripheral smear\par advised if any subtle signs of CVA / mucocutaneous bleed, immediately call 911 and get to ER

## 2021-05-28 NOTE — HISTORY OF PRESENT ILLNESS
[Therapy: ___] : Therapy: [unfilled] [Cycle: ___] : Cycle: [unfilled] [de-identified] : Mr. Dalal is a 66 year old male with PMH of DM, HTN, DLD, MAICOL who was recently admitted for suspected ITP. Patient was noted to have asymptomatic thrombocytopenia as an outpatient and referred to the ED. In the ED his platelet count was 5K with normal WBC and Hg. His smear was reviewed and showed 1-2 schistocytes, no clumping, large platelets, decrease number of platelets, normal WBC morphology/no blasts, some target cells. He was started on prednisone 1 mg/kg of prednisone, and he received two doses of IVIG. His platelets improved to 38 and he was discharged on prednisone. \par Of note, his platelets were 106 in June 2020, ad then 53 in January 2021. Also in January and then in February he received the Moderna vaccine. He does have high RBC count (6.45) and low MCV (73).\par Workup in the hospital showed normal US spleen, no evidence of hemolysis, normal LDH, hapto and ret count, HIV, HepB, HepC EBV IgM, CMV IgM NR , HIT negative , RF negative, peripheral flow negative\par \par Patient informed us he was not discharged with needles for his insulin. His fingersticks have in the low 200s. \par His platelets today are 116. No bleeding, bruising or petechiae. \par \par 5/14/2021- pt returns for follow up today.  feeling well.  some mild bruising where labs drawn otherwise no other bruising.  wbc 10.49 hgb 13.5 hct 23.0 platelets now 133. \par pt made f/up appt with new fer Doss for this monday 5/17/2021. P t checking FS at home sugar 130 today.   [FreeTextEntry1] : Started weekly Ritxuan on 5.28.2021 [de-identified] : 5/18/21: Mr. Dalal returns for followup. Since Saturday he has been taking 100mg prednsone daily, he was on 130mg prior to this. No evidence of bleeding, petechiae. His platelets dropped to 28K. We reviewed the smear and there was no clumping, 2-3 platelets per HPF. We told him to take an additional 30mg today.\par \par 5/21/21\par Patient is here for a follow-up visit for immune thrombocytopenia.  He is feeling well with no new complaints.  Reviewed most recent CBC, which shows PLTs are down to 12,000 from 28,000 earlier this week.  His PLT dropped even with increase of prednisone back up to 130mg daily.  Patient denies easier bruising or bleeding.  \par \par 5/26/21\par Patient is here for a follow-up visit for immune thrombocytopenia.  He is feeling well with no new complaints.  Patient denies easier bruising or bleeding.  Since last visit, he was sent to ER for complete workup and received IVIG x 2 days.  During hospital stay, we offered BMBx and to start Rituxan, but patient deferred at that time.  He has been taking Prednisone 130mg daily.  Reviewed most recent CBC which shows PLTs are down to 35,000 from 51,000 earlier this week.  \par \par 5/28/2021- Pt  is here for a follow-up visit for immune thrombocytopenia.  He is feeling well with no new complaints.  Patient denies easier bruising or bleeding.  No Headache no diziness no weakness.   pt was continued on prednisone.  Since last visit, his platelets went from  35,000 to 15,000\par ITP  refractory to current treatment; Discussed risks and benefits of Rituxan including side effects such as fatigue, diarrhea, rashes, hepatitis B reactivation, allergic reactions and death to name a few. Consent obtained.

## 2021-06-01 ENCOUNTER — LABORATORY RESULT (OUTPATIENT)
Age: 66
End: 2021-06-01

## 2021-06-01 ENCOUNTER — APPOINTMENT (OUTPATIENT)
Dept: HEMATOLOGY ONCOLOGY | Facility: CLINIC | Age: 66
End: 2021-06-01
Payer: MEDICARE

## 2021-06-01 ENCOUNTER — APPOINTMENT (OUTPATIENT)
Dept: INFUSION THERAPY | Facility: CLINIC | Age: 66
End: 2021-06-01

## 2021-06-01 VITALS
HEART RATE: 80 BPM | WEIGHT: 283 LBS | TEMPERATURE: 97.6 F | HEIGHT: 66 IN | SYSTOLIC BLOOD PRESSURE: 118 MMHG | RESPIRATION RATE: 14 BRPM | BODY MASS INDEX: 45.48 KG/M2 | DIASTOLIC BLOOD PRESSURE: 68 MMHG

## 2021-06-01 DIAGNOSIS — E11.9 TYPE 2 DIABETES MELLITUS WITHOUT COMPLICATIONS: ICD-10-CM

## 2021-06-01 DIAGNOSIS — I10 ESSENTIAL (PRIMARY) HYPERTENSION: ICD-10-CM

## 2021-06-01 DIAGNOSIS — Z79.82 LONG TERM (CURRENT) USE OF ASPIRIN: ICD-10-CM

## 2021-06-01 DIAGNOSIS — E78.5 HYPERLIPIDEMIA, UNSPECIFIED: ICD-10-CM

## 2021-06-01 DIAGNOSIS — Z79.52 LONG TERM (CURRENT) USE OF SYSTEMIC STEROIDS: ICD-10-CM

## 2021-06-01 DIAGNOSIS — Z79.4 LONG TERM (CURRENT) USE OF INSULIN: ICD-10-CM

## 2021-06-01 DIAGNOSIS — D69.3 IMMUNE THROMBOCYTOPENIC PURPURA: ICD-10-CM

## 2021-06-01 LAB
ALBUMIN SERPL ELPH-MCNC: 3.3 G/DL
ALP BLD-CCNC: 50 U/L
ALT SERPL-CCNC: 38 U/L
AST SERPL-CCNC: 24 U/L
BILIRUB DIRECT SERPL-MCNC: 0.3 MG/DL
BILIRUB INDIRECT SERPL-MCNC: 1.1 MG/DL
BILIRUB SERPL-MCNC: 1.4 MG/DL
DIRECT COOMBS: ABNORMAL
HAPTOGLOB SERPL-MCNC: 152 MG/DL
HCT VFR BLD CALC: 40.4 %
HCT VFR BLD CALC: 40.7 %
HGB BLD-MCNC: 13 G/DL
HGB BLD-MCNC: 13 G/DL
LDH SERPL-CCNC: 192 U/L
MCHC RBC-ENTMCNC: 23.7 PG
MCHC RBC-ENTMCNC: 24 PG
MCHC RBC-ENTMCNC: 31.9 G/DL
MCHC RBC-ENTMCNC: 32.2 G/DL
MCV RBC AUTO: 73.6 FL
MCV RBC AUTO: 75.2 FL
PLATELET # BLD AUTO: 16 K/UL
PLATELET # BLD AUTO: 7 K/UL
PMV BLD: NORMAL
PMV BLD: NORMAL
PROT SERPL-MCNC: 8.1 G/DL
RBC # BLD: 5.41 M/UL
RBC # BLD: 5.49 M/UL
RBC # FLD: 19 %
RBC # FLD: 20 %
RETICS # AUTO: 2.7 %
RETICS AGGREG/RBC NFR: 139.1 K/UL
WBC # FLD AUTO: 11.29 K/UL
WBC # FLD AUTO: 12.8 K/UL

## 2021-06-01 PROCEDURE — 99215 OFFICE O/P EST HI 40 MIN: CPT

## 2021-06-01 RX ORDER — ROMIPLOSTIM 250 UG/.5ML
260 INJECTION, POWDER, LYOPHILIZED, FOR SOLUTION SUBCUTANEOUS ONCE
Refills: 0 | Status: COMPLETED | OUTPATIENT
Start: 2021-06-01 | End: 2021-06-01

## 2021-06-01 RX ADMIN — ROMIPLOSTIM 260 MICROGRAM(S): 250 INJECTION, POWDER, LYOPHILIZED, FOR SOLUTION SUBCUTANEOUS at 15:53

## 2021-06-01 NOTE — REVIEW OF SYSTEMS
[Negative] : Allergic/Immunologic [Fever] : no fever [Chills] : no chills [Night Sweats] : no night sweats [Fatigue] : no fatigue [Recent Change In Weight] : ~T no recent weight change [Vision Problems] : no vision problems [Nosebleeds] : no nosebleeds [Hoarseness] : no hoarseness [Palpitations] : no palpitations [Leg Claudication] : no intermittent leg claudication [Lower Ext Edema] : no lower extremity edema [Shortness Of Breath] : no shortness of breath [Wheezing] : no wheezing [Cough] : no cough [SOB on Exertion] : no shortness of breath during exertion [Vomiting] : no vomiting [Joint Pain] : no joint pain [Diarrhea] : no diarrhea [Joint Stiffness] : no joint stiffness [Muscle Pain] : no muscle pain

## 2021-06-01 NOTE — HISTORY OF PRESENT ILLNESS
[Therapy: ___] : Therapy: [unfilled] [Cycle: ___] : Cycle: [unfilled] [de-identified] : Mr. Dalal is a 66 year old male with PMH of DM, HTN, DLD, MAICOL who was recently admitted for suspected ITP. Patient was noted to have asymptomatic thrombocytopenia as an outpatient and referred to the ED. In the ED his platelet count was 5K with normal WBC and Hg. His smear was reviewed and showed 1-2 schistocytes, no clumping, large platelets, decrease number of platelets, normal WBC morphology/no blasts, some target cells. He was started on prednisone 1 mg/kg of prednisone, and he received two doses of IVIG. His platelets improved to 38 and he was discharged on prednisone. \par Of note, his platelets were 106 in June 2020, ad then 53 in January 2021. Also in January and then in February he received the Moderna vaccine. He does have high RBC count (6.45) and low MCV (73).\par Workup in the hospital showed normal US spleen, no evidence of hemolysis, normal LDH, hapto and ret count, HIV, HepB, HepC EBV IgM, CMV IgM NR , HIT negative , RF negative, peripheral flow negative\par \par Patient informed us he was not discharged with needles for his insulin. His fingersticks have in the low 200s. \par His platelets today are 116. No bleeding, bruising or petechiae. \par \par 5/14/2021- pt returns for follow up today.  feeling well.  some mild bruising where labs drawn otherwise no other bruising.  wbc 10.49 hgb 13.5 hct 23.0 platelets now 133. \par pt made f/up appt with new fer Doss for this monday 5/17/2021. P t checking FS at home sugar 130 today.   [FreeTextEntry1] : Started weekly Ritxuan on 5.28.2021 [de-identified] : 5/18/21: Mr. Dalal returns for followup. Since Saturday he has been taking 100mg prednsone daily, he was on 130mg prior to this. No evidence of bleeding, petechiae. His platelets dropped to 28K. We reviewed the smear and there was no clumping, 2-3 platelets per HPF. We told him to take an additional 30mg today.\par \par 5/21/21\par Patient is here for a follow-up visit for immune thrombocytopenia.  He is feeling well with no new complaints.  Reviewed most recent CBC, which shows PLTs are down to 12,000 from 28,000 earlier this week.  His PLT dropped even with increase of prednisone back up to 130mg daily.  Patient denies easier bruising or bleeding.  \par \par 5/26/21\par Patient is here for a follow-up visit for immune thrombocytopenia.  He is feeling well with no new complaints.  Patient denies easier bruising or bleeding.  Since last visit, he was sent to ER for complete workup and received IVIG x 2 days.  During hospital stay, we offered BMBx and to start Rituxan, but patient deferred at that time.  He has been taking Prednisone 130mg daily.  Reviewed most recent CBC which shows PLTs are down to 35,000 from 51,000 earlier this week.  \par \par 5/28/2021- Pt  is here for a follow-up visit for immune thrombocytopenia.  He is feeling well with no new complaints.  Patient denies easier bruising or bleeding.  No Headache no diziness no weakness.   pt was continued on prednisone.  Since last visit, his platelets went from  35,000 to 15,000\par ITP  refractory to current treatment; Discussed risks and benefits of Rituxan including side effects such as fatigue, diarrhea, rashes, hepatitis B reactivation, allergic reactions and death to name a few. Consent obtained.  \par \par 6/1/2021: Patient presents for followup. He started rituxan Friday, 5/28 after decreasing even with increase in steroids. His direct emelia is positive, IgG+, C3 negative.  Hg 13.0, retic is 2.7%. His platelets dropped to 7K. WIll start Romiplastin today.

## 2021-06-01 NOTE — ASSESSMENT
[FreeTextEntry1] : 65 yo with thrombocytopenia; 5K noted on 5..7.21. Review of the trend revelaed that pt has isolated thrombocytopenia of unclear etiology, that has started several months ago, exacerbated in winter 2021 (counts have decreased from 100K/ June 2020 to 50K/ January 2021 to 5k/ May 2021). Hamlet completed both doses of  MODERNA several weeks prior to the hospital presentation. \par \par Peripheral smear did not note schistocytes, spherocytes, aacnthocytes, polychromasia; no dysplastic immauture cells; labs did not reveal evidence of hemolysis\par \par Differential diagnosis includes;\par - idiopathic ITP\par - modernal caused ITP\par - NHL caused ITP\par - CTD associated ITP (ANA1:360)\par - MDS\par \par started on IV IG 1G/kg daily x2 (5/7/21-5/8/21) and on prednisone 1mg/kg beginning 5/7/21. He had a good response to IVIG 1 G/KG X2 and prednisone 1mg/kg with normalization of platelets count; upon slow tapering of steroids, platelets count nadired to 12K: he was re-challenged with iVIG 1G/KG x2 and prednisone dose was increased back to 1mg/kg: the effect is humble since platelets count has not normalized but decreased to 30sK\par \par Platelets counts improved to 116K (5/11/21) and \par 133K (5/14/21), when the dose of prednisone was decreased from 130mg to 100mg daily: \par 5/18/21: 28K , and dose of prednisone was increased back up to 130mg daily\par 5/21/21: 12K (started IVIG x 2 days and continued prednisone 130mg daily)\par 5/24/21: 51K\par 5/26/21: 35K\par 5/28/21: 16K\par 6/01/21: 7K\par \par \par HORACIO +\par \par IMPRESSION; REFRACTORY ITP\par PLAN:\par - pt appears distressed about his condition and is afraid it is life threatening; reassured the patient that we will keep a close look on him\par - concerned if this is refractory disease ; RECOMMEND TO CHANGE THE TREATMENT PLAN; in addition to continuing steroids (prednisone 1mg/kg daily) , he was started on rituxian/CD20 monoclonal antibody weekly x4: first weekly dose 5.28.21 outpatient\par - GIVEN the age of onset of isolated thrombocytopenia, need to r/o MDS and need to arrange IR guided bone marrow bx: in view of his anatomy ,this would be easier to assess by IR approach\par - CT CAP IVC to r/o NHL, pending scheduling\par - rheum eval in view of +HORACIO\par - emotional support given; case discussed within oncology group\par - s/p IVIG 1G/KG daily x 2 as of 5/22/21\par - c/w prednisone 130mg daily, rx renewed\par - he is aware of role of BMBx, pending scheduling\par - Rituxan x once weekly x4 weeks started 5.28.2021; Discussed risks and benefits of Rituxan including side effects such as fatigue, diarrhea, rashes, hepatitis B reactivation, allergic reactions and death to name a few. Consent obtained. \par \par - 6.1.21: His platelet count decreased to 7K today. He started on Rituxan 5/28, however this takes time to see an effect. In the interim, we will start Nplate 2mcg/kg weekly to increase platelets and decrease bleeding risk. \par A) We will taper my 1mck/kg if he has 2 consecutive platelet counts over 150-200K.\par B)  If platelet count < 50K then will increase by 1mcg/kg.  \par C) We will start Nplate ASAP 6.1.21, WEEKLY.while waiting for improvement from Rituxan.  \par  \par Microcytic anemia: Retic is 2.7%, Juan is positive with IgG+, C3-, LDH wnl, hapto wnl, TB 1.4, TSAT 12% with ferritin 55\par \par RTC on 6.4.21 for rituxan and appt, CBC check\par advised if any subtle signs of CVA / mucocutaneous bleed, immediately call 911 and get to ER\par \par PBS  reviewed; 0-1 platelets on most of the 100x HPF; no schistocytes  no spherocytes; no polychromasia; no acanthocytes

## 2021-06-01 NOTE — PHYSICAL EXAM
[Restricted in physically strenuous activity but ambulatory and able to carry out work of a light or sedentary nature] : Status 1- Restricted in physically strenuous activity but ambulatory and able to carry out work of a light or sedentary nature, e.g., light house work, office work [Obese] : obese [Normal] : affect appropriate [de-identified] : no bleed  [de-identified] : no mucocutaneous bleed

## 2021-06-01 NOTE — CONSULT LETTER
[Dear  ___] : Dear  [unfilled], [Consult Letter:] : I had the pleasure of evaluating your patient, [unfilled]. [Please see my note below.] : Please see my note below. [Sincerely,] : Sincerely, [FreeTextEntry3] : Elvis Rondon DO\par Attending Physician,\par Hematology/ Medical Oncology\par [phone] office\par \par

## 2021-06-04 ENCOUNTER — LABORATORY RESULT (OUTPATIENT)
Age: 66
End: 2021-06-04

## 2021-06-04 ENCOUNTER — APPOINTMENT (OUTPATIENT)
Dept: INFUSION THERAPY | Facility: CLINIC | Age: 66
End: 2021-06-04
Payer: MEDICARE

## 2021-06-04 ENCOUNTER — TRANSCRIPTION ENCOUNTER (OUTPATIENT)
Age: 66
End: 2021-06-04

## 2021-06-04 ENCOUNTER — APPOINTMENT (OUTPATIENT)
Dept: HEMATOLOGY ONCOLOGY | Facility: CLINIC | Age: 66
End: 2021-06-04
Payer: MEDICARE

## 2021-06-04 VITALS
TEMPERATURE: 98.2 F | HEIGHT: 66 IN | SYSTOLIC BLOOD PRESSURE: 128 MMHG | DIASTOLIC BLOOD PRESSURE: 59 MMHG | WEIGHT: 279 LBS | HEART RATE: 85 BPM | BODY MASS INDEX: 44.84 KG/M2 | RESPIRATION RATE: 16 BRPM

## 2021-06-04 LAB
HCT VFR BLD CALC: 41.4 %
HGB BLD-MCNC: 13 G/DL
MCHC RBC-ENTMCNC: 24.1 PG
MCHC RBC-ENTMCNC: 31.4 G/DL
MCV RBC AUTO: 76.8 FL
PLATELET # BLD AUTO: 29 K/UL
PMV BLD: NORMAL
RBC # BLD: 5.39 M/UL
RBC # FLD: 20.2 %
WBC # FLD AUTO: 10.07 K/UL

## 2021-06-04 PROCEDURE — 99214 OFFICE O/P EST MOD 30 MIN: CPT

## 2021-06-04 RX ORDER — DIPHENHYDRAMINE HCL 50 MG
50 CAPSULE ORAL ONCE
Refills: 0 | Status: COMPLETED | OUTPATIENT
Start: 2021-06-04 | End: 2021-06-04

## 2021-06-04 RX ORDER — DEXAMETHASONE 0.5 MG/5ML
4 ELIXIR ORAL ONCE
Refills: 0 | Status: COMPLETED | OUTPATIENT
Start: 2021-06-04 | End: 2021-06-04

## 2021-06-04 RX ORDER — ACETAMINOPHEN 500 MG
650 TABLET ORAL ONCE
Refills: 0 | Status: COMPLETED | OUTPATIENT
Start: 2021-06-04 | End: 2021-06-04

## 2021-06-04 RX ORDER — RITUXIMAB 10 MG/ML
865 INJECTION, SOLUTION INTRAVENOUS ONCE
Refills: 0 | Status: COMPLETED | OUTPATIENT
Start: 2021-06-04 | End: 2021-06-04

## 2021-06-04 RX ORDER — FAMOTIDINE 10 MG/ML
20 INJECTION INTRAVENOUS ONCE
Refills: 0 | Status: COMPLETED | OUTPATIENT
Start: 2021-06-04 | End: 2021-06-04

## 2021-06-04 RX ADMIN — RITUXIMAB 173 MILLIGRAM(S): 10 INJECTION, SOLUTION INTRAVENOUS at 11:30

## 2021-06-04 RX ADMIN — Medication 102 MILLIGRAM(S): at 11:40

## 2021-06-04 RX ADMIN — FAMOTIDINE 104 MILLIGRAM(S): 10 INJECTION INTRAVENOUS at 11:25

## 2021-06-04 RX ADMIN — Medication 650 MILLIGRAM(S): at 11:09

## 2021-06-04 RX ADMIN — Medication 102 MILLIGRAM(S): at 11:10

## 2021-06-04 RX ADMIN — Medication 650 MILLIGRAM(S): at 11:28

## 2021-06-04 NOTE — PHYSICAL EXAM
[Restricted in physically strenuous activity but ambulatory and able to carry out work of a light or sedentary nature] : Status 1- Restricted in physically strenuous activity but ambulatory and able to carry out work of a light or sedentary nature, e.g., light house work, office work [Obese] : obese [Normal] : affect appropriate [de-identified] : no bleed  [de-identified] : no mucocutaneous bleed

## 2021-06-04 NOTE — HISTORY OF PRESENT ILLNESS
[Therapy: ___] : Therapy: [unfilled] [Cycle: ___] : Cycle: [unfilled] [de-identified] : Mr. Dalal is a 66 year old male with PMH of DM, HTN, DLD, MAICOL who was recently admitted for suspected ITP. Patient was noted to have asymptomatic thrombocytopenia as an outpatient and referred to the ED. In the ED his platelet count was 5K with normal WBC and Hg. His smear was reviewed and showed 1-2 schistocytes, no clumping, large platelets, decrease number of platelets, normal WBC morphology/no blasts, some target cells. He was started on prednisone 1 mg/kg of prednisone, and he received two doses of IVIG. His platelets improved to 38 and he was discharged on prednisone. \par Of note, his platelets were 106 in June 2020, ad then 53 in January 2021. Also in January and then in February he received the Moderna vaccine. He does have high RBC count (6.45) and low MCV (73).\par Workup in the hospital showed normal US spleen, no evidence of hemolysis, normal LDH, hapto and ret count, HIV, HepB, HepC EBV IgM, CMV IgM NR , HIT negative , RF negative, peripheral flow negative\par \par Patient informed us he was not discharged with needles for his insulin. His fingersticks have in the low 200s. \par His platelets today are 116. No bleeding, bruising or petechiae. \par \par 5/14/2021- pt returns for follow up today.  feeling well.  some mild bruising where labs drawn otherwise no other bruising.  wbc 10.49 hgb 13.5 hct 23.0 platelets now 133. \par pt made f/up appt with new fer Doss for this monday 5/17/2021. P t checking FS at home sugar 130 today.   [FreeTextEntry1] : Started weekly Ritxuan on 5.28.2021 [de-identified] : 5/18/21: Mr. Dalal returns for followup. Since Saturday he has been taking 100mg prednsone daily, he was on 130mg prior to this. No evidence of bleeding, petechiae. His platelets dropped to 28K. We reviewed the smear and there was no clumping, 2-3 platelets per HPF. We told him to take an additional 30mg today.\par \par 5/21/21\par Patient is here for a follow-up visit for immune thrombocytopenia.  He is feeling well with no new complaints.  Reviewed most recent CBC, which shows PLTs are down to 12,000 from 28,000 earlier this week.  His PLT dropped even with increase of prednisone back up to 130mg daily.  Patient denies easier bruising or bleeding.  \par \par 5/26/21\par Patient is here for a follow-up visit for immune thrombocytopenia.  He is feeling well with no new complaints.  Patient denies easier bruising or bleeding.  Since last visit, he was sent to ER for complete workup and received IVIG x 2 days.  During hospital stay, we offered BMBx and to start Rituxan, but patient deferred at that time.  He has been taking Prednisone 130mg daily.  Reviewed most recent CBC which shows PLTs are down to 35,000 from 51,000 earlier this week.  \par \par 5/28/2021- Pt  is here for a follow-up visit for immune thrombocytopenia.  He is feeling well with no new complaints.  Patient denies easier bruising or bleeding.  No Headache no diziness no weakness.   pt was continued on prednisone.  Since last visit, his platelets went from  35,000 to 15,000\par ITP  refractory to current treatment; Discussed risks and benefits of Rituxan including side effects such as fatigue, diarrhea, rashes, hepatitis B reactivation, allergic reactions and death to name a few. Consent obtained.  \par \par 6/1/2021: Patient presents for followup. He started rituxan Friday, 5/28 after decreasing even with increase in steroids. His direct emelia is positive, IgG+, C3 negative.  Hg 13.0, retic is 2.7%. His platelets dropped to 7K. WIll start Romiplastin today.  \par \par 6/4/2021- Patient returns for follow up today for Immune Thrombocytopenia.  Pt was seen on Tuesday 6/1/2021 and Platelets were 7K.  Pt was started on Romiplastin.  He is feeling well.  Denies any bleeding no fatigue no rashes.  CBC done today Platelets now 29K. Pt remains on prednisone no swelling no insomnia. Pt has been trying to walk feeling well, loosing weight.

## 2021-06-04 NOTE — ASSESSMENT
[FreeTextEntry1] : 65 yo with thrombocytopenia; 5K noted on 5..7.21. Review of the trend revelaed that pt has isolated thrombocytopenia of unclear etiology, that has started several months ago, exacerbated in winter 2021 (counts have decreased from 100K/ June 2020 to 50K/ January 2021 to 5k/ May 2021). Hamlet completed both doses of  MODERNA several weeks prior to the hospital presentation. \par \par Peripheral smear did not note schistocytes, spherocytes, aacnthocytes, polychromasia; no dysplastic immauture cells; labs did not reveal evidence of hemolysis\par \par Differential diagnosis includes;\par - idiopathic ITP\par - modernal caused ITP\par - NHL caused ITP\par - CTD associated ITP (ANA1:360)\par - MDS\par \par started on IV IG 1G/kg daily x2 (5/7/21-5/8/21) and on prednisone 1mg/kg beginning 5/7/21. He had a good response to IVIG 1 G/KG X2 and prednisone 1mg/kg with normalization of platelets count; upon slow tapering of steroids, platelets count nadired to 12K: he was re-challenged with iVIG 1G/KG x2 and prednisone dose was increased back to 1mg/kg: the effect is humble since platelets count has not normalized but decreased to 30sK\par \par Platelets counts improved to 116K (5/11/21) and \par 133K (5/14/21), when the dose of prednisone was decreased from 130mg to 100mg daily: \par 5/18/21: 28K , and dose of prednisone was increased back up to 130mg daily\par 5/21/21: 12K (started IVIG x 2 days and continued prednisone 130mg daily)\par 5/24/21: 51K\par 5/26/21: 35K\par 5/28/21: 16K\par 6/01/21: 7K\par 6/04/21:  29 K\par \par IMPRESSION; REFRACTORY ITP\par PLAN:\par - pt appears distressed about his condition and is afraid it is life threatening; reassured the patient that we will keep a close look on him\par - concerned if this is refractory disease ; RECOMMEND TO CHANGE THE TREATMENT PLAN; in addition to continuing steroids (prednisone 1mg/kg daily) , he was started on rituxian/CD20 monoclonal antibody weekly x4: first weekly dose 5.28.21 outpatient\par - GIVEN the age of onset of isolated thrombocytopenia, need to r/o MDS and need to arrange IR guided bone marrow bx: in view of his anatomy ,this would be easier to assess by IR approach\par - CT CAP IVC to r/o NHL, pending scheduling\par - rheum eval in view of +HORACIO\par - emotional support given; case discussed within oncology group\par - s/p IVIG 1G/KG daily x 2 as of 5/22/21\par - c/w prednisone 130mg daily, rx renewed\par - he is aware of role of BMBx, pending scheduling\par - proceed with Rituxan weekly x4; started 5.28.2021\par \par As of 6.1.21: His platelet count decreased to 7K today. He started on Rituxan 5/28, however this takes time to see an effect. In the interim, we will start Nplate 2mcg/kg weekly to increase platelets and decrease bleeding risk. \par A) We will taper my 1mck/kg if he has 2 consecutive platelet counts over 150-200K.\par B)  If platelet count < 50K then will increase by 1mcg/kg.  \par C) started Nplate on 6.1.21, WEEKLY (while waiting for improvement from Rituxan)  \par  \par Microcytic anemia: Retic is 2.7%, Juan is positive with IgG+, C3-, LDH wnl, hapto wnl, TB 1.4, TSAT 12% with ferritin 55\par \par advised if any subtle signs of CVA / mucocutaneous bleed, immediately call 911 and get to ER\par \par PBS  reviewed; 0-1 platelets on most of the 100x HPF; no schistocytes  no spherocytes; no polychromasia; no acanthocytes\par \par As of 6/4/2021 : Pts platelet count was  29K.  Pt received Nplate 2mcg/kg on tuesday will repeat again for weekly dose on 6/8/2021\par \par - continue prednisone 130mg daily \par \par RTC on tuesday 6/8/2021 with repeat cbc + peripheral smear as well as next dose of Nplate

## 2021-06-04 NOTE — CONSULT LETTER
[Dear  ___] : Dear  [unfilled], [Consult Letter:] : I had the pleasure of evaluating your patient, [unfilled]. [Please see my note below.] : Please see my note below. [Sincerely,] : Sincerely, [FreeTextEntry3] : Elvis Rondon DO\par Attending Physician,\par Hematology/ Medical Oncology\par [phone] office\par \par

## 2021-06-07 ENCOUNTER — OUTPATIENT (OUTPATIENT)
Dept: OUTPATIENT SERVICES | Facility: HOSPITAL | Age: 66
LOS: 1 days | Discharge: HOME | End: 2021-06-07
Payer: MEDICARE

## 2021-06-07 VITALS
DIASTOLIC BLOOD PRESSURE: 72 MMHG | SYSTOLIC BLOOD PRESSURE: 118 MMHG | TEMPERATURE: 97 F | RESPIRATION RATE: 16 BRPM | WEIGHT: 285.06 LBS | HEART RATE: 80 BPM | HEIGHT: 68 IN

## 2021-06-07 DIAGNOSIS — Z01.818 ENCOUNTER FOR OTHER PREPROCEDURAL EXAMINATION: ICD-10-CM

## 2021-06-07 DIAGNOSIS — D69.3 IMMUNE THROMBOCYTOPENIC PURPURA: ICD-10-CM

## 2021-06-07 DIAGNOSIS — Z98.890 OTHER SPECIFIED POSTPROCEDURAL STATES: Chronic | ICD-10-CM

## 2021-06-07 LAB
A1C WITH ESTIMATED AVERAGE GLUCOSE RESULT: 6.9 % — HIGH (ref 4–5.6)
ALBUMIN SERPL ELPH-MCNC: 3.8 G/DL — SIGNIFICANT CHANGE UP (ref 3.5–5.2)
ALP SERPL-CCNC: 52 U/L — SIGNIFICANT CHANGE UP (ref 30–115)
ALT FLD-CCNC: 43 U/L — HIGH (ref 0–41)
ANION GAP SERPL CALC-SCNC: 12 MMOL/L — SIGNIFICANT CHANGE UP (ref 7–14)
APTT BLD: 26.6 SEC — LOW (ref 27–39.2)
AST SERPL-CCNC: 33 U/L — SIGNIFICANT CHANGE UP (ref 0–41)
BASOPHILS # BLD AUTO: 0.01 K/UL — SIGNIFICANT CHANGE UP (ref 0–0.2)
BASOPHILS NFR BLD AUTO: 0.1 % — SIGNIFICANT CHANGE UP (ref 0–1)
BILIRUB SERPL-MCNC: 0.7 MG/DL — SIGNIFICANT CHANGE UP (ref 0.2–1.2)
BUN SERPL-MCNC: 31 MG/DL — HIGH (ref 10–20)
CALCIUM SERPL-MCNC: 9.2 MG/DL — SIGNIFICANT CHANGE UP (ref 8.5–10.1)
CHLORIDE SERPL-SCNC: 100 MMOL/L — SIGNIFICANT CHANGE UP (ref 98–110)
CO2 SERPL-SCNC: 26 MMOL/L — SIGNIFICANT CHANGE UP (ref 17–32)
CREAT SERPL-MCNC: 0.9 MG/DL — SIGNIFICANT CHANGE UP (ref 0.7–1.5)
EOSINOPHIL # BLD AUTO: 0.01 K/UL — SIGNIFICANT CHANGE UP (ref 0–0.7)
EOSINOPHIL NFR BLD AUTO: 0.1 % — SIGNIFICANT CHANGE UP (ref 0–8)
ESTIMATED AVERAGE GLUCOSE: 151 MG/DL — HIGH (ref 68–114)
GLUCOSE SERPL-MCNC: 191 MG/DL — HIGH (ref 70–99)
HCT VFR BLD CALC: 41.6 % — LOW (ref 42–52)
HGB BLD-MCNC: 12.9 G/DL — LOW (ref 14–18)
IMM GRANULOCYTES NFR BLD AUTO: 0.7 % — HIGH (ref 0.1–0.3)
INR BLD: 1.01 RATIO — SIGNIFICANT CHANGE UP (ref 0.65–1.3)
LYMPHOCYTES # BLD AUTO: 0.26 K/UL — LOW (ref 1.2–3.4)
LYMPHOCYTES # BLD AUTO: 3.2 % — LOW (ref 20.5–51.1)
MCHC RBC-ENTMCNC: 24 PG — LOW (ref 27–31)
MCHC RBC-ENTMCNC: 31 G/DL — LOW (ref 32–37)
MCV RBC AUTO: 77.3 FL — LOW (ref 80–94)
MONOCYTES # BLD AUTO: 0.17 K/UL — SIGNIFICANT CHANGE UP (ref 0.1–0.6)
MONOCYTES NFR BLD AUTO: 2.1 % — SIGNIFICANT CHANGE UP (ref 1.7–9.3)
NEUTROPHILS # BLD AUTO: 7.69 K/UL — HIGH (ref 1.4–6.5)
NEUTROPHILS NFR BLD AUTO: 93.8 % — HIGH (ref 42.2–75.2)
NRBC # BLD: 0 /100 WBCS — SIGNIFICANT CHANGE UP (ref 0–0)
PLATELET # BLD AUTO: 145 K/UL — SIGNIFICANT CHANGE UP (ref 130–400)
POTASSIUM SERPL-MCNC: 4.2 MMOL/L — SIGNIFICANT CHANGE UP (ref 3.5–5)
POTASSIUM SERPL-SCNC: 4.2 MMOL/L — SIGNIFICANT CHANGE UP (ref 3.5–5)
PROT SERPL-MCNC: 7 G/DL — SIGNIFICANT CHANGE UP (ref 6–8)
PROTHROM AB SERPL-ACNC: 11.6 SEC — SIGNIFICANT CHANGE UP (ref 9.95–12.87)
RBC # BLD: 5.38 M/UL — SIGNIFICANT CHANGE UP (ref 4.7–6.1)
RBC # FLD: 21.2 % — HIGH (ref 11.5–14.5)
SODIUM SERPL-SCNC: 138 MMOL/L — SIGNIFICANT CHANGE UP (ref 135–146)
WBC # BLD: 8.2 K/UL — SIGNIFICANT CHANGE UP (ref 4.8–10.8)
WBC # FLD AUTO: 8.2 K/UL — SIGNIFICANT CHANGE UP (ref 4.8–10.8)

## 2021-06-07 PROCEDURE — 93010 ELECTROCARDIOGRAM REPORT: CPT

## 2021-06-07 RX ORDER — ATORVASTATIN CALCIUM 80 MG/1
1 TABLET, FILM COATED ORAL
Qty: 0 | Refills: 0 | DISCHARGE

## 2021-06-07 RX ORDER — LOSARTAN POTASSIUM 100 MG/1
1 TABLET, FILM COATED ORAL
Qty: 0 | Refills: 0 | DISCHARGE

## 2021-06-07 RX ORDER — FLUTICASONE PROPIONATE 50 MCG
1 SPRAY, SUSPENSION NASAL
Qty: 0 | Refills: 0 | DISCHARGE

## 2021-06-07 RX ORDER — DULAGLUTIDE 4.5 MG/.5ML
0 INJECTION, SOLUTION SUBCUTANEOUS
Qty: 0 | Refills: 0 | DISCHARGE

## 2021-06-07 NOTE — H&P PST ADULT - HISTORY OF PRESENT ILLNESS
65 Y/O MALE PT TO PAST WITH HX             PT C/O NEED FOR BONE MARROW BX  PT NOW FOR SCHEDULED PROCEDURE ( BONE MARROW BX ) . PT DENIES ANY CP SOB PALP COUGH DYSURIA FEVER URI. PT ABLE TO GLENDY 1-2 FOS W/O SOB  pt denies any covid s/s, or tested positive in the past  pt advised self quarantine till day of procedure  Anesthesia Alert  NO--Difficult Airway  NO--History of neck surgery or radiation  NO--Limited ROM of neck  NO--History of Malignant hyperthermia  NO--Personal or family history of Pseudocholinesterase deficiency.  NO--Prior Anesthesia Complication  NO--Latex Allergy  NO--Loose teeth  NO--History of Rheumatoid Arthritis  NO--MAICOL  = BLEEDING RISK  - ITP     65 Y/O MALE PT TO PAST WITH HX ITP  PT C/O NEED FOR BONE MARROW BX  PT NOW FOR SCHEDULED PROCEDURE ( BONE MARROW BX ) . PT DENIES ANY CP SOB PALP COUGH DYSURIA FEVER URI. PT ABLE TO GLENDY 1-2 FOS W/O SOB  pt denies any covid s/s, or tested positive in the past  pt advised self quarantine till day of procedure  Anesthesia Alert  NO--Difficult Airway  NO--History of neck surgery or radiation  NO--Limited ROM of neck  NO--History of Malignant hyperthermia  NO--Personal or family history of Pseudocholinesterase deficiency.  NO--Prior Anesthesia Complication  NO--Latex Allergy  NO--Loose teeth  NO--History of Rheumatoid Arthritis  NO--MAICOL  = BLEEDING RISK  - ITP     67 Y/O MALE PT TO PAST WITH HX ITP  PT C/O NEED FOR BONE MARROW BX R/O CA PT NOW FOR SCHEDULED PROCEDURE ( BONE MARROW BX ) . PT DENIES ANY CP SOB PALP COUGH DYSURIA FEVER URI. PT ABLE TO GLENDY 1-2 FOS W/O SOB  PT STATES HAS RCVD RITUXAN IV ( ' COUPLE OF TX )   pt denies any covid s/s, or tested positive in the past  pt advised self quarantine till day of procedure  Anesthesia Alert  NO--Difficult Airway  NO--History of neck surgery or radiation  NO--Limited ROM of neck  NO--History of Malignant hyperthermia  NO--Personal or family history of Pseudocholinesterase deficiency.  NO--Prior Anesthesia Complication  NO--Latex Allergy  NO--Loose teeth  NO--History of Rheumatoid Arthritis  NO--MAICOL  = BLEEDING RISK  - ITP

## 2021-06-07 NOTE — H&P PST ADULT - NSICDXFAMILYHX_GEN_ALL_CORE_FT
FAMILY HISTORY:  Father  Still living? Unknown  FH: colon cancer, Age at diagnosis: Age Unknown    Mother  Still living? Unknown  FH: colon cancer, Age at diagnosis: Age Unknown

## 2021-06-08 ENCOUNTER — APPOINTMENT (OUTPATIENT)
Dept: HEMATOLOGY ONCOLOGY | Facility: CLINIC | Age: 66
End: 2021-06-08
Payer: MEDICARE

## 2021-06-08 ENCOUNTER — APPOINTMENT (OUTPATIENT)
Dept: INFUSION THERAPY | Facility: CLINIC | Age: 66
End: 2021-06-08
Payer: MEDICARE

## 2021-06-08 ENCOUNTER — LABORATORY RESULT (OUTPATIENT)
Age: 66
End: 2021-06-08

## 2021-06-08 VITALS
RESPIRATION RATE: 16 BRPM | BODY MASS INDEX: 44.84 KG/M2 | HEART RATE: 86 BPM | SYSTOLIC BLOOD PRESSURE: 160 MMHG | WEIGHT: 279 LBS | HEIGHT: 66 IN | TEMPERATURE: 98.7 F | DIASTOLIC BLOOD PRESSURE: 77 MMHG

## 2021-06-08 PROCEDURE — 99214 OFFICE O/P EST MOD 30 MIN: CPT

## 2021-06-08 RX ORDER — ROMIPLOSTIM 250 UG/.5ML
260 INJECTION, POWDER, LYOPHILIZED, FOR SOLUTION SUBCUTANEOUS ONCE
Refills: 0 | Status: COMPLETED | OUTPATIENT
Start: 2021-06-08 | End: 2021-06-08

## 2021-06-08 RX ADMIN — ROMIPLOSTIM 260 MICROGRAM(S): 250 INJECTION, POWDER, LYOPHILIZED, FOR SOLUTION SUBCUTANEOUS at 16:16

## 2021-06-08 NOTE — PHYSICAL EXAM
[Restricted in physically strenuous activity but ambulatory and able to carry out work of a light or sedentary nature] : Status 1- Restricted in physically strenuous activity but ambulatory and able to carry out work of a light or sedentary nature, e.g., light house work, office work [Obese] : obese [Normal] : affect appropriate [de-identified] : no bleed  [de-identified] : no mucocutaneous bleed

## 2021-06-08 NOTE — HISTORY OF PRESENT ILLNESS
[Therapy: ___] : Therapy: [unfilled] [Cycle: ___] : Cycle: [unfilled] [de-identified] : Mr. Dalal is a 66 year old male with PMH of DM, HTN, DLD, MAICOL who was recently admitted for suspected ITP. Patient was noted to have asymptomatic thrombocytopenia as an outpatient and referred to the ED. In the ED his platelet count was 5K with normal WBC and Hg. His smear was reviewed and showed 1-2 schistocytes, no clumping, large platelets, decrease number of platelets, normal WBC morphology/no blasts, some target cells. He was started on prednisone 1 mg/kg of prednisone, and he received two doses of IVIG. His platelets improved to 38 and he was discharged on prednisone. \par Of note, his platelets were 106 in June 2020, ad then 53 in January 2021. Also in January and then in February he received the Moderna vaccine. He does have high RBC count (6.45) and low MCV (73).\par Workup in the hospital showed normal US spleen, no evidence of hemolysis, normal LDH, hapto and ret count, HIV, HepB, HepC EBV IgM, CMV IgM NR , HIT negative , RF negative, peripheral flow negative\par \par Patient informed us he was not discharged with needles for his insulin. His fingersticks have in the low 200s. \par His platelets today are 116. No bleeding, bruising or petechiae. \par \par 5/14/2021- pt returns for follow up today.  feeling well.  some mild bruising where labs drawn otherwise no other bruising.  wbc 10.49 hgb 13.5 hct 23.0 platelets now 133. \par pt made f/up appt with new fer Doss for this monday 5/17/2021. P t checking FS at home sugar 130 today.   [FreeTextEntry1] : Started weekly Ritxuan on 5.28.2021 [de-identified] : 5/18/21: Mr. Dalal returns for followup. Since Saturday he has been taking 100mg prednsone daily, he was on 130mg prior to this. No evidence of bleeding, petechiae. His platelets dropped to 28K. We reviewed the smear and there was no clumping, 2-3 platelets per HPF. We told him to take an additional 30mg today.\par \par 5/21/21\par Patient is here for a follow-up visit for immune thrombocytopenia.  He is feeling well with no new complaints.  Reviewed most recent CBC, which shows PLTs are down to 12,000 from 28,000 earlier this week.  His PLT dropped even with increase of prednisone back up to 130mg daily.  Patient denies easier bruising or bleeding.  \par \par 5/26/21\par Patient is here for a follow-up visit for immune thrombocytopenia.  He is feeling well with no new complaints.  Patient denies easier bruising or bleeding.  Since last visit, he was sent to ER for complete workup and received IVIG x 2 days.  During hospital stay, we offered BMBx and to start Rituxan, but patient deferred at that time.  He has been taking Prednisone 130mg daily.  Reviewed most recent CBC which shows PLTs are down to 35,000 from 51,000 earlier this week.  \par \par 5/28/2021- Pt  is here for a follow-up visit for immune thrombocytopenia.  He is feeling well with no new complaints.  Patient denies easier bruising or bleeding.  No Headache no diziness no weakness.   pt was continued on prednisone.  Since last visit, his platelets went from  35,000 to 15,000\par ITP  refractory to current treatment; Discussed risks and benefits of Rituxan including side effects such as fatigue, diarrhea, rashes, hepatitis B reactivation, allergic reactions and death to name a few. Consent obtained.  \par \par 6/1/2021: Patient presents for followup. He started rituxan Friday, 5/28 after decreasing even with increase in steroids. His direct emelia is positive, IgG+, C3 negative.  Hg 13.0, retic is 2.7%. His platelets dropped to 7K. WIll start Romiplastin today.  \par \par 6/4/2021- Patient returns for follow up today for Immune Thrombocytopenia.  Pt was seen on Tuesday 6/1/2021 and Platelets were 7K.  Pt was started on Romiplastin.  He is feeling well.  Denies any bleeding no fatigue no rashes.  CBC done today Platelets now 29K. Pt remains on prednisone no swelling no insomnia. Pt has been trying to walk feeling well, loosing weight.  \par \par 6/8/2021: Mr Dalal returns for followup of ITP. His platelets are now 145. He was started on romiplastim last week and he is continuing with rituxan weekly and prednisone.  He denies bleeding, petechiae, rash, melena, BRBPR\par \par

## 2021-06-08 NOTE — ASSESSMENT
[FreeTextEntry1] : 65 yo with thrombocytopenia; 5K noted on 5..7.21. Review of the trend revelaed that pt has isolated thrombocytopenia of unclear etiology, that has started several months ago, exacerbated in winter 2021 (counts have decreased from 100K/ June 2020 to 50K/ January 2021 to 5k/ May 2021). Hamlet completed both doses of  MODERNA several weeks prior to the hospital presentation. \par \par Peripheral smear did not note schistocytes, spherocytes, aacnthocytes, polychromasia; no dysplastic immauture cells; labs did not reveal evidence of hemolysis\par \par Differential diagnosis includes;\par - idiopathic ITP\par - modernal caused ITP\par - NHL caused ITP\par - CTD associated ITP (ANA1:360)\par - MDS\par \par started on IV IG 1G/kg daily x2 (5/7/21-5/8/21) and on prednisone 1mg/kg beginning 5/7/21. He had a good response to IVIG 1 G/KG X2 and prednisone 1mg/kg with normalization of platelets count; upon slow tapering of steroids, platelets count nadired to 12K: he was re-challenged with iVIG 1G/KG x2 and prednisone dose was increased back to 1mg/kg: the effect is humble since platelets count has not normalized but decreased to 30sK\par \par Platelets counts improved to 116K (5/11/21) and \par 133K (5/14/21), when the dose of prednisone was decreased from 130mg to 100mg daily: \par 5/18/21: 28K , and dose of prednisone was increased back up to 130mg daily\par 5/21/21: 12K (started IVIG x 2 days and continued prednisone 130mg daily)\par 5/24/21: 51K\par 5/26/21: 35K\par 5/28/21: 16K\par 6/01/21: 7K (started romiplastim)\par 6/04/21:  29 K\par 6/08/21: 138K\par \par IMPRESSION; REFRACTORY ITP\par PLAN:\par - pt appears distressed about his condition and is afraid it is life threatening; reassured the patient that we will keep a close look on him\par - concerned if this is refractory disease ; RECOMMEND TO CHANGE THE TREATMENT PLAN; in addition to continuing steroids (prednisone 1mg/kg daily) , he was started on rituxian/CD20 monoclonal antibody weekly x4: first weekly dose 5.28.21 outpatient\par - GIVEN the age of onset of isolated thrombocytopenia, need to r/o MDS and need to arrange IR guided bone marrow bx: in view of his anatomy ,this would be easier to assess by IR approach\par - rheum eval in view of +HORACIO\par - emotional support given; case discussed within oncology group\par - s/p IVIG 1G/KG daily x 2 as of 5/22/21\par - c/w prednisone 130mg daily, rx renewed\par - he is aware of role of BMBx, pending scheduling\par - proceed with Rituxan weekly x4; started 5.28.2021\par - CT CAP IVC to r/o NHL, scheduled for Ama 10\par - He is planned for bone marrow biopsy June 14th\par \par  6.1.21: His platelet count decreased to 7K. \par PBS  reviewed; 0-1 platelets on most of the 100x HPF; no schistocytes  no spherocytes; no polychromasia; no acanthocytes\par He started on Rituxan 5/28. We started romiplastim 6/1, and his platelets improved to 29K and then 138K. Will continue with Nplate 2mcg/kg weekly to increase platelets and decrease bleeding risk. \par \par A) We will taper my 1mcg/kg if he has 2 consecutive platelet counts over 150-200K.\par B)  If platelet count < 50K then will increase by 1mcg/kg.  \par C) started Nplate on 6.1.21, WEEKLY (while waiting for improvement from Rituxan)  \par  \par advised if any subtle signs of CVA / mucocutaneous bleed, immediately call 911 and get to ER\par \par - continue prednisone 130mg daily : if counts remain normal, will consider starting taper down on 6.11.21\par - Continue Nplate 2mcg/kg today, weekly\par - C/w rituxan on Friday, weekly dose 3/4\par - Bone marrow biopsy with IR June 14\par CT CAP 6/17/21: will request PA /NP team to follow\par \par Microcytic anemia: Retic is 2.7%, Juan is positive with IgG+, C3-, LDH wnl, hapto wnl, TB 1.4, TSAT 12% with ferritin 55\par \par RTC on Friday 6/11/2021for CBC and Rituxan

## 2021-06-08 NOTE — CONSULT LETTER
[Dear  ___] : Dear  [unfilled], [Consult Letter:] : I had the pleasure of evaluating your patient, [unfilled]. [Please see my note below.] : Please see my note below. [Sincerely,] : Sincerely, [FreeTextEntry3] : Elvis Rondon DO\par Attending Physician,\par Hematology/ Medical Oncology\par [phone] office\par \par

## 2021-06-09 LAB
HCT VFR BLD CALC: 42.1 %
HGB BLD-MCNC: 13 G/DL
MCHC RBC-ENTMCNC: 24.1 PG
MCHC RBC-ENTMCNC: 30.9 G/DL
MCV RBC AUTO: 78.1 FL
PLATELET # BLD AUTO: 161 K/UL
PMV BLD: 12.2 FL
RBC # BLD: 5.39 M/UL
RBC # FLD: 20.9 %
WBC # FLD AUTO: 9.2 K/UL

## 2021-06-11 ENCOUNTER — APPOINTMENT (OUTPATIENT)
Dept: INFUSION THERAPY | Facility: CLINIC | Age: 66
End: 2021-06-11
Payer: MEDICARE

## 2021-06-11 ENCOUNTER — LABORATORY RESULT (OUTPATIENT)
Age: 66
End: 2021-06-11

## 2021-06-11 ENCOUNTER — OUTPATIENT (OUTPATIENT)
Dept: OUTPATIENT SERVICES | Facility: HOSPITAL | Age: 66
LOS: 1 days | Discharge: HOME | End: 2021-06-11

## 2021-06-11 ENCOUNTER — APPOINTMENT (OUTPATIENT)
Dept: HEMATOLOGY ONCOLOGY | Facility: CLINIC | Age: 66
End: 2021-06-11
Payer: MEDICARE

## 2021-06-11 VITALS
HEIGHT: 66 IN | WEIGHT: 279 LBS | HEART RATE: 86 BPM | TEMPERATURE: 97.9 F | DIASTOLIC BLOOD PRESSURE: 68 MMHG | BODY MASS INDEX: 44.84 KG/M2 | SYSTOLIC BLOOD PRESSURE: 117 MMHG | RESPIRATION RATE: 16 BRPM

## 2021-06-11 DIAGNOSIS — Z98.890 OTHER SPECIFIED POSTPROCEDURAL STATES: Chronic | ICD-10-CM

## 2021-06-11 DIAGNOSIS — Z11.59 ENCOUNTER FOR SCREENING FOR OTHER VIRAL DISEASES: ICD-10-CM

## 2021-06-11 LAB
ALBUMIN SERPL ELPH-MCNC: 3.6 G/DL
ALP BLD-CCNC: 52 U/L
ALT SERPL-CCNC: 51 U/L
ANION GAP SERPL CALC-SCNC: 12 MMOL/L
AST SERPL-CCNC: 37 U/L
BILIRUB DIRECT SERPL-MCNC: 0.2 MG/DL
BILIRUB INDIRECT SERPL-MCNC: 0.5 MG/DL
BILIRUB SERPL-MCNC: 0.7 MG/DL
BUN SERPL-MCNC: 29 MG/DL
CALCIUM SERPL-MCNC: 9.5 MG/DL
CHLORIDE SERPL-SCNC: 98 MMOL/L
CO2 SERPL-SCNC: 29 MMOL/L
CREAT SERPL-MCNC: 0.8 MG/DL
GLUCOSE SERPL-MCNC: 160 MG/DL
HCT VFR BLD CALC: 42.4 %
HGB BLD-MCNC: 13.4 G/DL
MCHC RBC-ENTMCNC: 24.5 PG
MCHC RBC-ENTMCNC: 31.6 G/DL
MCV RBC AUTO: 77.5 FL
PLATELET # BLD AUTO: 163 K/UL
PMV BLD: 11.2 FL
POTASSIUM SERPL-SCNC: 4.3 MMOL/L
PROT SERPL-MCNC: 6.8 G/DL
RBC # BLD: 5.47 M/UL
RBC # FLD: 20.5 %
SODIUM SERPL-SCNC: 139 MMOL/L
WBC # FLD AUTO: 9.2 K/UL

## 2021-06-11 PROCEDURE — 99214 OFFICE O/P EST MOD 30 MIN: CPT

## 2021-06-11 RX ORDER — ACETAMINOPHEN 500 MG
650 TABLET ORAL ONCE
Refills: 0 | Status: COMPLETED | OUTPATIENT
Start: 2021-06-11 | End: 2021-06-11

## 2021-06-11 RX ORDER — RITUXIMAB 10 MG/ML
865 INJECTION, SOLUTION INTRAVENOUS ONCE
Refills: 0 | Status: COMPLETED | OUTPATIENT
Start: 2021-06-11 | End: 2021-06-11

## 2021-06-11 RX ORDER — DIPHENHYDRAMINE HCL 50 MG
50 CAPSULE ORAL ONCE
Refills: 0 | Status: COMPLETED | OUTPATIENT
Start: 2021-06-11 | End: 2021-06-11

## 2021-06-11 RX ORDER — DEXAMETHASONE 0.5 MG/5ML
4 ELIXIR ORAL ONCE
Refills: 0 | Status: COMPLETED | OUTPATIENT
Start: 2021-06-11 | End: 2021-06-11

## 2021-06-11 RX ORDER — FAMOTIDINE 10 MG/ML
20 INJECTION INTRAVENOUS ONCE
Refills: 0 | Status: COMPLETED | OUTPATIENT
Start: 2021-06-11 | End: 2021-06-11

## 2021-06-11 RX ADMIN — Medication 650 MILLIGRAM(S): at 10:30

## 2021-06-11 RX ADMIN — FAMOTIDINE 104 MILLIGRAM(S): 10 INJECTION INTRAVENOUS at 10:30

## 2021-06-11 RX ADMIN — RITUXIMAB 173 MILLIGRAM(S): 10 INJECTION, SOLUTION INTRAVENOUS at 10:57

## 2021-06-11 RX ADMIN — Medication 102 MILLIGRAM(S): at 10:30

## 2021-06-11 NOTE — ASSESSMENT
[FreeTextEntry1] : 65 yo with thrombocytopenia; 5K noted on 5..7.21. Review of the trend revelaed that pt has isolated thrombocytopenia of unclear etiology, that has started several months ago, exacerbated in winter 2021 (counts have decreased from 100K/ June 2020 to 50K/ January 2021 to 5k/ May 2021). Hamlet completed both doses of  MODERNA several weeks prior to the hospital presentation. \par \par Peripheral smear did not note schistocytes, spherocytes, aacnthocytes, polychromasia; no dysplastic immauture cells; labs did not reveal evidence of hemolysis\par \par Differential diagnosis includes;\par - idiopathic ITP\par - modernal caused ITP\par - NHL caused ITP\par - CTD associated ITP (ANA1:360)\par - MDS\par \par started on IV IG 1G/kg daily x2 (5/7/21-5/8/21) and on prednisone 1mg/kg beginning 5/7/21. He had a good response to IVIG 1 G/KG X2 and prednisone 1mg/kg with normalization of platelets count; upon slow tapering of steroids, platelets count nadired to 12K: he was re-challenged with iVIG 1G/KG x2 and prednisone dose was increased back to 1mg/kg: the effect is humble since platelets count has not normalized but decreased to 30sK\par \par Platelets counts improved to 116K (5/11/21) and \par 133K (5/14/21), when the dose of prednisone was decreased from 130mg to 100mg daily: \par 5/18/21: 28K , and dose of prednisone was increased back up to 130mg daily\par 5/21/21: 12K (started IVIG x 2 days and continued prednisone 130mg daily)\par 5/24/21: 51K\par 5/26/21: 35K\par 5/28/21: 16K\par 6/01/21: 7K (started romiplastim)\par 6/04/21:  29 K\par 6/08/21: 138K\par 6/11/21:  163K\par \par IMPRESSION; REFRACTORY ITP\par PLAN:\par - pt appears distressed about his condition and is afraid it is life threatening; reassured the patient that we will keep a close look on him\par - concerned if this is refractory disease ; RECOMMEND TO CHANGE THE TREATMENT PLAN; in addition to continuing steroids (prednisone 1mg/kg daily) , he was started on rituxian/CD20 monoclonal antibody weekly x4: first weekly dose 5.28.21 outpatient\par - GIVEN the age of onset of isolated thrombocytopenia, need to r/o MDS and need to arrange IR guided bone marrow bx: in view of his anatomy ,this would be easier to assess by IR approach\par - rheum eval in view of +HORACIO\par - emotional support given; case discussed within oncology group\par - s/p IVIG 1G/KG daily x 2 as of 5/22/21\par - c/w prednisone 130mg daily, rx renewed\par - he is aware of role of BMBx, scheduled 6/14/21\par - proceed with Rituxan weekly x4; started 5.28.2021\par - CT CAP IVC to r/o NHL, scheduled for June 17\par \par  6.1.21: His platelet count decreased to 7K. \par PBS  reviewed; 0-1 platelets on most of the 100x HPF; no schistocytes  no spherocytes; no polychromasia; no acanthocytes\par He started on Rituxan 5/28. We started romiplastim 6/1, and his platelets improved to 29K and then 138K. Will continue with Nplate 2mcg/kg weekly to increase platelets and decrease bleeding risk. \par \par A) We will taper my 1mcg/kg if he has 2 consecutive platelet counts over 150-200K.\par B)  If platelet count < 50K then will increase by 1mcg/kg.  \par C) started Nplate on 6.1.21, WEEKLY (while waiting for improvement from Rituxan).  Received Npate 6/8/21, next scheduled on  6/15/21\par  \par advised if any subtle signs of CVA / mucocutaneous bleed, immediately call 911 and get to ER\par \par - continue prednisone 130mg daily : if counts remain normal, will consider starting taper down on 6.11.21\par - Continue Nplate 2mcg/kg , dose 2 on 6.8.21, weekly\par - C/w rituxan on Friday, weekly dose 3/4\par - Bone marrow biopsy with IR June 14\par CT CAP 6/17/21: will request PA /NP team to follow\par \par Microcytic anemia: Retic is 2.7%, Juan is positive with IgG+, C3-, LDH wnl, hapto wnl, TB 1.4, TSAT 12% with ferritin 55\par \par RTC on Friday 6/11/2021for CBC and Rituxan

## 2021-06-11 NOTE — HISTORY OF PRESENT ILLNESS
[Therapy: ___] : Therapy: [unfilled] [Cycle: ___] : Cycle: [unfilled] [de-identified] : Mr. Dalal is a 66 year old male with PMH of DM, HTN, DLD, MAICOL who was recently admitted for suspected ITP. Patient was noted to have asymptomatic thrombocytopenia as an outpatient and referred to the ED. In the ED his platelet count was 5K with normal WBC and Hg. His smear was reviewed and showed 1-2 schistocytes, no clumping, large platelets, decrease number of platelets, normal WBC morphology/no blasts, some target cells. He was started on prednisone 1 mg/kg of prednisone, and he received two doses of IVIG. His platelets improved to 38 and he was discharged on prednisone. \par Of note, his platelets were 106 in June 2020, ad then 53 in January 2021. Also in January and then in February he received the Moderna vaccine. He does have high RBC count (6.45) and low MCV (73).\par Workup in the hospital showed normal US spleen, no evidence of hemolysis, normal LDH, hapto and ret count, HIV, HepB, HepC EBV IgM, CMV IgM NR , HIT negative , RF negative, peripheral flow negative\par \par Patient informed us he was not discharged with needles for his insulin. His fingersticks have in the low 200s. \par His platelets today are 116. No bleeding, bruising or petechiae. \par \par 5/14/2021- pt returns for follow up today.  feeling well.  some mild bruising where labs drawn otherwise no other bruising.  wbc 10.49 hgb 13.5 hct 23.0 platelets now 133. \par pt made f/up appt with new fer Doss for this monday 5/17/2021. P t checking FS at home sugar 130 today.   [FreeTextEntry1] : Started weekly Ritxuan on 5.28.2021 [de-identified] : 5/18/21: Mr. Dalal returns for followup. Since Saturday he has been taking 100mg prednsone daily, he was on 130mg prior to this. No evidence of bleeding, petechiae. His platelets dropped to 28K. We reviewed the smear and there was no clumping, 2-3 platelets per HPF. We told him to take an additional 30mg today.\par \par 5/21/21\par Patient is here for a follow-up visit for immune thrombocytopenia.  He is feeling well with no new complaints.  Reviewed most recent CBC, which shows PLTs are down to 12,000 from 28,000 earlier this week.  His PLT dropped even with increase of prednisone back up to 130mg daily.  Patient denies easier bruising or bleeding.  \par \par 5/26/21\par Patient is here for a follow-up visit for immune thrombocytopenia.  He is feeling well with no new complaints.  Patient denies easier bruising or bleeding.  Since last visit, he was sent to ER for complete workup and received IVIG x 2 days.  During hospital stay, we offered BMBx and to start Rituxan, but patient deferred at that time.  He has been taking Prednisone 130mg daily.  Reviewed most recent CBC which shows PLTs are down to 35,000 from 51,000 earlier this week.  \par \par 5/28/2021- Pt  is here for a follow-up visit for immune thrombocytopenia.  He is feeling well with no new complaints.  Patient denies easier bruising or bleeding.  No Headache no diziness no weakness.   pt was continued on prednisone.  Since last visit, his platelets went from  35,000 to 15,000\par ITP  refractory to current treatment; Discussed risks and benefits of Rituxan including side effects such as fatigue, diarrhea, rashes, hepatitis B reactivation, allergic reactions and death to name a few. Consent obtained.  \par \par 6/1/2021: Patient presents for followup. He started rituxan Friday, 5/28 after decreasing even with increase in steroids. His direct emelia is positive, IgG+, C3 negative.  Hg 13.0, retic is 2.7%. His platelets dropped to 7K. WIll start Romiplastin today.  \par \par 6/4/2021- Patient returns for follow up today for Immune Thrombocytopenia.  Pt was seen on Tuesday 6/1/2021 and Platelets were 7K.  Pt was started on Romiplastin.  He is feeling well.  Denies any bleeding no fatigue no rashes.  CBC done today Platelets now 29K. Pt remains on prednisone no swelling no insomnia. Pt has been trying to walk feeling well, loosing weight.  \par \par 6/8/2021: Mr Dalal returns for followup of ITP. His platelets are now 145. He was started on romiplastim last week and he is continuing with rituxan weekly and prednisone.  He denies bleeding, petechiae, rash, melena, BRBPR\par \par 6/11/21\par Pt is here today for follow up visit for ITP.  His platelets improved, now 163. He was started on romiplastim 6/1/21 and he is continuing with rituxan weekly and prednisone.  He denies bleeding, petechiae, rash, melena, BRBPR.  He is scheduled for bone marrow bx on 6/14/21 at IR and CT scan C/A/P with contrast on 6/17/21.\par \par

## 2021-06-11 NOTE — CONSULT LETTER
[Dear  ___] : Dear  [unfilled], [Consult Letter:] : I had the pleasure of evaluating your patient, [unfilled]. [Please see my note below.] : Please see my note below. [Sincerely,] : Sincerely, [FreeTextEntry3] : Elvis Rondon DO\par Attending Physician,\par Hematology/ Medical Oncology\par [phone] office\par \par

## 2021-06-11 NOTE — PHYSICAL EXAM
[Restricted in physically strenuous activity but ambulatory and able to carry out work of a light or sedentary nature] : Status 1- Restricted in physically strenuous activity but ambulatory and able to carry out work of a light or sedentary nature, e.g., light house work, office work [Obese] : obese [Normal] : affect appropriate [de-identified] : no bleed  [de-identified] : no mucocutaneous bleed

## 2021-06-14 ENCOUNTER — RESULT REVIEW (OUTPATIENT)
Age: 66
End: 2021-06-14

## 2021-06-14 ENCOUNTER — OUTPATIENT (OUTPATIENT)
Dept: OUTPATIENT SERVICES | Facility: HOSPITAL | Age: 66
LOS: 1 days | Discharge: HOME | End: 2021-06-14
Payer: MEDICARE

## 2021-06-14 DIAGNOSIS — Z98.890 OTHER SPECIFIED POSTPROCEDURAL STATES: Chronic | ICD-10-CM

## 2021-06-14 PROCEDURE — 88311 DECALCIFY TISSUE: CPT | Mod: 26

## 2021-06-14 PROCEDURE — 88342 IMHCHEM/IMCYTCHM 1ST ANTB: CPT | Mod: 26,59

## 2021-06-14 PROCEDURE — 88305 TISSUE EXAM BY PATHOLOGIST: CPT | Mod: 26

## 2021-06-14 PROCEDURE — 38222 DX BONE MARROW BX & ASPIR: CPT | Mod: LT

## 2021-06-14 PROCEDURE — 88313 SPECIAL STAINS GROUP 2: CPT | Mod: 26

## 2021-06-14 PROCEDURE — 88341 IMHCHEM/IMCYTCHM EA ADD ANTB: CPT | Mod: 26,59

## 2021-06-14 PROCEDURE — 88189 FLOWCYTOMETRY/READ 16 & >: CPT

## 2021-06-14 PROCEDURE — 77012 CT SCAN FOR NEEDLE BIOPSY: CPT | Mod: 26

## 2021-06-14 NOTE — PROCEDURE NOTE - PROCEDURE FINDINGS AND DETAILS
Successful bone marrow biopsy with 11G bone core and marrow aspirate submitted for analysis. Intraprocedural hemonc consultation appreciated. Procedure well-tolerated and no immediate complicatinos.

## 2021-06-15 ENCOUNTER — APPOINTMENT (OUTPATIENT)
Dept: INFUSION THERAPY | Facility: CLINIC | Age: 66
End: 2021-06-15
Payer: MEDICARE

## 2021-06-15 ENCOUNTER — APPOINTMENT (OUTPATIENT)
Dept: HEMATOLOGY ONCOLOGY | Facility: CLINIC | Age: 66
End: 2021-06-15
Payer: MEDICARE

## 2021-06-15 ENCOUNTER — LABORATORY RESULT (OUTPATIENT)
Age: 66
End: 2021-06-15

## 2021-06-15 VITALS
BODY MASS INDEX: 45.48 KG/M2 | WEIGHT: 283 LBS | TEMPERATURE: 98.4 F | HEART RATE: 80 BPM | HEIGHT: 66 IN | DIASTOLIC BLOOD PRESSURE: 74 MMHG | SYSTOLIC BLOOD PRESSURE: 143 MMHG

## 2021-06-15 LAB
HCT VFR BLD CALC: 42.7 %
HGB BLD-MCNC: 13.3 G/DL
MCHC RBC-ENTMCNC: 24.7 PG
MCHC RBC-ENTMCNC: 31.1 G/DL
MCV RBC AUTO: 79.2 FL
PLATELET # BLD AUTO: 237 K/UL
PMV BLD: 11.1 FL
RBC # BLD: 5.39 M/UL
RBC # FLD: 20.9 %
WBC # FLD AUTO: 8.56 K/UL

## 2021-06-15 PROCEDURE — 99215 OFFICE O/P EST HI 40 MIN: CPT

## 2021-06-15 RX ORDER — ROMIPLOSTIM 250 UG/.5ML
260 INJECTION, POWDER, LYOPHILIZED, FOR SOLUTION SUBCUTANEOUS ONCE
Refills: 0 | Status: COMPLETED | OUTPATIENT
Start: 2021-06-15 | End: 2021-06-15

## 2021-06-15 RX ADMIN — ROMIPLOSTIM 260 MICROGRAM(S): 250 INJECTION, POWDER, LYOPHILIZED, FOR SOLUTION SUBCUTANEOUS at 14:18

## 2021-06-15 NOTE — ASSESSMENT
[FreeTextEntry1] : 67 yo with thrombocytopenia; 5K noted on . Review of the trend revelaed that pt has isolated thrombocytopenia of unclear etiology, that has started several months ago, exacerbated in winter 2021 (counts have decreased from 100K/ 2020 to 50K/ 2021 to 5k/ May 2021). Hamlet completed both doses of  MODERNA several weeks prior to the hospital presentation. \par \par Peripheral smear did not note schistocytes, spherocytes, aacnthocytes, polychromasia; no dysplastic immauture cells; labs did not reveal evidence of hemolysis\par \par Differential diagnosis includes;\par - idiopathic ITP\par - modernal caused ITP\par - NHL caused ITP\par - CTD associated ITP (ANA1:360)\par - MDS\par \par started on IV IG 1G/kg daily x2 (21-21) and on prednisone 1mg/kg beginning 21. He had a good response to IVIG 1 G/KG X2 and prednisone 1mg/kg with normalization of platelets count; upon slow tapering of steroids, platelets count nadired to 12K: he was re-challenged with iVIG 1G/KG x2 and prednisone dose was increased back to 1mg/kg: the effect is humble since platelets count has not normalized but decreased to 30sK\par \par Platelets counts improved to 116K (21) and \par 133K (21), when the dose of prednisone was decreased from 130mg to 100mg daily: \par 21: 28K , and dose of prednisone was increased back up to 130mg daily\par 21: 12K (started IVIG x 2 days and continued prednisone 130mg daily)\par 21: 51K\par 21: 35K\par 21: 16K\par 21: 7K (started romiplastim)\par 21:  29 K\par 21: 138K\par 21:  163K\par 6/15/21: 237K\par \par IMPRESSION; REFRACTORY ITP\par PLAN:\par - pt appears distressed about his condition and is afraid it is life threatening; reassured the patient that we will keep a close look on him\par - concerned if this is refractory disease ; RECOMMEND TO CHANGE THE TREATMENT PLAN; \par - since his disease was refractory to sterids, recommended to change the treatement:\par a) ontinuing steroids (prednisone 1mg/kg daily) , \par b) he was started on rituxian/CD20 monoclonal antibody weekly x4: first weekly dose 21 outpatient\par c) NPLATE ws initiated on 21\par \par - rheum eval in view of +HORACIO\par \par - s/p IVIG 1G/KG daily x 2 as of 21\par - CT CAP IVC to r/o NHL, scheduled for \par - - GIVEN the age of onset of isolated thrombocytopenia, s/p MDSBone marrow bx performed \par \par  21: His platelet count decreased to 7K. \par PBS  reviewed; 0-1 platelets on most of the 100x HPF; no schistocytes  no spherocytes; no polychromasia; no acanthocytes\par He started on Rituxan . We started romiplastim , and his platelets improved to 29K and then 138K. Will continue with Nplate 2mcg/kg weekly to increase platelets and decrease bleeding risk. \par \par A) We will taper my 1mcg/kg if he has 2 consecutive platelet counts over 150-200K.\par B)  If platelet count < 50K then will increase by 1mcg/kg.  \par C) started Nplate on 21, WEEKLY (while waiting for improvement from Rituxan).  \par  \par advised if any subtle signs of CVA / mucocutaneous bleed, immediately call 911 and get to ER\par \par - He was on prednisone 130mg daily, tapered to 110mg on , will taper to 90mg QD starting \par - Continue Nplate 2mcg/kg , dose 3 on 6.15.21, weekly; if platelets count remains normal and stable within 200s on 21 - will taper\par -  last weekly  dose ( dose 4) of rituxan on 21\par - Bone marrow biopsy with IR \par - CT CAP 21: will request PA /NP team to follow\par \par Microcytic anemia: Retic is 2.7%, Juan is positive with IgG+, C3-, LDH wnl, hapto wnl, TB 1.4, TSAT 12% with ferritin 55\par \par Plan:\par Continue NPlate, if on  platelet > 200K decrease to 1mcg/k\par This coming Friday is dose 4 of rituxan, last dose\par Taper prednisone to 90mg starting , will continue to taper prednisone 10-20mg every 4-5 days\par He had bone marrow performed , will f/u results\par  He has CT scan this \par \par RTC  and \par \par seen/examined w/ Kale Terry, DO\par \par 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 \par He responded well to rituxan and NPLATE, that were initiated in addtion to high dose steroids:\par a) predinisone was tapered frorm 1mg/kg (130mg total daily dose ) to 90 mg (beginning 21: tapering should be every 4-5 days by 20mg from the last daily dose\par b) rituxan : completes the 4th weekly dose on 21\par c) NPLATE: inititated at 2 mcg/k/8-6/15/21: platelets count normalized and has remained so; if counts are normal and stable for 2 weeks -21, start tapering NPLATE to 1 mcg/kg\par \par BONE MARROW BX done : spoke to Dr Griffin: result pending\par CT CAP to r/o NHL \par \par pt to be followed TWICE A WEEK\par Tue: [facility]\par FridAY: NP/PA clinic

## 2021-06-15 NOTE — HISTORY OF PRESENT ILLNESS
[Therapy: ___] : Therapy: [unfilled] [Cycle: ___] : Cycle: [unfilled] [de-identified] : Mr. Dalal is a 66 year old male with PMH of DM, HTN, DLD, MAICOL who was recently admitted for suspected ITP. Patient was noted to have asymptomatic thrombocytopenia as an outpatient and referred to the ED. In the ED his platelet count was 5K with normal WBC and Hg. His smear was reviewed and showed 1-2 schistocytes, no clumping, large platelets, decrease number of platelets, normal WBC morphology/no blasts, some target cells. He was started on prednisone 1 mg/kg of prednisone, and he received two doses of IVIG. His platelets improved to 38 and he was discharged on prednisone. \par Of note, his platelets were 106 in June 2020, ad then 53 in January 2021. Also in January and then in February he received the Moderna vaccine. He does have high RBC count (6.45) and low MCV (73).\par Workup in the hospital showed normal US spleen, no evidence of hemolysis, normal LDH, hapto and ret count, HIV, HepB, HepC EBV IgM, CMV IgM NR , HIT negative , RF negative, peripheral flow negative\par \par Patient informed us he was not discharged with needles for his insulin. His fingersticks have in the low 200s. \par His platelets today are 116. No bleeding, bruising or petechiae. \par \par 5/14/2021- pt returns for follow up today.  feeling well.  some mild bruising where labs drawn otherwise no other bruising.  wbc 10.49 hgb 13.5 hct 23.0 platelets now 133. \par pt made f/up appt with new fer Doss for this monday 5/17/2021. P t checking FS at home sugar 130 today.   [FreeTextEntry1] : Started weekly Ritxuan on 5.28.2021 [de-identified] : 5/18/21: Mr. Dalal returns for followup. Since Saturday he has been taking 100mg prednsone daily, he was on 130mg prior to this. No evidence of bleeding, petechiae. His platelets dropped to 28K. We reviewed the smear and there was no clumping, 2-3 platelets per HPF. We told him to take an additional 30mg today.\par \par 5/21/21\par Patient is here for a follow-up visit for immune thrombocytopenia.  He is feeling well with no new complaints.  Reviewed most recent CBC, which shows PLTs are down to 12,000 from 28,000 earlier this week.  His PLT dropped even with increase of prednisone back up to 130mg daily.  Patient denies easier bruising or bleeding.  \par \par 5/26/21\par Patient is here for a follow-up visit for immune thrombocytopenia.  He is feeling well with no new complaints.  Patient denies easier bruising or bleeding.  Since last visit, he was sent to ER for complete workup and received IVIG x 2 days.  During hospital stay, we offered BMBx and to start Rituxan, but patient deferred at that time.  He has been taking Prednisone 130mg daily.  Reviewed most recent CBC which shows PLTs are down to 35,000 from 51,000 earlier this week.  \par \par 5/28/2021- Pt  is here for a follow-up visit for immune thrombocytopenia.  He is feeling well with no new complaints.  Patient denies easier bruising or bleeding.  No Headache no diziness no weakness.   pt was continued on prednisone.  Since last visit, his platelets went from  35,000 to 15,000\par ITP  refractory to current treatment; Discussed risks and benefits of Rituxan including side effects such as fatigue, diarrhea, rashes, hepatitis B reactivation, allergic reactions and death to name a few. Consent obtained.  \par \par 6/1/2021: Patient presents for followup. He started rituxan Friday, 5/28 after decreasing even with increase in steroids. His direct emelia is positive, IgG+, C3 negative.  Hg 13.0, retic is 2.7%. His platelets dropped to 7K. WIll start Romiplastin today.  \par \par 6/4/2021- Patient returns for follow up today for Immune Thrombocytopenia.  Pt was seen on Tuesday 6/1/2021 and Platelets were 7K.  Pt was started on Romiplastin.  He is feeling well.  Denies any bleeding no fatigue no rashes.  CBC done today Platelets now 29K. Pt remains on prednisone no swelling no insomnia. Pt has been trying to walk feeling well, loosing weight.  \par \par 6/8/2021: Mr Dalal returns for followup of ITP. His platelets are now 145. He was started on romiplastim last week and he is continuing with rituxan weekly and prednisone.  He denies bleeding, petechiae, rash, melena, BRBPR\par \par 6/11/21\par Pt is here today for follow up visit for ITP.  His platelets improved, now 163. He was started on romiplastim 6/1/21 and he is continuing with rituxan weekly and prednisone.  He denies bleeding, petechiae, rash, melena, BRBPR.  He is scheduled for bone marrow bx on 6/14/21 at IR and CT scan C/A/P with contrast on 6/17/21.\par \par

## 2021-06-15 NOTE — PHYSICAL EXAM
[Restricted in physically strenuous activity but ambulatory and able to carry out work of a light or sedentary nature] : Status 1- Restricted in physically strenuous activity but ambulatory and able to carry out work of a light or sedentary nature, e.g., light house work, office work [Obese] : obese [Normal] : affect appropriate [de-identified] : no bleed  [de-identified] : no mucocutaneous bleed

## 2021-06-15 NOTE — CONSULT LETTER
[Dear  ___] : Dear  [unfilled], [Consult Letter:] : I had the pleasure of evaluating your patient, [unfilled]. [Please see my note below.] : Please see my note below. [Sincerely,] : Sincerely, [FreeTextEntry3] : Elvis Rondon DO\par Attending Physician,\par Hematology/ Medical Oncology\par [phone] office\par \par

## 2021-06-16 LAB — TM INTERPRETATION: SIGNIFICANT CHANGE UP

## 2021-06-17 ENCOUNTER — RESULT REVIEW (OUTPATIENT)
Age: 66
End: 2021-06-17

## 2021-06-17 ENCOUNTER — OUTPATIENT (OUTPATIENT)
Dept: OUTPATIENT SERVICES | Facility: HOSPITAL | Age: 66
LOS: 1 days | Discharge: HOME | End: 2021-06-17
Payer: MEDICARE

## 2021-06-17 DIAGNOSIS — Z98.890 OTHER SPECIFIED POSTPROCEDURAL STATES: Chronic | ICD-10-CM

## 2021-06-17 DIAGNOSIS — D69.3 IMMUNE THROMBOCYTOPENIC PURPURA: ICD-10-CM

## 2021-06-17 PROCEDURE — 71260 CT THORAX DX C+: CPT | Mod: 26,MH

## 2021-06-17 PROCEDURE — 74177 CT ABD & PELVIS W/CONTRAST: CPT | Mod: 26,MH

## 2021-06-18 ENCOUNTER — LABORATORY RESULT (OUTPATIENT)
Age: 66
End: 2021-06-18

## 2021-06-18 ENCOUNTER — APPOINTMENT (OUTPATIENT)
Dept: HEMATOLOGY ONCOLOGY | Facility: CLINIC | Age: 66
End: 2021-06-18
Payer: MEDICARE

## 2021-06-18 ENCOUNTER — APPOINTMENT (OUTPATIENT)
Dept: INFUSION THERAPY | Facility: CLINIC | Age: 66
End: 2021-06-18
Payer: MEDICARE

## 2021-06-18 VITALS
DIASTOLIC BLOOD PRESSURE: 72 MMHG | SYSTOLIC BLOOD PRESSURE: 117 MMHG | RESPIRATION RATE: 16 BRPM | HEIGHT: 66 IN | BODY MASS INDEX: 45.64 KG/M2 | WEIGHT: 284 LBS | HEART RATE: 85 BPM | TEMPERATURE: 98.4 F

## 2021-06-18 DIAGNOSIS — D69.3 IMMUNE THROMBOCYTOPENIC PURPURA: ICD-10-CM

## 2021-06-18 DIAGNOSIS — Z51.81 ENCOUNTER FOR THERAPEUTIC DRUG LVL MONITORING: ICD-10-CM

## 2021-06-18 DIAGNOSIS — Z79.899 ENCOUNTER FOR THERAPEUTIC DRUG LVL MONITORING: ICD-10-CM

## 2021-06-18 DIAGNOSIS — E11.9 TYPE 2 DIABETES MELLITUS WITHOUT COMPLICATIONS: ICD-10-CM

## 2021-06-18 DIAGNOSIS — Z79.84 LONG TERM (CURRENT) USE OF ORAL HYPOGLYCEMIC DRUGS: ICD-10-CM

## 2021-06-18 DIAGNOSIS — Z79.82 LONG TERM (CURRENT) USE OF ASPIRIN: ICD-10-CM

## 2021-06-18 DIAGNOSIS — I10 ESSENTIAL (PRIMARY) HYPERTENSION: ICD-10-CM

## 2021-06-18 DIAGNOSIS — E78.00 PURE HYPERCHOLESTEROLEMIA, UNSPECIFIED: ICD-10-CM

## 2021-06-18 PROCEDURE — 99213 OFFICE O/P EST LOW 20 MIN: CPT

## 2021-06-18 RX ORDER — DIPHENHYDRAMINE HCL 50 MG
50 CAPSULE ORAL ONCE
Refills: 0 | Status: COMPLETED | OUTPATIENT
Start: 2021-06-18 | End: 2021-06-18

## 2021-06-18 RX ORDER — DEXAMETHASONE 0.5 MG/5ML
4 ELIXIR ORAL ONCE
Refills: 0 | Status: COMPLETED | OUTPATIENT
Start: 2021-06-18 | End: 2021-06-18

## 2021-06-18 RX ORDER — FAMOTIDINE 10 MG/ML
20 INJECTION INTRAVENOUS ONCE
Refills: 0 | Status: COMPLETED | OUTPATIENT
Start: 2021-06-18 | End: 2021-06-18

## 2021-06-18 RX ORDER — RITUXIMAB 10 MG/ML
865 INJECTION, SOLUTION INTRAVENOUS ONCE
Refills: 0 | Status: COMPLETED | OUTPATIENT
Start: 2021-06-18 | End: 2021-06-18

## 2021-06-18 RX ORDER — ACETAMINOPHEN 500 MG
650 TABLET ORAL ONCE
Refills: 0 | Status: COMPLETED | OUTPATIENT
Start: 2021-06-18 | End: 2021-06-18

## 2021-06-18 RX ADMIN — FAMOTIDINE 20 MILLIGRAM(S): 10 INJECTION INTRAVENOUS at 12:10

## 2021-06-18 RX ADMIN — Medication 50 MILLIGRAM(S): at 12:30

## 2021-06-18 RX ADMIN — RITUXIMAB 173 MILLIGRAM(S): 10 INJECTION, SOLUTION INTRAVENOUS at 11:53

## 2021-06-18 RX ADMIN — Medication 650 MILLIGRAM(S): at 11:31

## 2021-06-18 RX ADMIN — Medication 650 MILLIGRAM(S): at 11:30

## 2021-06-18 RX ADMIN — Medication 102 MILLIGRAM(S): at 11:30

## 2021-06-18 RX ADMIN — Medication 4 MILLIGRAM(S): at 11:50

## 2021-06-18 RX ADMIN — FAMOTIDINE 104 MILLIGRAM(S): 10 INJECTION INTRAVENOUS at 11:50

## 2021-06-18 RX ADMIN — Medication 102 MILLIGRAM(S): at 12:10

## 2021-06-18 NOTE — ASSESSMENT
[FreeTextEntry1] : 67 yo with thrombocytopenia; 5K noted on . Review of the trend revelaed that pt has isolated thrombocytopenia of unclear etiology, that has started several months ago, exacerbated in winter 2021 (counts have decreased from 100K/ 2020 to 50K/ 2021 to 5k/ May 2021). Hamlet completed both doses of  MODERNA several weeks prior to the hospital presentation. \par \par Peripheral smear did not note schistocytes, spherocytes, aacnthocytes, polychromasia; no dysplastic immauture cells; labs did not reveal evidence of hemolysis\par \par Differential diagnosis includes;\par - idiopathic ITP\par - modernal caused ITP\par - NHL caused ITP\par - CTD associated ITP (ANA1:360)\par - MDS\par \par started on IV IG 1G/kg daily x2 (21-21) and on prednisone 1mg/kg beginning 21. He had a good response to IVIG 1 G/KG X2 and prednisone 1mg/kg with normalization of platelets count; upon slow tapering of steroids, platelets count nadired to 12K: he was re-challenged with iVIG 1G/KG x2 and prednisone dose was increased back to 1mg/kg: the effect is humble since platelets count has not normalized but decreased to 30sK\par \par Platelets counts improved to 116K (21) and \par 133K (21), when the dose of prednisone was decreased from 130mg to 100mg daily: \par 21: 28K , and dose of prednisone was increased back up to 130mg daily\par 21: 12K (started IVIG x 2 days and continued prednisone 130mg daily)\par 21: 51K\par 21: 35K\par 21: 16K\par 21: 7K (started romiplastim)\par 21:  29 K\par 21: 138K\par 21:  163K\par 6/15/21: 237K\par \par IMPRESSION; REFRACTORY ITP\par PLAN:\par - pt appears distressed about his condition and is afraid it is life threatening; reassured the patient that we will keep a close look on him\par - concerned if this is refractory disease ; RECOMMEND TO CHANGE THE TREATMENT PLAN; \par - since his disease was refractory to sterids, recommended to change the treatement:\par a) ontinuing steroids (prednisone 1mg/kg daily) , \par b) he was started on rituxian/CD20 monoclonal antibody weekly x4: first weekly dose 21 outpatient\par c) NPLATE ws initiated on 21\par \par - rheum eval in view of +HORACIO\par \par - s/p IVIG 1G/KG daily x 2 as of 21\par - CT CAP IVC to r/o NHL, scheduled for \par - - GIVEN the age of onset of isolated thrombocytopenia, s/p MDSBone marrow bx performed \par \par  21: His platelet count decreased to 7K. \par PBS  reviewed; 0-1 platelets on most of the 100x HPF; no schistocytes  no spherocytes; no polychromasia; no acanthocytes\par He started on Rituxan . We started romiplastim , and his platelets improved to 29K and then 138K. Will continue with Nplate 2mcg/kg weekly to increase platelets and decrease bleeding risk. \par \par A) We will taper my 1mcg/kg if he has 2 consecutive platelet counts over 150-200K.\par B)  If platelet count < 50K then will increase by 1mcg/kg.  \par C) started Nplate on 21, WEEKLY (while waiting for improvement from Rituxan).  \par  \par advised if any subtle signs of CVA / mucocutaneous bleed, immediately call 911 and get to ER\par \par - He was on prednisone 130mg daily, tapered to 110mg on , will taper to 90mg QD starting \par - Continue Nplate 2mcg/kg , dose 3 on 6.15.21, weekly; if platelets count remains normal and stable within 200s on 21 - will taper\par -  last weekly  dose ( dose 4) of rituxan on 21\par - Bone marrow biopsy with IR - result pending.\par - CT CAP 21: result still pending.\par \par Microcytic anemia: Retic is 2.7%, Juan is positive with IgG+, C3-, LDH wnl, hapto wnl, TB 1.4, TSAT 12% with ferritin 55\par \par Plan:\par Continue NPlate, if on  platelet > 200K decrease to 1mcg/k\par  Ordered dose 4 of Rituxan, last dose today\par Taper prednisone from 90mg PO daily to 70 mg Po daily starting 21.\par He had bone marrow performed , will f/u results\par  He has CT scan this - result pending- will f/u results.\par \par RTC  with Dr. Terry.\par Case was discussed with Dr. Rondon previously. 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 \par He responded well to rituxan and NPLATE, that were initiated in addtion to high dose steroids:\par a) predinisone was tapered frorm 1mg/kg (130mg total daily dose ) to 90 mg (beginning 21: tapering should be every 4-5 days by 20mg from the last daily dose\par b) rituxan : completes the 4th weekly dose on 21\par c) NPLATE: inititated at 2 mcg/k/8-6/15/21: platelets count normalized and has remained so; if counts are normal and stable for 2 weeks -21, start tapering NPLATE to 1 mcg/kg\par \par BONE MARROW BX done : spoke to Dr Griffin: result pending\par CT CAP to r/o NHL \par \par pt to be followed TWICE A WEEK\par Tue: [facility]\par FridAY: NP/PA clinic

## 2021-06-18 NOTE — HISTORY OF PRESENT ILLNESS
[Therapy: ___] : Therapy: [unfilled] [Cycle: ___] : Cycle: [unfilled] [de-identified] : Mr. Dalal is a 66 year old male with PMH of DM, HTN, DLD, MAICOL who was recently admitted for suspected ITP. Patient was noted to have asymptomatic thrombocytopenia as an outpatient and referred to the ED. In the ED his platelet count was 5K with normal WBC and Hg. His smear was reviewed and showed 1-2 schistocytes, no clumping, large platelets, decrease number of platelets, normal WBC morphology/no blasts, some target cells. He was started on prednisone 1 mg/kg of prednisone, and he received two doses of IVIG. His platelets improved to 38 and he was discharged on prednisone. \par Of note, his platelets were 106 in June 2020, ad then 53 in January 2021. Also in January and then in February he received the Moderna vaccine. He does have high RBC count (6.45) and low MCV (73).\par Workup in the hospital showed normal US spleen, no evidence of hemolysis, normal LDH, hapto and ret count, HIV, HepB, HepC EBV IgM, CMV IgM NR , HIT negative , RF negative, peripheral flow negative\par \par Patient informed us he was not discharged with needles for his insulin. His fingersticks have in the low 200s. \par His platelets today are 116. No bleeding, bruising or petechiae. \par \par 5/14/2021- pt returns for follow up today.  feeling well.  some mild bruising where labs drawn otherwise no other bruising.  wbc 10.49 hgb 13.5 hct 23.0 platelets now 133. \par pt made f/up appt with new fer Doss for this monday 5/17/2021. P t checking FS at home sugar 130 today.   [FreeTextEntry1] : Started weekly Ritxuan on 5.28.2021 [de-identified] : 5/18/21: Mr. Dalal returns for followup. Since Saturday he has been taking 100mg prednsone daily, he was on 130mg prior to this. No evidence of bleeding, petechiae. His platelets dropped to 28K. We reviewed the smear and there was no clumping, 2-3 platelets per HPF. We told him to take an additional 30mg today.\par \par 5/21/21\par Patient is here for a follow-up visit for immune thrombocytopenia.  He is feeling well with no new complaints.  Reviewed most recent CBC, which shows PLTs are down to 12,000 from 28,000 earlier this week.  His PLT dropped even with increase of prednisone back up to 130mg daily.  Patient denies easier bruising or bleeding.  \par \par 5/26/21\par Patient is here for a follow-up visit for immune thrombocytopenia.  He is feeling well with no new complaints.  Patient denies easier bruising or bleeding.  Since last visit, he was sent to ER for complete workup and received IVIG x 2 days.  During hospital stay, we offered BMBx and to start Rituxan, but patient deferred at that time.  He has been taking Prednisone 130mg daily.  Reviewed most recent CBC which shows PLTs are down to 35,000 from 51,000 earlier this week.  \par \par 5/28/2021- Pt  is here for a follow-up visit for immune thrombocytopenia.  He is feeling well with no new complaints.  Patient denies easier bruising or bleeding.  No Headache no diziness no weakness.   pt was continued on prednisone.  Since last visit, his platelets went from  35,000 to 15,000\par ITP  refractory to current treatment; Discussed risks and benefits of Rituxan including side effects such as fatigue, diarrhea, rashes, hepatitis B reactivation, allergic reactions and death to name a few. Consent obtained.  \par \par 6/1/2021: Patient presents for followup. He started rituxan Friday, 5/28 after decreasing even with increase in steroids. His direct emelia is positive, IgG+, C3 negative.  Hg 13.0, retic is 2.7%. His platelets dropped to 7K. WIll start Romiplastin today.  \par \par 6/4/2021- Patient returns for follow up today for Immune Thrombocytopenia.  Pt was seen on Tuesday 6/1/2021 and Platelets were 7K.  Pt was started on Romiplastin.  He is feeling well.  Denies any bleeding no fatigue no rashes.  CBC done today Platelets now 29K. Pt remains on prednisone no swelling no insomnia. Pt has been trying to walk feeling well, loosing weight.  \par \par 6/8/2021: Mr Dalal returns for followup of ITP. His platelets are now 145. He was started on romiplastim last week and he is continuing with rituxan weekly and prednisone.  He denies bleeding, petechiae, rash, melena, BRBPR\par \par 6/11/21\par Pt is here today for follow up visit for ITP.  His platelets improved, now 163. He was started on romiplastim 6/1/21 and he is continuing with rituxan weekly and prednisone.  He denies bleeding, petechiae, rash, melena, BRBPR.  He is scheduled for bone marrow bx on 6/14/21 at IR and CT scan C/A/P with contrast on 6/17/21.\par \par 6/18/21\par Pt is here today for follow up visit for ITP.  His platelets improved, now 308 from 237, 163) He was started on romiplastim 6/1/21 and he is continuing with rituxan weekly and prednisone. Today is his 4th Rituxan.  He is taking Prednisone 90 mg PO daily. He denies bleeding, petechiae, rash, melena, BRBPR.  \par Bone marrow bx was done at IR on 6/14/21- result pending.  CT scan C/A/P with contrast done yesterday- result pending.

## 2021-06-18 NOTE — PHYSICAL EXAM
[Restricted in physically strenuous activity but ambulatory and able to carry out work of a light or sedentary nature] : Status 1- Restricted in physically strenuous activity but ambulatory and able to carry out work of a light or sedentary nature, e.g., light house work, office work [Obese] : obese [Normal] : affect appropriate [de-identified] : no bleed  [de-identified] : no mucocutaneous bleed

## 2021-06-18 NOTE — CONSULT LETTER
[Dear  ___] : Dear  [unfilled], [Consult Letter:] : I had the pleasure of evaluating your patient, [unfilled]. [Please see my note below.] : Please see my note below. [Sincerely,] : Sincerely, [FreeTextEntry3] : Elvis Rondon DO\par Attending Physician,\par Hematology/ Medical Oncology\par [phone] office\par \par

## 2021-06-21 LAB — HEMATOPATHOLOGY REPORT: SIGNIFICANT CHANGE UP

## 2021-06-22 ENCOUNTER — APPOINTMENT (OUTPATIENT)
Dept: HEMATOLOGY ONCOLOGY | Facility: CLINIC | Age: 66
End: 2021-06-22
Payer: MEDICARE

## 2021-06-22 ENCOUNTER — APPOINTMENT (OUTPATIENT)
Dept: INFUSION THERAPY | Facility: CLINIC | Age: 66
End: 2021-06-22
Payer: MEDICARE

## 2021-06-22 ENCOUNTER — LABORATORY RESULT (OUTPATIENT)
Age: 66
End: 2021-06-22

## 2021-06-22 VITALS
DIASTOLIC BLOOD PRESSURE: 75 MMHG | TEMPERATURE: 98.4 F | WEIGHT: 282 LBS | HEIGHT: 66 IN | SYSTOLIC BLOOD PRESSURE: 111 MMHG | BODY MASS INDEX: 45.32 KG/M2 | RESPIRATION RATE: 16 BRPM | HEART RATE: 90 BPM

## 2021-06-22 PROCEDURE — 99213 OFFICE O/P EST LOW 20 MIN: CPT

## 2021-06-22 RX ORDER — ROMIPLOSTIM 250 UG/.5ML
385 INJECTION, POWDER, LYOPHILIZED, FOR SOLUTION SUBCUTANEOUS ONCE
Refills: 0 | Status: COMPLETED | OUTPATIENT
Start: 2021-06-22 | End: 2021-06-22

## 2021-06-22 RX ADMIN — ROMIPLOSTIM 385 MICROGRAM(S): 250 INJECTION, POWDER, LYOPHILIZED, FOR SOLUTION SUBCUTANEOUS at 14:33

## 2021-06-23 NOTE — HISTORY OF PRESENT ILLNESS
[Therapy: ___] : Therapy: [unfilled] [Cycle: ___] : Cycle: [unfilled] [de-identified] : Mr. Dalal is a 66 year old male with PMH of DM, HTN, DLD, MAICOL who was recently admitted for suspected ITP. Patient was noted to have asymptomatic thrombocytopenia as an outpatient and referred to the ED. In the ED his platelet count was 5K with normal WBC and Hg. His smear was reviewed and showed 1-2 schistocytes, no clumping, large platelets, decrease number of platelets, normal WBC morphology/no blasts, some target cells. He was started on prednisone 1 mg/kg of prednisone, and he received two doses of IVIG. His platelets improved to 38 and he was discharged on prednisone. \par Of note, his platelets were 106 in June 2020, ad then 53 in January 2021. Also in January and then in February he received the Moderna vaccine. He does have high RBC count (6.45) and low MCV (73).\par Workup in the hospital showed normal US spleen, no evidence of hemolysis, normal LDH, hapto and ret count, HIV, HepB, HepC EBV IgM, CMV IgM NR , HIT negative , RF negative, peripheral flow negative\par \par Patient informed us he was not discharged with needles for his insulin. His fingersticks have in the low 200s. \par His platelets today are 116. No bleeding, bruising or petechiae. \par \par 5/14/2021- pt returns for follow up today.  feeling well.  some mild bruising where labs drawn otherwise no other bruising.  wbc 10.49 hgb 13.5 hct 23.0 platelets now 133. \par pt made f/up appt with new fer Doss for this monday 5/17/2021. P t checking FS at home sugar 130 today.   [FreeTextEntry1] : Started weekly Ritxuan on 5.28.2021 [de-identified] : 5/18/21: Mr. Dalal returns for followup. Since Saturday he has been taking 100mg prednsone daily, he was on 130mg prior to this. No evidence of bleeding, petechiae. His platelets dropped to 28K. We reviewed the smear and there was no clumping, 2-3 platelets per HPF. We told him to take an additional 30mg today.\par \par 5/21/21\par Patient is here for a follow-up visit for immune thrombocytopenia.  He is feeling well with no new complaints.  Reviewed most recent CBC, which shows PLTs are down to 12,000 from 28,000 earlier this week.  His PLT dropped even with increase of prednisone back up to 130mg daily.  Patient denies easier bruising or bleeding.  \par \par 5/26/21\par Patient is here for a follow-up visit for immune thrombocytopenia.  He is feeling well with no new complaints.  Patient denies easier bruising or bleeding.  Since last visit, he was sent to ER for complete workup and received IVIG x 2 days.  During hospital stay, we offered BMBx and to start Rituxan, but patient deferred at that time.  He has been taking Prednisone 130mg daily.  Reviewed most recent CBC which shows PLTs are down to 35,000 from 51,000 earlier this week.  \par \par 5/28/2021- Pt  is here for a follow-up visit for immune thrombocytopenia.  He is feeling well with no new complaints.  Patient denies easier bruising or bleeding.  No Headache no diziness no weakness.   pt was continued on prednisone.  Since last visit, his platelets went from  35,000 to 15,000\par ITP  refractory to current treatment; Discussed risks and benefits of Rituxan including side effects such as fatigue, diarrhea, rashes, hepatitis B reactivation, allergic reactions and death to name a few. Consent obtained.  \par \par 6/1/2021: Patient presents for followup. He started rituxan Friday, 5/28 after decreasing even with increase in steroids. His direct emelia is positive, IgG+, C3 negative.  Hg 13.0, retic is 2.7%. His platelets dropped to 7K. WIll start Romiplastin today.  \par \par 6/4/2021- Patient returns for follow up today for Immune Thrombocytopenia.  Pt was seen on Tuesday 6/1/2021 and Platelets were 7K.  Pt was started on Romiplastin.  He is feeling well.  Denies any bleeding no fatigue no rashes.  CBC done today Platelets now 29K. Pt remains on prednisone no swelling no insomnia. Pt has been trying to walk feeling well, loosing weight.  \par \par 6/8/2021: Mr Dalal returns for followup of ITP. His platelets are now 145. He was started on romiplastim last week and he is continuing with rituxan weekly and prednisone.  He denies bleeding, petechiae, rash, melena, BRBPR\par \par 6/11/21\par Pt is here today for follow up visit for ITP.  His platelets improved, now 163. He was started on romiplastim 6/1/21 and he is continuing with rituxan weekly and prednisone.  He denies bleeding, petechiae, rash, melena, BRBPR.  He is scheduled for bone marrow bx on 6/14/21 at IR and CT scan C/A/P with contrast on 6/17/21.\par \par 6/18/21\par Pt is here today for follow up visit for ITP.  His platelets improved, now 308 from 237, 163) He was started on romiplastim 6/1/21 and he is continuing with rituxan weekly and prednisone. Today is his 4th Rituxan.  He is taking Prednisone 90 mg PO daily. He denies bleeding, petechiae, rash, melena, BRBPR.  \par Bone marrow bx was done at IR on 6/14/21- result pending.  CT scan C/A/P with contrast done yesterday- result pending.\par \par 6/22/21:\par Completed rituxan on 6/18. Tapered to 70mg from 90mg since Saturday . Platelts today are 145, platelets were 340 on Friday. We will increase Nplate to 3mcg/kg and continue to taper steroids. \par \par CT Chest 6/17\par IMPRESSION:\par Decreased size of bilateral axillary lymph nodes now measuring up to 1.3 cm on the left. No mediastinal or hilar lymphadenopathy.\par No CT evidence of an acute intrathoracic pathological process.\par Evidence of multivessel coronary arterial disease.\par Please see separately dictated CT abdomen and pelvis for additional findings.\par \par IMPRESSION:\par Mildly enlarged right retrocrural lymph nodes measuring up to 1.2 cm.\par Infiltrative changes within the retroperitoneum at the level of the aortic bifurcation.\par Above findings are nonspecific, but lymphomatous disease is not excluded\par Indeterminate left upper pole renal lesion with questionable mural nodule. Follow-up CT renal mass protocol is recommended in 3 months to demonstrate stability\par \par DIscussed that were non-specific findings on CT CAP, to be further discussed with his primary hematologist. \par \par BONE MARROW BX done 6/14TH: Pathology recommended repeat biopsys; Molecular analysis pending, cytogenetics pending, FISH pending, \par

## 2021-06-23 NOTE — ASSESSMENT
[FreeTextEntry1] : 65 yo with thrombocytopenia; 5K noted on . Review of the trend revelaed that pt has isolated thrombocytopenia of unclear etiology, that has started several months ago, exacerbated in winter 2021 (counts have decreased from 100K/ 2020 to 50K/ 2021 to 5k/ May 2021). Hamlet completed both doses of  MODERNA several weeks prior to the hospital presentation. \par \par Peripheral smear did not note schistocytes, spherocytes, aacnthocytes, polychromasia; no dysplastic immauture cells; labs did not reveal evidence of hemolysis\par \par Differential diagnosis includes;\par - idiopathic ITP\par - modernal caused ITP\par - NHL caused ITP\par - CTD associated ITP (ANA1:360)\par - MDS\par \par started on IV IG 1G/kg daily x2 (21-21) and on prednisone 1mg/kg beginning 21. He had a good response to IVIG 1 G/KG X2 and prednisone 1mg/kg with normalization of platelets count; upon slow tapering of steroids, platelets count nadired to 12K: he was re-challenged with iVIG 1G/KG x2 and prednisone dose was increased back to 1mg/kg: the effect is humble since platelets count has not normalized but decreased to 30sK\par \par Platelets counts improved to 116K (21) and \par 133K (21), when the dose of prednisone was decreased from 130mg to 100mg daily: \par 21: 28K , and dose of prednisone was increased back up to 130mg daily\par 21: 12K (started IVIG x 2 days and continued prednisone 130mg daily)\par 21: 51K\par 21: 35K\par 21: 16K\par 21: 7K (started romiplastim)\par 21:  29 K\par 21: 138K\par 21:  163K\par 6/15/21: 237K\par 21: 300K\par 21: 145K\par \par IMPRESSION; REFRACTORY ITP\par PLAN:\par - pt appears distressed about his condition and is afraid it is life threatening; reassured the patient that we will keep a close look on him\par - concerned if this is refractory disease ; RECOMMEND TO CHANGE THE TREATMENT PLAN; \par - since his disease was refractory to sterids, recommended to change the treatement:\par a) ontinuing steroids (prednisone 1mg/kg daily) , \par b) he was started on rituxian/CD20 monoclonal antibody weekly x4: first weekly dose 21 outpatient\par c) NPLATE ws initiated on 21\par \par - rheum eval in view of +HORACIO\par \par - s/p IVIG 1G/KG daily x 2 as of 21\par - CT CAP IVC to r/o NHL, scheduled for \par - - GIVEN the age of onset of isolated thrombocytopenia, s/p MDSBone marrow bx performed \par \par  21: His platelet count decreased to 7K. \par PBS  reviewed; 0-1 platelets on most of the 100x HPF; no schistocytes  no spherocytes; no polychromasia; no acanthocytes\par He started on Rituxan . We started romiplastim , and his platelets improved to 29K and then 138K. Will continue with Nplate 2mcg/kg weekly to increase platelets and decrease bleeding risk. \par \par A) We will taper by 1mcg/kg if he has 2 consecutive platelet counts over 150-200K.\par B)  If platelet count < 50K then will increase by 1mcg/kg.  \par C) started Nplate on 21, WEEKLY (while waiting for improvement from Rituxan).  \par  \par advised if any subtle signs of CVA / mucocutaneous bleed, immediately call 911 and get to ER\par \par - He was on prednisone 130mg daily, tapered to 110mg on , tapered to 90mg , 70mg QD  QD, will drop to 60mg QD \par - Increase Nplate to  3mcg/kg as platelets dropped and want to continue tapering steroids\par -  last weekly  dose ( dose 4) of rituxan on 21\par - Bone marrow biopsy with IR : aspicular, genetics tests still pending\par - CT CAP 21: Non specific findings, however infiltrative area seen near bifurcation of aorta: next steps to be discussed when Dr. Rondon returns\par \par Microcytic anemia: Retic is 2.7%, Juan is positive with IgG+, C3-, LDH wnl, hapto wnl, TB 1.4, TSAT 12% with ferritin 55\par \par Renal cyst: \par Needs CT renal mass protocol in 3 months to ensure stability (due 2021)\par \par Possible triple vessel disease on CT:\par - PMD and cardio f/u\par \par Plan:\par Increase NPlate to 3mcg/kg and continue to taper steroids, start 60mg tomorrow\par Completed rituxan \par May need repeat bone marrow, results non-diagnostic; genetics pending\par CT scan results to be discussed when Dr. 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 \par He responded well to rituxan and NPLATE, that were initiated in addtion to high dose steroids:\par a) predinisone was tapered frorm 1mg/kg (130mg total daily dose ) to 90 mg (beginning 21: tapering should be every 4-5 days by 20mg from the last daily dose\par b) rituxan : completes the 4th weekly dose on 21\par c) NPLATE: inititated at 2 mcg/k/8-6/15/21: platelets count normalized and has remained so; if counts are normal and stable for 2 weeks -21, start tapering NPLATE to 1 mcg/kg\par \par \par \par

## 2021-06-23 NOTE — CONSULT LETTER
[Dear  ___] : Dear  [unfilled], [Consult Letter:] : I had the pleasure of evaluating your patient, [unfilled]. [Please see my note below.] : Please see my note below. [Sincerely,] : Sincerely, [FreeTextEntry3] : Elvis Rondon DO\par Attending Physician,\par Hematology/ Medical Oncology\par [phone] office\par \par

## 2021-06-23 NOTE — PHYSICAL EXAM
[Restricted in physically strenuous activity but ambulatory and able to carry out work of a light or sedentary nature] : Status 1- Restricted in physically strenuous activity but ambulatory and able to carry out work of a light or sedentary nature, e.g., light house work, office work [Obese] : obese [Normal] : affect appropriate [de-identified] : no bleed  [de-identified] : no mucocutaneous bleed

## 2021-06-25 ENCOUNTER — LABORATORY RESULT (OUTPATIENT)
Age: 66
End: 2021-06-25

## 2021-06-25 ENCOUNTER — APPOINTMENT (OUTPATIENT)
Dept: INFUSION THERAPY | Facility: CLINIC | Age: 66
End: 2021-06-25

## 2021-06-25 ENCOUNTER — APPOINTMENT (OUTPATIENT)
Dept: HEMATOLOGY ONCOLOGY | Facility: CLINIC | Age: 66
End: 2021-06-25
Payer: MEDICARE

## 2021-06-25 VITALS
SYSTOLIC BLOOD PRESSURE: 129 MMHG | BODY MASS INDEX: 45.48 KG/M2 | DIASTOLIC BLOOD PRESSURE: 79 MMHG | HEIGHT: 66 IN | WEIGHT: 283 LBS | HEART RATE: 70 BPM | RESPIRATION RATE: 16 BRPM | TEMPERATURE: 97.6 F

## 2021-06-25 PROCEDURE — 99213 OFFICE O/P EST LOW 20 MIN: CPT

## 2021-06-25 RX ORDER — PREDNISONE 20 MG/1
20 TABLET ORAL DAILY
Qty: 90 | Refills: 0 | Status: ACTIVE | COMMUNITY
Start: 2021-05-14 | End: 1900-01-01

## 2021-06-25 NOTE — REVIEW OF SYSTEMS
[Negative] : Allergic/Immunologic [Fever] : no fever [Chills] : no chills [Night Sweats] : no night sweats [Fatigue] : no fatigue [Recent Change In Weight] : ~T no recent weight change [Vision Problems] : no vision problems [Nosebleeds] : no nosebleeds [Hoarseness] : no hoarseness [Palpitations] : no palpitations [Leg Claudication] : no intermittent leg claudication [Lower Ext Edema] : no lower extremity edema [Shortness Of Breath] : no shortness of breath [Wheezing] : no wheezing [Cough] : no cough [SOB on Exertion] : no shortness of breath during exertion [Vomiting] : no vomiting [Diarrhea] : no diarrhea [Joint Pain] : no joint pain [Joint Stiffness] : no joint stiffness [Muscle Pain] : no muscle pain [Easy Bleeding] : no tendency for easy bleeding [Easy Bruising] : a tendency for easy bruising

## 2021-06-25 NOTE — ASSESSMENT
[FreeTextEntry1] : 67 yo with thrombocytopenia; 5K noted on 21. Review of the trend revelaed that pt has isolated thrombocytopenia of unclear etiology, that has started several months ago, exacerbated in winter 2021 (counts have decreased from 100K/ 2020 to 50K/ 2021 to 5k/ May 2021). Hamlet completed both doses of  MODERNA several weeks prior to the hospital presentation. \par \par Peripheral smear did not note schistocytes, spherocytes, acanthocytes, polychromasia; no dysplastic immature cells; labs did not reveal evidence of hemolysis. \par \par Differential diagnosis includes;\par - idiopathic ITP\par - modernal caused ITP\par - NHL caused ITP\par - CTD associated ITP (ANA1:360)\par - MDS\par \par started on IV IG 1G/kg daily x2 (21-21) and on prednisone 1mg/kg beginning 21. He had a good response to IVIG 1 G/KG X2 and prednisone 1mg/kg with normalization of platelets count; upon slow tapering of steroids, platelets count nadired to 12K: he was re-challenged with iVIG 1G/KG x2 and prednisone dose was increased back to 1mg/kg: the effect is humble since platelets count has not normalized but decreased to 30sK\par \par Platelets counts improved to 116K (21) and \par 133K (21), when the dose of prednisone was decreased from 130mg to 100mg daily: \par 21: 28K , and dose of prednisone was increased back up to 130mg daily\par 21: 12K (started IVIG x 2 days and continued prednisone 130mg daily)\par 21: 51K\par 21: 35K\par 21: 16K\par 21: 7K (started romiplastim)\par 21:  29 K\par 21: 138K\par 21:  163K\par 6/15/21: 237K\par 21: 300K\par 21: 145K\par 21: 72K\par \par IMPRESSION; REFRACTORY ITP\par - since his disease was refractory to sterids, recommended to change the treatment:\par a) continuing steroids (prednisone 1mg/kg daily) \par b) s/p IVIG 1G/KG daily x 2 as of 21\par b) he was started on rituxian/CD20 monoclonal antibody weekly x4: first weekly dose 21 outpatient\par c) NPLATE was initiated on 21\par \par - rheum eval in view of +HORACIO\par - GIVEN the age of onset of isolated thrombocytopenia, s/p MDS Bone marrow bx performed \par \par  21: His platelet count decreased to 7K. \par PBS  reviewed; 0-1 platelets on most of the 100x HPF; no schistocytes  no spherocytes; no polychromasia; no acanthocytes\par He started on Rituxan . We started romiplastim , and his platelets improved to 29K and then 138K. Will continue with Nplate 2mcg/kg weekly to increase platelets and decrease bleeding risk. \par \par A) We will taper by 1mcg/kg if he has 2 consecutive platelet counts over 150-200K.\par B) If platelet count < 50K then will increase by 1mcg/kg.  \par C) started Nplate on 21, WEEKLY (while waiting for improvement from Rituxan).  \par  \par advised if any subtle signs of CVA / mucocutaneous bleed, immediately call 911 and get to ER\par \par - He was on prednisone 130mg daily, tapered to 110mg on , tapered to 90mg , 70mg QD  QD, dropped to 60mg QD  but will continue same dose for now\par - Nplate was increased to 3mcg/kg as platelets dropped\par - Bone marrow biopsy with IR : aspicular, genetic tests still pending\par - CT CAP 21: Non specific findings, however infiltrative area seen near bifurcation of aorta: next steps to be discussed when Dr. Rondon returns\par \par #Renal cyst, noted on CT A/P from 2021\par - briefly discussed CT scan results which show decrease in prior axillary adenopathy; to be reviewed by Dr. 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 \par He responded well to rituxan and NPLATE, that were initiated in addition to high dose steroids:\par a) prednisone was tapered frorm 1mg/kg (130mg total daily dose ) to 90 mg (beginning 21: tapering should be every 4-5 days by 20mg from the last daily dose; will keep at 60mg for now since PLTs are dropping again\par b) rituxan : completed the 4th weekly dose on 21\par c) NPLATE: initiated at 2 mcg/k/8-6/15/21: platelets count normalized and has remained so; if counts continue to drop, will plan to increase N-plate accordingly to guidelines\par \par RTC every Tuesday and Friday with CBC\par \par Case reviewed and patient seen with Dr. Coates, who agrees with plan of care.

## 2021-06-25 NOTE — HISTORY OF PRESENT ILLNESS
[de-identified] : Mr. Dalal is a 66 year old male with PMH of DM, HTN, DLD, MAICOL who was recently admitted for suspected ITP. Patient was noted to have asymptomatic thrombocytopenia as an outpatient and referred to the ED. In the ED his platelet count was 5K with normal WBC and Hg. His smear was reviewed and showed 1-2 schistocytes, no clumping, large platelets, decrease number of platelets, normal WBC morphology/no blasts, some target cells. He was started on prednisone 1 mg/kg of prednisone, and he received two doses of IVIG. His platelets improved to 38 and he was discharged on prednisone. \par Of note, his platelets were 106 in June 2020, ad then 53 in January 2021. Also in January and then in February he received the Moderna vaccine. He does have high RBC count (6.45) and low MCV (73).\par Workup in the hospital showed normal US spleen, no evidence of hemolysis, normal LDH, hapto and ret count, HIV, HepB, HepC EBV IgM, CMV IgM NR , HIT negative , RF negative, peripheral flow negative\par \par Patient informed us he was not discharged with needles for his insulin. His fingersticks have in the low 200s. \par His platelets today are 116. No bleeding, bruising or petechiae. \par \par 5/14/2021- pt returns for follow up today.  feeling well.  some mild bruising where labs drawn otherwise no other bruising.  wbc 10.49 hgb 13.5 hct 23.0 platelets now 133. \par pt made f/up appt with new fer Doss for this monday 5/17/2021. P t checking FS at home sugar 130 today.   [FreeTextEntry1] : Started weekly Ritxuan on 5.28.2021.  Completed on 6.18.2021.   [de-identified] : 5/18/21: Mr. Dalal returns for followup. Since Saturday he has been taking 100mg prednsone daily, he was on 130mg prior to this. No evidence of bleeding, petechiae. His platelets dropped to 28K. We reviewed the smear and there was no clumping, 2-3 platelets per HPF. We told him to take an additional 30mg today.\par \par 5/21/21\par Patient is here for a follow-up visit for immune thrombocytopenia.  He is feeling well with no new complaints.  Reviewed most recent CBC, which shows PLTs are down to 12,000 from 28,000 earlier this week.  His PLT dropped even with increase of prednisone back up to 130mg daily.  Patient denies easier bruising or bleeding.  \par \par 5/26/21\par Patient is here for a follow-up visit for immune thrombocytopenia.  He is feeling well with no new complaints.  Patient denies easier bruising or bleeding.  Since last visit, he was sent to ER for complete workup and received IVIG x 2 days.  During hospital stay, we offered BMBx and to start Rituxan, but patient deferred at that time.  He has been taking Prednisone 130mg daily.  Reviewed most recent CBC which shows PLTs are down to 35,000 from 51,000 earlier this week.  \par \par 5/28/2021- Pt  is here for a follow-up visit for immune thrombocytopenia.  He is feeling well with no new complaints.  Patient denies easier bruising or bleeding.  No Headache no diziness no weakness.   pt was continued on prednisone.  Since last visit, his platelets went from  35,000 to 15,000\par ITP  refractory to current treatment; Discussed risks and benefits of Rituxan including side effects such as fatigue, diarrhea, rashes, hepatitis B reactivation, allergic reactions and death to name a few. Consent obtained.  \par \par 6/1/2021: Patient presents for followup. He started rituxan Friday, 5/28 after decreasing even with increase in steroids. His direct emelia is positive, IgG+, C3 negative.  Hg 13.0, retic is 2.7%. His platelets dropped to 7K. WIll start Romiplastin today.  \par \par 6/4/2021- Patient returns for follow up today for Immune Thrombocytopenia.  Pt was seen on Tuesday 6/1/2021 and Platelets were 7K.  Pt was started on Romiplastin.  He is feeling well.  Denies any bleeding no fatigue no rashes.  CBC done today Platelets now 29K. Pt remains on prednisone no swelling no insomnia. Pt has been trying to walk feeling well, loosing weight.  \par \par 6/8/2021: Mr Dalal returns for followup of ITP. His platelets are now 145. He was started on romiplastim last week and he is continuing with rituxan weekly and prednisone.  He denies bleeding, petechiae, rash, melena, BRBPR\par \par 6/11/21\par Pt is here today for follow up visit for ITP.  His platelets improved, now 163. He was started on romiplastim 6/1/21 and he is continuing with rituxan weekly and prednisone.  He denies bleeding, petechiae, rash, melena, BRBPR.  He is scheduled for bone marrow bx on 6/14/21 at IR and CT scan C/A/P with contrast on 6/17/21.\par \par 6/18/21\par Pt is here today for follow up visit for ITP.  His platelets improved, now 308 from 237, 163) He was started on romiplastim 6/1/21 and he is continuing with rituxan weekly and prednisone. Today is his 4th Rituxan.  He is taking Prednisone 90 mg PO daily. He denies bleeding, petechiae, rash, melena, BRBPR.  \par Bone marrow bx was done at IR on 6/14/21- result pending.  CT scan C/A/P with contrast done yesterday- result pending.\par \par 6/22/21:\par Completed rituxan on 6/18. Tapered to 70mg from 90mg since Saturday . Platelts today are 145, platelets were 340 on Friday. We will increase Nplate to 3mcg/kg and continue to taper steroids. \par \par CT Chest 6/17\par IMPRESSION:\par Decreased size of bilateral axillary lymph nodes now measuring up to 1.3 cm on the left. No mediastinal or hilar lymphadenopathy.\par No CT evidence of an acute intrathoracic pathological process.\par Evidence of multivessel coronary arterial disease.\par Please see separately dictated CT abdomen and pelvis for additional findings.\par \par IMPRESSION:\par Mildly enlarged right retrocrural lymph nodes measuring up to 1.2 cm.\par Infiltrative changes within the retroperitoneum at the level of the aortic bifurcation.\par Above findings are nonspecific, but lymphomatous disease is not excluded\par Indeterminate left upper pole renal lesion with questionable mural nodule. Follow-up CT renal mass protocol is recommended in 3 months to demonstrate stability\par \par DIscussed that were non-specific findings on CT CAP, to be further discussed with his primary hematologist. \par \par BONE MARROW BX done 6/14TH: Pathology recommended repeat biopsys; Molecular analysis pending, cytogenetics pending, FISH pending, \par \par 6/25/21\par Patient is here for a follow-up visit for ITP.   He completed weekly Rituxan on 6.18.2021.  He has been receiving weekly N-plate which was titrated up to 3mcg/kg last week.  He remains on 60mg prednisone daily.  Reviewed most recent CBC which shows leukocytosis (likely 2/2 steroid), mild anemia and reoccurrence of thrombocytopenia.  PLTs dropped to 73,000 from 145,000 last week.  He denies any fevers, chills, abdominal pain, early satiety or bleeding.  He notes same old bruising on extremities but nothing new.

## 2021-06-25 NOTE — PHYSICAL EXAM
[Restricted in physically strenuous activity but ambulatory and able to carry out work of a light or sedentary nature] : Status 1- Restricted in physically strenuous activity but ambulatory and able to carry out work of a light or sedentary nature, e.g., light house work, office work [Obese] : obese [Normal] : affect appropriate [de-identified] : no bleed  [de-identified] : no mucocutaneous bleed, some small bruising noted on extremities

## 2021-06-29 ENCOUNTER — APPOINTMENT (OUTPATIENT)
Dept: INFUSION THERAPY | Facility: CLINIC | Age: 66
End: 2021-06-29
Payer: MEDICARE

## 2021-06-29 ENCOUNTER — LABORATORY RESULT (OUTPATIENT)
Age: 66
End: 2021-06-29

## 2021-06-29 ENCOUNTER — APPOINTMENT (OUTPATIENT)
Dept: HEMATOLOGY ONCOLOGY | Facility: CLINIC | Age: 66
End: 2021-06-29
Payer: MEDICARE

## 2021-06-29 VITALS
RESPIRATION RATE: 16 BRPM | DIASTOLIC BLOOD PRESSURE: 60 MMHG | HEART RATE: 95 BPM | SYSTOLIC BLOOD PRESSURE: 116 MMHG | TEMPERATURE: 97.9 F | WEIGHT: 278 LBS | HEIGHT: 66 IN | BODY MASS INDEX: 44.68 KG/M2

## 2021-06-29 DIAGNOSIS — R59.9 ENLARGED LYMPH NODES, UNSPECIFIED: ICD-10-CM

## 2021-06-29 DIAGNOSIS — Z51.81 ENCOUNTER FOR THERAPEUTIC DRUG LVL MONITORING: ICD-10-CM

## 2021-06-29 PROCEDURE — 99214 OFFICE O/P EST MOD 30 MIN: CPT

## 2021-06-29 RX ORDER — ROMIPLOSTIM 250 UG/.5ML
385 INJECTION, POWDER, LYOPHILIZED, FOR SOLUTION SUBCUTANEOUS ONCE
Refills: 0 | Status: COMPLETED | OUTPATIENT
Start: 2021-06-29 | End: 2021-06-29

## 2021-06-29 RX ADMIN — ROMIPLOSTIM 385 MICROGRAM(S): 250 INJECTION, POWDER, LYOPHILIZED, FOR SOLUTION SUBCUTANEOUS at 15:00

## 2021-06-30 LAB
ALBUMIN SERPL ELPH-MCNC: 3.7 G/DL
ALP BLD-CCNC: 51 U/L
ALT SERPL-CCNC: 61 U/L
ANION GAP SERPL CALC-SCNC: 15 MMOL/L
AST SERPL-CCNC: 50 U/L
BILIRUB DIRECT SERPL-MCNC: <0.2 MG/DL
BILIRUB INDIRECT SERPL-MCNC: >0.5 MG/DL
BILIRUB SERPL-MCNC: 0.7 MG/DL
BUN SERPL-MCNC: 25 MG/DL
CALCIUM SERPL-MCNC: 9.3 MG/DL
CHLORIDE SERPL-SCNC: 97 MMOL/L
CHROM ANALY OVERALL INTERP SPEC-IMP: SIGNIFICANT CHANGE UP
CO2 SERPL-SCNC: 25 MMOL/L
CREAT SERPL-MCNC: 0.9 MG/DL
GLUCOSE SERPL-MCNC: 236 MG/DL
HCT VFR BLD CALC: 42.5 %
HCT VFR BLD CALC: 44.2 %
HCT VFR BLD CALC: 44.7 %
HCT VFR BLD CALC: 45.2 %
HGB BLD-MCNC: 13.2 G/DL
HGB BLD-MCNC: 13.7 G/DL
HGB BLD-MCNC: 13.7 G/DL
HGB BLD-MCNC: 14.1 G/DL
MCHC RBC-ENTMCNC: 24.3 PG
MCHC RBC-ENTMCNC: 24.5 PG
MCHC RBC-ENTMCNC: 24.6 PG
MCHC RBC-ENTMCNC: 24.9 PG
MCHC RBC-ENTMCNC: 30.6 G/DL
MCHC RBC-ENTMCNC: 31 G/DL
MCHC RBC-ENTMCNC: 31.1 G/DL
MCHC RBC-ENTMCNC: 31.2 G/DL
MCV RBC AUTO: 78.4 FL
MCV RBC AUTO: 79.3 FL
MCV RBC AUTO: 79.8 FL
MCV RBC AUTO: 79.9 FL
PLATELET # BLD AUTO: 145 K/UL
PLATELET # BLD AUTO: 150 K/UL
PLATELET # BLD AUTO: 308 K/UL
PLATELET # BLD AUTO: 73 K/UL
PMV BLD: 10.6 FL
PMV BLD: 11.4 FL
PMV BLD: NORMAL
PMV BLD: NORMAL
POTASSIUM SERPL-SCNC: 5 MMOL/L
PROT SERPL-MCNC: 6.5 G/DL
RBC # BLD: 5.36 M/UL
RBC # BLD: 5.6 M/UL
RBC # BLD: 5.64 M/UL
RBC # BLD: 5.66 M/UL
RBC # FLD: 20.2 %
RBC # FLD: 20.6 %
RBC # FLD: 20.6 %
RBC # FLD: 20.7 %
SODIUM SERPL-SCNC: 137 MMOL/L
WBC # FLD AUTO: 10.05 K/UL
WBC # FLD AUTO: 11.24 K/UL
WBC # FLD AUTO: 11.48 K/UL
WBC # FLD AUTO: 13.53 K/UL

## 2021-07-01 PROBLEM — Z51.81 ENCOUNTER FOR MEDICATION MONITORING: Status: ACTIVE | Noted: 2021-06-18

## 2021-07-01 NOTE — ASSESSMENT
[FreeTextEntry1] : 67 yo with thrombocytopenia; 5K noted on 5.7.21. Review of the trend revelaed that pt has isolated thrombocytopenia of unclear etiology, that has started several months ago, exacerbated in winter 2021 (counts have decreased from 100K/ June 2020 to 50K/ January 2021 to 5k/ May 2021). Hamlet completed both doses of  MODERNA several weeks prior to the hospital presentation. \par \par Peripheral smear did not note schistocytes, spherocytes, acanthocytes, polychromasia; no dysplastic immature cells; labs did not reveal evidence of hemolysis. \par \par Differential diagnosis includes;\par - idiopathic ITP\par - modernal caused ITP\par - NHL caused ITP\par - CTD associated ITP (ANA1:360)\par - MDS\par \par started on IV IG 1G/kg daily x2 (5/7/21-5/8/21) and on prednisone 1mg/kg beginning 5/7/21. He had a good response to IVIG 1 G/KG X2 and prednisone 1mg/kg with normalization of platelets count; upon slow tapering of steroids, platelets count nadired to 12K: he was re-challenged with iVIG 1G/KG x2 and prednisone dose was increased back to 1mg/kg: the effect is humble since platelets count has not normalized but decreased to 30sK\par \par Platelets counts improved to 116K (5/11/21) and \par 133K (5/14/21), when the dose of prednisone was decreased from 130mg to 100mg daily: \par 5/18/21: 28K , and dose of prednisone was increased back up to 130mg daily\par 5/21/21: 12K (started IVIG x 2 days and continued prednisone 130mg daily)\par 5/24/21: 51K\par 5/26/21: 35K\par 5/28/21: 16K\par 6/01/21: 7K (started romiplastim)\par 6/04/21:  29 K\par 6/08/21: 138K\par 6/11/21:  163K\par 6/15/21: 237K\par 6/18/21: 300K\par 6/22/21: 145K\par 6/24/21: 72K\par 6/29/21: 150K\par \par IMPRESSION; REFRACTORY ITP\par - since his disease was refractory to sterids, recommended to change the treatment:\par a) continuing steroids (prednisone 1mg/kg daily), now tapering\par b) s/p IVIG 1G/KG daily x 2 as of 5/22/21\par b) he was started on rituxian/CD20 monoclonal antibody weekly x4: first weekly dose 5.28.21 outpatient\par c) NPLATE was initiated on 6.4.21\par \par - rheum eval in view of +HORACIO\par - GIVEN the age of onset of isolated thrombocytopenia, s/p MDS Bone marrow bx performed 6/14\par \par  6.1.21: His platelet count decreased to 7K. \par PBS  reviewed; 0-1 platelets on most of the 100x HPF; no schistocytes  no spherocytes; no polychromasia; no acanthocytes\par He started on Rituxan 5/28 with good response.\par \par A) We will taper by 1mcg/kg if he has 2 consecutive platelet counts over 150-200K.\par B) If platelet count < 50K then will increase by 1mcg/kg.  \par C) started Nplate on 6.1.21, WEEKLY (while waiting for improvement from Rituxan).  \par  \par advised if any subtle signs of CVA / mucocutaneous bleed, immediately call 911 and get to ER\par \par - He was on prednisone 130mg daily, tapered to 110mg on 6/11, tapered to 90mg 6/16, 70mg QD 6/19 QD, dropped to 60mg QD 6/23 but will continue same dose for now\par - Nplate was increased to 3mcg/kg as platelets dropped\par - Bone marrow biopsy with IR June 14: aspicular, genetic tests still pending\par - CT CAP 6/17/21: Non specific findings, however infiltrative area seen near bifurcation of aorta: recommended PET scan \par \par #Renal cyst, noted on CT A/P from 06/2021\par - briefly discussed CT scan results which show decrease in prior axillary adenopathy; to be reviewed by Dr. 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 \par He responded well to rituxan and NPLATE, that were initiated in addition to high dose steroids:\par a) prednisone was tapered frorm 1mg/kg (130mg total daily dose ), decreased to 40mg QD starting 6/29: tapering should be every 4-5 days by 20mg from the last daily dose\par b) rituxan : completed the 4th weekly dose on 6.18.21\par c) Nplate started at 2mcg/kg, increased to on 6/22\par d) CT showed some adenopathy, recommend PET\par e) Bone marrow was aspicular, increased megakaryocytes but on Nplate, genetics pending\par \par RTC every Tuesday and Friday with CBC

## 2021-07-01 NOTE — REVIEW OF SYSTEMS
[Easy Bruising] : a tendency for easy bruising [Negative] : Allergic/Immunologic [Fever] : no fever [Chills] : no chills [Night Sweats] : no night sweats [Fatigue] : no fatigue [Recent Change In Weight] : ~T no recent weight change [Vision Problems] : no vision problems [Nosebleeds] : no nosebleeds [Hoarseness] : no hoarseness [Palpitations] : no palpitations [Leg Claudication] : no intermittent leg claudication [Lower Ext Edema] : no lower extremity edema [Shortness Of Breath] : no shortness of breath [Wheezing] : no wheezing [Cough] : no cough [SOB on Exertion] : no shortness of breath during exertion [Vomiting] : no vomiting [Diarrhea] : no diarrhea [Joint Pain] : no joint pain [Joint Stiffness] : no joint stiffness [Muscle Pain] : no muscle pain [Easy Bleeding] : no tendency for easy bleeding

## 2021-07-01 NOTE — PHYSICAL EXAM
[Restricted in physically strenuous activity but ambulatory and able to carry out work of a light or sedentary nature] : Status 1- Restricted in physically strenuous activity but ambulatory and able to carry out work of a light or sedentary nature, e.g., light house work, office work [Obese] : obese [Normal] : affect appropriate [de-identified] : no bleed  [de-identified] : no mucocutaneous bleed, some small bruising noted on extremities

## 2021-07-01 NOTE — HISTORY OF PRESENT ILLNESS
[de-identified] : Mr. Dalal is a 66 year old male with PMH of DM, HTN, DLD, MAICOL who was recently admitted for suspected ITP. Patient was noted to have asymptomatic thrombocytopenia as an outpatient and referred to the ED. In the ED his platelet count was 5K with normal WBC and Hg. His smear was reviewed and showed 1-2 schistocytes, no clumping, large platelets, decrease number of platelets, normal WBC morphology/no blasts, some target cells. He was started on prednisone 1 mg/kg of prednisone, and he received two doses of IVIG. His platelets improved to 38 and he was discharged on prednisone. \par Of note, his platelets were 106 in June 2020, ad then 53 in January 2021. Also in January and then in February he received the Moderna vaccine. He does have high RBC count (6.45) and low MCV (73).\par Workup in the hospital showed normal US spleen, no evidence of hemolysis, normal LDH, hapto and ret count, HIV, HepB, HepC EBV IgM, CMV IgM NR , HIT negative , RF negative, peripheral flow negative\par \par Patient informed us he was not discharged with needles for his insulin. His fingersticks have in the low 200s. \par His platelets today are 116. No bleeding, bruising or petechiae. \par \par 5/14/2021- pt returns for follow up today.  feeling well.  some mild bruising where labs drawn otherwise no other bruising.  wbc 10.49 hgb 13.5 hct 23.0 platelets now 133. \par pt made f/up appt with new fer Doss for this monday 5/17/2021. P t checking FS at home sugar 130 today.   [FreeTextEntry1] : Started weekly Ritxuan on 5.28.2021.  Completed on 6.18.2021.   [de-identified] : 5/18/21: Mr. Dalal returns for followup. Since Saturday he has been taking 100mg prednsone daily, he was on 130mg prior to this. No evidence of bleeding, petechiae. His platelets dropped to 28K. We reviewed the smear and there was no clumping, 2-3 platelets per HPF. We told him to take an additional 30mg today.\par \par 5/21/21\par Patient is here for a follow-up visit for immune thrombocytopenia.  He is feeling well with no new complaints.  Reviewed most recent CBC, which shows PLTs are down to 12,000 from 28,000 earlier this week.  His PLT dropped even with increase of prednisone back up to 130mg daily.  Patient denies easier bruising or bleeding.  \par \par 5/26/21\par Patient is here for a follow-up visit for immune thrombocytopenia.  He is feeling well with no new complaints.  Patient denies easier bruising or bleeding.  Since last visit, he was sent to ER for complete workup and received IVIG x 2 days.  During hospital stay, we offered BMBx and to start Rituxan, but patient deferred at that time.  He has been taking Prednisone 130mg daily.  Reviewed most recent CBC which shows PLTs are down to 35,000 from 51,000 earlier this week.  \par \par 5/28/2021- Pt  is here for a follow-up visit for immune thrombocytopenia.  He is feeling well with no new complaints.  Patient denies easier bruising or bleeding.  No Headache no diziness no weakness.   pt was continued on prednisone.  Since last visit, his platelets went from  35,000 to 15,000\par ITP  refractory to current treatment; Discussed risks and benefits of Rituxan including side effects such as fatigue, diarrhea, rashes, hepatitis B reactivation, allergic reactions and death to name a few. Consent obtained.  \par \par 6/1/2021: Patient presents for followup. He started rituxan Friday, 5/28 after decreasing even with increase in steroids. His direct emelia is positive, IgG+, C3 negative.  Hg 13.0, retic is 2.7%. His platelets dropped to 7K. WIll start Romiplastin today.  \par \par 6/4/2021- Patient returns for follow up today for Immune Thrombocytopenia.  Pt was seen on Tuesday 6/1/2021 and Platelets were 7K.  Pt was started on Romiplastin.  He is feeling well.  Denies any bleeding no fatigue no rashes.  CBC done today Platelets now 29K. Pt remains on prednisone no swelling no insomnia. Pt has been trying to walk feeling well, loosing weight.  \par \par 6/8/2021: Mr Dalal returns for followup of ITP. His platelets are now 145. He was started on romiplastim last week and he is continuing with rituxan weekly and prednisone.  He denies bleeding, petechiae, rash, melena, BRBPR\par \par 6/11/21\par Pt is here today for follow up visit for ITP.  His platelets improved, now 163. He was started on romiplastim 6/1/21 and he is continuing with rituxan weekly and prednisone.  He denies bleeding, petechiae, rash, melena, BRBPR.  He is scheduled for bone marrow bx on 6/14/21 at IR and CT scan C/A/P with contrast on 6/17/21.\par \par 6/18/21\par Pt is here today for follow up visit for ITP.  His platelets improved, now 308 from 237, 163) He was started on romiplastim 6/1/21 and he is continuing with rituxan weekly and prednisone. Today is his 4th Rituxan.  He is taking Prednisone 90 mg PO daily. He denies bleeding, petechiae, rash, melena, BRBPR.  \par Bone marrow bx was done at IR on 6/14/21- result pending.  CT scan C/A/P with contrast done yesterday- result pending.\par \par 6/22/21:\par Completed rituxan on 6/18. Tapered to 70mg from 90mg since Saturday . Platelts today are 145, platelets were 340 on Friday. We will increase Nplate to 3mcg/kg and continue to taper steroids. \par \par CT Chest 6/17\par IMPRESSION:\par Decreased size of bilateral axillary lymph nodes now measuring up to 1.3 cm on the left. No mediastinal or hilar lymphadenopathy.\par No CT evidence of an acute intrathoracic pathological process.\par Evidence of multivessel coronary arterial disease.\par Please see separately dictated CT abdomen and pelvis for additional findings.\par \par IMPRESSION:\par Mildly enlarged right retrocrural lymph nodes measuring up to 1.2 cm.\par Infiltrative changes within the retroperitoneum at the level of the aortic bifurcation.\par Above findings are nonspecific, but lymphomatous disease is not excluded\par Indeterminate left upper pole renal lesion with questionable mural nodule. Follow-up CT renal mass protocol is recommended in 3 months to demonstrate stability\par \par DIscussed that were non-specific findings on CT CAP, to be further discussed with his primary hematologist. \par \par BONE MARROW BX done 6/14TH: Pathology recommended repeat biopsys; Molecular analysis pending, cytogenetics pending, FISH pending, \par \par 6/25/21\par Patient is here for a follow-up visit for ITP.   He completed weekly Rituxan on 6.18.2021.  He has been receiving weekly N-plate which was titrated up to 3mcg/kg last week.  He remains on 60mg prednisone daily.  Reviewed most recent CBC which shows leukocytosis (likely 2/2 steroid), mild anemia and reoccurrence of thrombocytopenia.  PLTs dropped to 73,000 from 145,000 last week.  He denies any fevers, chills, abdominal pain, early satiety or bleeding.  He notes same old bruising on extremities but nothing new. \par \par 6/29/21\par Mr. Dalal returns for followup. On Friday his platelets dropped to 73, he remained on Nplate 3mcg/kg and 60 mg at that time. Today his platelets improved to 150. We discussed that he had a few enlarged lymph nodes on CT and recommended PET. Also recommended to decrease prednisone to 40mg QD and continue current Nplate dose.

## 2021-07-06 LAB — CHROM ANALY INTERPHASE BLD FISH-IMP: SIGNIFICANT CHANGE UP

## 2021-07-07 ENCOUNTER — APPOINTMENT (OUTPATIENT)
Dept: HEMATOLOGY ONCOLOGY | Facility: CLINIC | Age: 66
End: 2021-07-07
Payer: MEDICARE

## 2021-07-07 ENCOUNTER — APPOINTMENT (OUTPATIENT)
Dept: INFUSION THERAPY | Facility: CLINIC | Age: 66
End: 2021-07-07

## 2021-07-07 ENCOUNTER — LABORATORY RESULT (OUTPATIENT)
Age: 66
End: 2021-07-07

## 2021-07-07 VITALS
RESPIRATION RATE: 16 BRPM | TEMPERATURE: 98.8 F | SYSTOLIC BLOOD PRESSURE: 111 MMHG | WEIGHT: 281 LBS | HEIGHT: 66 IN | HEART RATE: 79 BPM | BODY MASS INDEX: 45.16 KG/M2 | DIASTOLIC BLOOD PRESSURE: 67 MMHG

## 2021-07-07 PROCEDURE — 99215 OFFICE O/P EST HI 40 MIN: CPT

## 2021-07-07 RX ORDER — ROMIPLOSTIM 250 UG/.5ML
385 INJECTION, POWDER, LYOPHILIZED, FOR SOLUTION SUBCUTANEOUS ONCE
Refills: 0 | Status: COMPLETED | OUTPATIENT
Start: 2021-07-07 | End: 2021-07-07

## 2021-07-07 RX ADMIN — ROMIPLOSTIM 385 MICROGRAM(S): 250 INJECTION, POWDER, LYOPHILIZED, FOR SOLUTION SUBCUTANEOUS at 17:23

## 2021-07-07 NOTE — ASSESSMENT
[FreeTextEntry1] : 67 yo with thrombocytopenia; 5K noted on 5.7.21. Review of the trend revelaed that pt has isolated thrombocytopenia of unclear etiology, that has started several months ago, exacerbated in winter 2021 (counts have decreased from 100K/ June 2020 to 50K/ January 2021 to 5k/ May 2021). Hamlet completed both doses of  MODERNA several weeks prior to the hospital presentation. \par \par Peripheral smear did not note schistocytes, spherocytes, acanthocytes, polychromasia; no dysplastic immature cells; labs did not reveal evidence of hemolysis. \par \par Differential diagnosis includes;\par - idiopathic ITP\par - modernal caused ITP\par - NHL caused ITP\par - CTD associated ITP (ANA1:360)\par - MDS\par \par started on IV IG 1G/kg daily x2 (5/7/21-5/8/21) and on prednisone 1mg/kg beginning 5/7/21. He had a good response to IVIG 1 G/KG X2 and prednisone 1mg/kg with normalization of platelets count; upon slow tapering of steroids, platelets count nadired to 12K: he was re-challenged with iVIG 1G/KG x2 and prednisone dose was increased back to 1mg/kg: the effect is humble since platelets count has not normalized but decreased to 30sK\par \par Platelets counts improved to 116K (5/11/21) and \par 133K (5/14/21), when the dose of prednisone was decreased from 130mg to 100mg daily: \par 5/18/21: 28K , and dose of prednisone was increased back up to 130mg daily\par 5/21/21: 12K (started IVIG x 2 days and continued prednisone 130mg daily)\par 5/24/21: 51K\par 5/26/21: 35K\par 5/28/21: 16K\par 6/01/21: 7K (started romiplastim)\par 6/04/21:  29 K\par 6/08/21: 138K\par 6/11/21:  163K\par 6/15/21: 237K\par 6/18/21: 300K\par 6/22/21: 145K\par 6/24/21: 72K\par 6/29/21: 150K\par 7/7/21: 251K \par \par IMPRESSION; REFRACTORY ITP\par - since his disease was refractory to sterids, recommended to change the treatment:\par a) continuing steroids (prednisone 1mg/kg daily), now tapering\par b) s/p IVIG 1G/KG daily x 2 as of 5/22/21\par b) he was started on rituxian/CD20 monoclonal antibody weekly x4: first weekly dose 5.28.21 outpatient\par c) NPLATE was initiated on 6.4.21\par \par - rheum eval in view of +HORACIO\par - GIVEN the age of onset of isolated thrombocytopenia, s/p MDS Bone marrow bx performed 6/14\par \par  6.1.21: His platelet count decreased to 7K. \par PBS  reviewed; 0-1 platelets on most of the 100x HPF; no schistocytes  no spherocytes; no polychromasia; no acanthocytes\par He started on Rituxan 5/28 with good response.\par \par A) We will taper by 1mcg/kg if he has 2 consecutive platelet counts over 150-200K.\par B) If platelet count < 50K then will increase by 1mcg/kg.  \par C) started Nplate on 6.1.21, WEEKLY (while waiting for improvement from Rituxan).  \par  \par advised if any subtle signs of CVA / mucocutaneous bleed, immediately call 911 and get to ER\par \par - He was on prednisone 130mg daily, tapered to 110mg on 6/11, tapered to 90mg 6/16, 70mg QD 6/19 QD, dropped to 60mg QD 6/23 but will continue same dose for now\par - Nplate was increased to 3mcg/kg as platelets dropped\par - Bone marrow biopsy with IR June 14: aspicular, MDS FISH panel is negative, \par - CT CAP 6/17/21: Non specific findings, however infiltrative area seen near bifurcation of aorta: recommended PET scan \par - We will taper from 40mg to 30mg of prednisone and continue the current dose of Nplate, he will followup Friday and then come back Tuesday to resume the Tu/Fri\par \par #Renal cyst, noted on CT A/P from 06/2021\par - briefly discussed CT scan results which show decrease in prior axillary adenopathy; to be reviewed by Dr. 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 \par He responded well to rituxan and NPLATE, that were initiated in addition to high dose steroids:\par a) prednisone was tapered frorm 1mg/kg (130mg total daily dose ), decreased to 30mg QD starting 7.8.21: tapering should be every 4-5 days by 10mg from the last daily dose\par b) rituxan : completed the 4th weekly dose on 6.18.21\par c) Nplate started at 2mcg/kg, increased to 3mcg/kg on 6/22; vontinue sane dose; if plats count stable on 7.13.21, will decrease to 2 mcg/kg\par d) CT showed a single 1.2 retrocural lymph node and infiltrative changes\par e) Bone marrow was aspicular, increased megakaryocytes but on Nplate, cytogenetics and FISH are negative\par \par RTC 7.9.21, and then every Tuesday and Friday with CBC

## 2021-07-07 NOTE — PHYSICAL EXAM
[Restricted in physically strenuous activity but ambulatory and able to carry out work of a light or sedentary nature] : Status 1- Restricted in physically strenuous activity but ambulatory and able to carry out work of a light or sedentary nature, e.g., light house work, office work [Obese] : obese [Normal] : affect appropriate [de-identified] : no bleed  [de-identified] : no mucocutaneous bleed, some small bruising noted on extremities

## 2021-07-08 LAB
HCT VFR BLD CALC: 43.8 %
HGB BLD-MCNC: 13.7 G/DL
MCHC RBC-ENTMCNC: 24.7 PG
MCHC RBC-ENTMCNC: 31.3 G/DL
MCV RBC AUTO: 79.1 FL
PLATELET # BLD AUTO: 251 K/UL
PMV BLD: 11.2 FL
RBC # BLD: 5.54 M/UL
RBC # FLD: 20.5 %
WBC # FLD AUTO: 8.74 K/UL

## 2021-07-09 ENCOUNTER — APPOINTMENT (OUTPATIENT)
Dept: HEMATOLOGY ONCOLOGY | Facility: CLINIC | Age: 66
End: 2021-07-09
Payer: MEDICARE

## 2021-07-09 ENCOUNTER — LABORATORY RESULT (OUTPATIENT)
Age: 66
End: 2021-07-09

## 2021-07-09 VITALS
BODY MASS INDEX: 45.16 KG/M2 | HEART RATE: 83 BPM | TEMPERATURE: 97.9 F | SYSTOLIC BLOOD PRESSURE: 104 MMHG | DIASTOLIC BLOOD PRESSURE: 57 MMHG | HEIGHT: 66 IN | WEIGHT: 281 LBS

## 2021-07-09 LAB
HCT VFR BLD CALC: 44.6 %
HGB BLD-MCNC: 14 G/DL
MCHC RBC-ENTMCNC: 24.6 PG
MCHC RBC-ENTMCNC: 31.4 G/DL
MCV RBC AUTO: 78.4 FL
PLATELET # BLD AUTO: 208 K/UL
PMV BLD: 10.3 FL
RBC # BLD: 5.69 M/UL
RBC # FLD: 20.7 %
WBC # FLD AUTO: 8.76 K/UL

## 2021-07-09 PROCEDURE — 99214 OFFICE O/P EST MOD 30 MIN: CPT

## 2021-07-09 NOTE — ASSESSMENT
[FreeTextEntry1] : 67 yo with thrombocytopenia; 5K noted on 5.7.21. Review of the trend revelaed that pt has isolated thrombocytopenia of unclear etiology, that has started several months ago, exacerbated in winter 2021 (counts have decreased from 100K/ June 2020 to 50K/ January 2021 to 5k/ May 2021). Hamlet completed both doses of  MODERNA several weeks prior to the hospital presentation. \par \par Peripheral smear did not note schistocytes, spherocytes, acanthocytes, polychromasia; no dysplastic immature cells; labs did not reveal evidence of hemolysis. \par \par Differential diagnosis includes;\par - idiopathic ITP\par - modernal caused ITP\par - NHL caused ITP\par - CTD associated ITP (ANA1:360)\par - MDS\par \par started on IV IG 1G/kg daily x2 (5/7/21-5/8/21) and on prednisone 1mg/kg beginning 5/7/21. He had a good response to IVIG 1 G/KG X2 and prednisone 1mg/kg with normalization of platelets count; upon slow tapering of steroids, platelets count nadired to 12K: he was re-challenged with iVIG 1G/KG x2 and prednisone dose was increased back to 1mg/kg: the effect is humble since platelets count has not normalized but decreased to 30sK\par \par Platelets counts improved to 116K (5/11/21) and \par 133K (5/14/21), when the dose of prednisone was decreased from 130mg to 100mg daily: \par 5/18/21: 28K , and dose of prednisone was increased back up to 130mg daily\par 5/21/21: 12K (started IVIG x 2 days and continued prednisone 130mg daily)\par 5/24/21: 51K\par 5/26/21: 35K\par 5/28/21: 16K\par 6/01/21: 7K (started romiplastim)\par 6/04/21:  29 K\par 6/08/21: 138K\par 6/11/21:  163K\par 6/15/21: 237K\par 6/18/21: 300K\par 6/22/21: 145K\par 6/24/21: 72K\par 6/29/21: 150K\par 7/7/21: 251K \par 7/9/21: 208K\par \par IMPRESSION; REFRACTORY ITP\par - since his disease was refractory to sterids, recommended to change the treatment:\par a) continuing steroids (prednisone 1mg/kg daily), now tapering\par b) s/p IVIG 1G/KG daily x 2 as of 5/22/21\par b) he was started on rituxian/CD20 monoclonal antibody weekly x4: first weekly dose 5.28.21 outpatient\par c) NPLATE was initiated on 6.4.21\par \par - rheum eval in view of +HORACIO\par - GIVEN the age of onset of isolated thrombocytopenia, s/p MDS Bone marrow bx performed 6/14\par \par  6.1.21: His platelet count decreased to 7K. \par PBS  reviewed; 0-1 platelets on most of the 100x HPF; no schistocytes  no spherocytes; no polychromasia; no acanthocytes\par He started on Rituxan 5/28 with good response.\par \par A) We will taper by 1mcg/kg if he has 2 consecutive platelet counts over 150-200K.\par B) If platelet count < 50K then will increase by 1mcg/kg.  \par C) started Nplate on 6.1.21, WEEKLY (while waiting for improvement from Rituxan).  \par  \par advised if any subtle signs of CVA / mucocutaneous bleed, immediately call 911 and get to ER\par \par - He was on prednisone 130mg daily, tapered to 110mg on 6/11, tapered to 90mg 6/16, 70mg QD 6/19 QD, dropped to 60mg QD 6/23 then tapered down to 40mg and now on 30mg daily; we will taper down to 20mg daily starting 7/10\par - Nplate was increased to 3mcg/kg as platelets dropped\par - Bone marrow biopsy with IR June 14: aspicular, MDS FISH panel is negative, \par - CT CAP 6/17/21: Non specific findings, however infiltrative area seen near bifurcation of aorta: recommended PET scan \par \par #Renal cyst, noted on CT A/P from 06/2021\par - briefly discussed CT scan results which show decrease in prior axillary adenopathy; to be reviewed by Dr. 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 \par He responded well to rituxan and NPLATE, that were initiated in addition to high dose steroids:\par a) prednisone was tapered frorm 1mg/kg (130mg total daily dose ), decreased to 30mg QD starting 7.8.21: tapering should be every 4-5 days by 10mg from the last daily dose.  Starting 7.10.21 he will decrease to 20mg daily\par b) rituxan : completed the 4th weekly dose on 6.18.21\par c) Nplate started at 2mcg/kg, increased to 3mcg/kg on 6/22; vontinue sane dose; if plats count stable on 7.13.21, will decrease to 2 mcg/kg\par d) CT showed a single 1.2 retrocural lymph node and infiltrative changes\par e) Bone marrow was aspicular, increased megakaryocytes but on Nplate, cytogenetics and FISH are negative\par \par RTC 7.13.21 and then every Tuesday and Friday with CBC

## 2021-07-09 NOTE — HISTORY OF PRESENT ILLNESS
[de-identified] : Mr. Dalal is a 66 year old male with PMH of DM, HTN, DLD, MAICOL who was recently admitted for suspected ITP. Patient was noted to have asymptomatic thrombocytopenia as an outpatient and referred to the ED. In the ED his platelet count was 5K with normal WBC and Hg. His smear was reviewed and showed 1-2 schistocytes, no clumping, large platelets, decrease number of platelets, normal WBC morphology/no blasts, some target cells. He was started on prednisone 1 mg/kg of prednisone, and he received two doses of IVIG. His platelets improved to 38 and he was discharged on prednisone. \par Of note, his platelets were 106 in June 2020, ad then 53 in January 2021. Also in January and then in February he received the Moderna vaccine. He does have high RBC count (6.45) and low MCV (73).\par Workup in the hospital showed normal US spleen, no evidence of hemolysis, normal LDH, hapto and ret count, HIV, HepB, HepC EBV IgM, CMV IgM NR , HIT negative , RF negative, peripheral flow negative\par \par Patient informed us he was not discharged with needles for his insulin. His fingersticks have in the low 200s. \par His platelets today are 116. No bleeding, bruising or petechiae. \par \par 5/14/2021- pt returns for follow up today.  feeling well.  some mild bruising where labs drawn otherwise no other bruising.  wbc 10.49 hgb 13.5 hct 23.0 platelets now 133. \par pt made f/up appt with new fer Doss for this monday 5/17/2021. P t checking FS at home sugar 130 today.   [FreeTextEntry1] : Started weekly Ritxuan on 5.28.2021.  Completed on 6.18.2021.   [de-identified] : 5/18/21: Mr. Dalal returns for followup. Since Saturday he has been taking 100mg prednsone daily, he was on 130mg prior to this. No evidence of bleeding, petechiae. His platelets dropped to 28K. We reviewed the smear and there was no clumping, 2-3 platelets per HPF. We told him to take an additional 30mg today.\par \par 5/21/21\par Patient is here for a follow-up visit for immune thrombocytopenia.  He is feeling well with no new complaints.  Reviewed most recent CBC, which shows PLTs are down to 12,000 from 28,000 earlier this week.  His PLT dropped even with increase of prednisone back up to 130mg daily.  Patient denies easier bruising or bleeding.  \par \par 5/26/21\par Patient is here for a follow-up visit for immune thrombocytopenia.  He is feeling well with no new complaints.  Patient denies easier bruising or bleeding.  Since last visit, he was sent to ER for complete workup and received IVIG x 2 days.  During hospital stay, we offered BMBx and to start Rituxan, but patient deferred at that time.  He has been taking Prednisone 130mg daily.  Reviewed most recent CBC which shows PLTs are down to 35,000 from 51,000 earlier this week.  \par \par 5/28/2021- Pt  is here for a follow-up visit for immune thrombocytopenia.  He is feeling well with no new complaints.  Patient denies easier bruising or bleeding.  No Headache no diziness no weakness.   pt was continued on prednisone.  Since last visit, his platelets went from  35,000 to 15,000\par ITP  refractory to current treatment; Discussed risks and benefits of Rituxan including side effects such as fatigue, diarrhea, rashes, hepatitis B reactivation, allergic reactions and death to name a few. Consent obtained.  \par \par 6/1/2021: Patient presents for followup. He started rituxan Friday, 5/28 after decreasing even with increase in steroids. His direct emelia is positive, IgG+, C3 negative.  Hg 13.0, retic is 2.7%. His platelets dropped to 7K. WIll start Romiplastin today.  \par \par 6/4/2021- Patient returns for follow up today for Immune Thrombocytopenia.  Pt was seen on Tuesday 6/1/2021 and Platelets were 7K.  Pt was started on Romiplastin.  He is feeling well.  Denies any bleeding no fatigue no rashes.  CBC done today Platelets now 29K. Pt remains on prednisone no swelling no insomnia. Pt has been trying to walk feeling well, loosing weight.  \par \par 6/8/2021: Mr Dalal returns for followup of ITP. His platelets are now 145. He was started on romiplastim last week and he is continuing with rituxan weekly and prednisone.  He denies bleeding, petechiae, rash, melena, BRBPR\par \par 6/11/21\par Pt is here today for follow up visit for ITP.  His platelets improved, now 163. He was started on romiplastim 6/1/21 and he is continuing with rituxan weekly and prednisone.  He denies bleeding, petechiae, rash, melena, BRBPR.  He is scheduled for bone marrow bx on 6/14/21 at IR and CT scan C/A/P with contrast on 6/17/21.\par \par 6/18/21\par Pt is here today for follow up visit for ITP.  His platelets improved, now 308 from 237, 163) He was started on romiplastim 6/1/21 and he is continuing with rituxan weekly and prednisone. Today is his 4th Rituxan.  He is taking Prednisone 90 mg PO daily. He denies bleeding, petechiae, rash, melena, BRBPR.  \par Bone marrow bx was done at IR on 6/14/21- result pending.  CT scan C/A/P with contrast done yesterday- result pending.\par \par 6/22/21:\par Completed rituxan on 6/18. Tapered to 70mg from 90mg since Saturday . Platelts today are 145, platelets were 340 on Friday. We will increase Nplate to 3mcg/kg and continue to taper steroids. \par \par CT Chest 6/17\par IMPRESSION:\par Decreased size of bilateral axillary lymph nodes now measuring up to 1.3 cm on the left. No mediastinal or hilar lymphadenopathy.\par No CT evidence of an acute intrathoracic pathological process.\par Evidence of multivessel coronary arterial disease.\par Please see separately dictated CT abdomen and pelvis for additional findings.\par \par IMPRESSION:\par Mildly enlarged right retrocrural lymph nodes measuring up to 1.2 cm.\par Infiltrative changes within the retroperitoneum at the level of the aortic bifurcation.\par Above findings are nonspecific, but lymphomatous disease is not excluded\par Indeterminate left upper pole renal lesion with questionable mural nodule. Follow-up CT renal mass protocol is recommended in 3 months to demonstrate stability\par \par DIscussed that were non-specific findings on CT CAP, to be further discussed with his primary hematologist. \par \par BONE MARROW BX done 6/14TH: Pathology recommended repeat biopsys; Molecular analysis pending, cytogenetics pending, FISH pending, \par \par 6/25/21\par Patient is here for a follow-up visit for ITP.   He completed weekly Rituxan on 6.18.2021.  He has been receiving weekly N-plate which was titrated up to 3mcg/kg last week.  He remains on 60mg prednisone daily.  Reviewed most recent CBC which shows leukocytosis (likely 2/2 steroid), mild anemia and reoccurrence of thrombocytopenia.  PLTs dropped to 73,000 from 145,000 last week.  He denies any fevers, chills, abdominal pain, early satiety or bleeding.  He notes same old bruising on extremities but nothing new. \par \par 6/29/21\par Mr. Dalal returns for followup. On Friday his platelets dropped to 73, he remained on Nplate 3mcg/kg and 60 mg at that time. Today his platelets improved to 150. We discussed that he had a few enlarged lymph nodes on CT and recommended PET. Also recommended to decrease prednisone to 40mg QD and continue current Nplate dose. \par \par 7/9/21\par Patient is here for a follow-up visit for ITP.   He completed weekly Rituxan on 6.18.2021.  He has been receiving weekly N-plate at 3mcg/kg, last dose was earlier this week.  He has been tolerating 30mg prednisone daily for the last few days.  Reviewed most recent CBC which shows no anemia and PLTs essentially stable at 208,000.  He denies any fevers, chills, abdominal pain, early satiety or bleeding.

## 2021-07-13 ENCOUNTER — OUTPATIENT (OUTPATIENT)
Dept: OUTPATIENT SERVICES | Facility: HOSPITAL | Age: 66
LOS: 1 days | Discharge: HOME | End: 2021-07-13

## 2021-07-13 ENCOUNTER — APPOINTMENT (OUTPATIENT)
Dept: HEMATOLOGY ONCOLOGY | Facility: CLINIC | Age: 66
End: 2021-07-13
Payer: MEDICARE

## 2021-07-13 ENCOUNTER — APPOINTMENT (OUTPATIENT)
Dept: INFUSION THERAPY | Facility: CLINIC | Age: 66
End: 2021-07-13
Payer: MEDICARE

## 2021-07-13 ENCOUNTER — LABORATORY RESULT (OUTPATIENT)
Age: 66
End: 2021-07-13

## 2021-07-13 VITALS
SYSTOLIC BLOOD PRESSURE: 127 MMHG | DIASTOLIC BLOOD PRESSURE: 82 MMHG | WEIGHT: 278 LBS | BODY MASS INDEX: 44.68 KG/M2 | TEMPERATURE: 98.4 F | HEIGHT: 66 IN | RESPIRATION RATE: 14 BRPM | HEART RATE: 98 BPM

## 2021-07-13 DIAGNOSIS — Z98.890 OTHER SPECIFIED POSTPROCEDURAL STATES: Chronic | ICD-10-CM

## 2021-07-13 DIAGNOSIS — D69.3 IMMUNE THROMBOCYTOPENIC PURPURA: ICD-10-CM

## 2021-07-13 DIAGNOSIS — R59.9 ENLARGED LYMPH NODES, UNSPECIFIED: ICD-10-CM

## 2021-07-13 LAB
HCT VFR BLD CALC: 46.6 %
HGB BLD-MCNC: 14.9 G/DL
MCHC RBC-ENTMCNC: 24.8 PG
MCHC RBC-ENTMCNC: 32 G/DL
MCV RBC AUTO: 77.7 FL
PLATELET # BLD AUTO: 187 K/UL
PMV BLD: 11 FL
RBC # BLD: 6 M/UL
RBC # FLD: 20.3 %
WBC # FLD AUTO: 9.63 K/UL

## 2021-07-13 PROCEDURE — 99214 OFFICE O/P EST MOD 30 MIN: CPT

## 2021-07-13 RX ORDER — ROMIPLOSTIM 250 UG/.5ML
250 INJECTION, POWDER, LYOPHILIZED, FOR SOLUTION SUBCUTANEOUS ONCE
Refills: 0 | Status: COMPLETED | OUTPATIENT
Start: 2021-07-13 | End: 2021-07-13

## 2021-07-13 RX ADMIN — ROMIPLOSTIM 250 MICROGRAM(S): 250 INJECTION, POWDER, LYOPHILIZED, FOR SOLUTION SUBCUTANEOUS at 13:00

## 2021-07-13 NOTE — PHYSICAL EXAM
[Restricted in physically strenuous activity but ambulatory and able to carry out work of a light or sedentary nature] : Status 1- Restricted in physically strenuous activity but ambulatory and able to carry out work of a light or sedentary nature, e.g., light house work, office work [Obese] : obese [Normal] : affect appropriate [de-identified] : no bleed  [de-identified] : no mucocutaneous bleed, some small bruising noted on extremities

## 2021-07-13 NOTE — REVIEW OF SYSTEMS
[Easy Bruising] : a tendency for easy bruising [Negative] : Allergic/Immunologic [Chills] : chills [Fever] : no fever [Night Sweats] : no night sweats [Fatigue] : no fatigue [Recent Change In Weight] : ~T no recent weight change [Vision Problems] : no vision problems [Nosebleeds] : no nosebleeds [Hoarseness] : no hoarseness [Palpitations] : no palpitations [Leg Claudication] : no intermittent leg claudication [Lower Ext Edema] : no lower extremity edema [Shortness Of Breath] : no shortness of breath [Wheezing] : no wheezing [Cough] : no cough [SOB on Exertion] : no shortness of breath during exertion [Vomiting] : no vomiting [Diarrhea] : no diarrhea [Joint Pain] : no joint pain [Joint Stiffness] : no joint stiffness [Muscle Pain] : no muscle pain [Easy Bleeding] : no tendency for easy bleeding [FreeTextEntry2] : chills x 1 day

## 2021-07-13 NOTE — ASSESSMENT
[FreeTextEntry1] : 67 yo with thrombocytopenia; 5K noted on 5.7.21. Review of the trend revelaed that pt has isolated thrombocytopenia of unclear etiology, that has started several months ago, exacerbated in winter 2021 (counts have decreased from 100K/ June 2020 to 50K/ January 2021 to 5k/ May 2021). Hamlet completed both doses of  MODERNA several weeks prior to the hospital presentation. \par \par Peripheral smear did not note schistocytes, spherocytes, acanthocytes, polychromasia; no dysplastic immature cells; labs did not reveal evidence of hemolysis. \par \par Differential diagnosis includes;\par - idiopathic ITP\par - modernal caused ITP\par - NHL caused ITP\par - CTD associated ITP (ANA1:360)\par - MDS\par \par started on IV IG 1G/kg daily x2 (5/7/21-5/8/21) and on prednisone 1mg/kg beginning 5/7/21. He had a good response to IVIG 1 G/KG X2 and prednisone 1mg/kg with normalization of platelets count; upon slow tapering of steroids, platelets count nadired to 12K: he was re-challenged with iVIG 1G/KG x2 and prednisone dose was increased back to 1mg/kg: the effect is humble since platelets count has not normalized but decreased to 30sK\par \par Platelets counts improved to 116K (5/11/21) and \par 133K (5/14/21), when the dose of prednisone was decreased from 130mg to 100mg daily: \par 5/18/21: 28K , and dose of prednisone was increased back up to 130mg daily\par 5/21/21: 12K (started IVIG x 2 days and continued prednisone 130mg daily)\par 5/24/21: 51K\par 5/26/21: 35K\par 5/28/21: 16K\par 6/01/21: 7K (started romiplastim)\par 6/04/21:  29 K\par 6/08/21: 138K\par 6/11/21:  163K\par 6/15/21: 237K\par 6/18/21: 300K\par 6/22/21: 145K\par 6/24/21: 72K\par 6/29/21: 150K\par 7/7/21: 251K \par 7/9/21: 208K\par 7/13/21:  187K\par \par IMPRESSION; REFRACTORY ITP\par - since his disease was refractory to sterids, recommended to change the treatment:\par a) continuing steroids (prednisone 1mg/kg daily), now tapering\par b) s/p IVIG 1G/KG daily x 2 as of 5/22/21\par b) he was started on rituxian/CD20 monoclonal antibody weekly x4: first weekly dose 5.28.21 outpatient\par c) NPLATE was initiated on 6.4.21\par \par - rheum eval in view of +HORACIO\par - GIVEN the age of onset of isolated thrombocytopenia, s/p MDS Bone marrow bx performed 6/14\par \par  6.1.21: His platelet count decreased to 7K. \par PBS  reviewed; 0-1 platelets on most of the 100x HPF; no schistocytes  no spherocytes; no polychromasia; no acanthocytes\par He started on Rituxan 5/28 with good response.\par \par A) We will taper by 1mcg/kg if he has 2 consecutive platelet counts over 150-200K.\par B) If platelet count < 50K then will increase by 1mcg/kg.  \par C) started Nplate on 6.1.21, WEEKLY (while waiting for improvement from Rituxan).  \par  \par advised if any subtle signs of CVA / mucocutaneous bleed, immediately call 911 and get to ER\par \par - He was on prednisone 130mg daily, tapered to 110mg on 6/11, tapered to 90mg 6/16, 70mg QD 6/19 QD, dropped to 60mg QD 6/23 then tapered down to 40mg and then on 30mg daily; tapered down to 20mg daily starting 7/10 and now will taper to 15mg starting today 7/13\par - Nplate to be tapered to 2mcg/kg since PLTs are within normal limits, close to 200k\par - Bone marrow biopsy with [facility] June 14: aspicular, MDS FISH panel is negative, \par - CT CAP 6/17/21: Non specific findings, however infiltrative area seen near bifurcation of aorta: recommended PET scan \par \par #Renal cyst, noted on CT A/P from 06/2021\par - briefly discussed CT scan results which show decrease in prior axillary adenopathy; to be reviewed by Dr. Rondon when he returns\par - Needs CT renal mass protocol in 3 months to ensure stability (due September 2021)\par \par #Possible triple vessel disease on CT:\par - PMD and cardio f/u\par \par Plan:\par -7/13/21 reduce NPlate to 2mcg/kg since PLTs are within normal limits at 187K\par -7/13/21 Taper prednisone from 20mg to 15mg QD starting 7/13\par - c/w pantoprazole 40mg daily, at least for the duration while taking high dose steroids\par - May need repeat bone marrow, results non-diagnostic; FISH negative\par -  PET scan to evaluate adenopathy on ct scan, still pending\par \par SUMMARY: \par Hamlet has refractory thrombocytopenia, likely immune. \par He responded well to rituxan and NPLATE, that were initiated in addition to high dose steroids:\par a) prednisone was tapered frorm 1mg/kg (130mg total daily dose ), decreased to 30mg QD starting 7.8.21: tapering should be every 4-5 days by 10mg from the last daily dose.  On 7.10.21 he decreased to 20mg daily and now he will start with 15mg daily starting 7.13.21\par b) rituxan : completed the 4th weekly dose on 6.18.21\par c) Nplate started at 2mcg/kg, increased to 3mcg/kg on 6/22; decrease back to 2mcg/kg starting 7.13.21\par d) CT showed a single 1.2 retrocural lymph node and infiltrative changes\par e) Bone marrow was aspicular, increased megakaryocytes but on Nplate, cytogenetics and FISH are negative\par \par RTC 7.16.21 with CBC, BMP, LFTs, HbA1c and then every Tuesday and Friday with CBC

## 2021-07-13 NOTE — CONSULT LETTER
[Dear  ___] : Dear  [unfilled], [Consult Letter:] : I had the pleasure of evaluating your patient, [unfilled]. [Please see my note below.] : Please see my note below. [Sincerely,] : Sincerely, [FreeTextEntry3] : Elvis Rondon

## 2021-07-13 NOTE — HISTORY OF PRESENT ILLNESS
[de-identified] : Mr. Dalal is a 66 year old male with PMH of DM, HTN, DLD, MAICOL who was recently admitted for suspected ITP. Patient was noted to have asymptomatic thrombocytopenia as an outpatient and referred to the ED. In the ED his platelet count was 5K with normal WBC and Hg. His smear was reviewed and showed 1-2 schistocytes, no clumping, large platelets, decrease number of platelets, normal WBC morphology/no blasts, some target cells. He was started on prednisone 1 mg/kg of prednisone, and he received two doses of IVIG. His platelets improved to 38 and he was discharged on prednisone. \par Of note, his platelets were 106 in June 2020, ad then 53 in January 2021. Also in January and then in February he received the Moderna vaccine. He does have high RBC count (6.45) and low MCV (73).\par Workup in the hospital showed normal US spleen, no evidence of hemolysis, normal LDH, hapto and ret count, HIV, HepB, HepC EBV IgM, CMV IgM NR , HIT negative , RF negative, peripheral flow negative\par \par Patient informed us he was not discharged with needles for his insulin. His fingersticks have in the low 200s. \par His platelets today are 116. No bleeding, bruising or petechiae. \par \par 5/14/2021- pt returns for follow up today.  feeling well.  some mild bruising where labs drawn otherwise no other bruising.  wbc 10.49 hgb 13.5 hct 23.0 platelets now 133. \par pt made f/up appt with new fer Doss for this monday 5/17/2021. P t checking FS at home sugar 130 today.   [FreeTextEntry1] : Started weekly Ritxuan on 5.28.2021.  Completed on 6.18.2021.   [de-identified] : 5/18/21: Mr. Dalal returns for followup. Since Saturday he has been taking 100mg prednsone daily, he was on 130mg prior to this. No evidence of bleeding, petechiae. His platelets dropped to 28K. We reviewed the smear and there was no clumping, 2-3 platelets per HPF. We told him to take an additional 30mg today.\par \par 5/21/21\par Patient is here for a follow-up visit for immune thrombocytopenia.  He is feeling well with no new complaints.  Reviewed most recent CBC, which shows PLTs are down to 12,000 from 28,000 earlier this week.  His PLT dropped even with increase of prednisone back up to 130mg daily.  Patient denies easier bruising or bleeding.  \par \par 5/26/21\par Patient is here for a follow-up visit for immune thrombocytopenia.  He is feeling well with no new complaints.  Patient denies easier bruising or bleeding.  Since last visit, he was sent to ER for complete workup and received IVIG x 2 days.  During hospital stay, we offered BMBx and to start Rituxan, but patient deferred at that time.  He has been taking Prednisone 130mg daily.  Reviewed most recent CBC which shows PLTs are down to 35,000 from 51,000 earlier this week.  \par \par 5/28/2021- Pt  is here for a follow-up visit for immune thrombocytopenia.  He is feeling well with no new complaints.  Patient denies easier bruising or bleeding.  No Headache no diziness no weakness.   pt was continued on prednisone.  Since last visit, his platelets went from  35,000 to 15,000\par ITP  refractory to current treatment; Discussed risks and benefits of Rituxan including side effects such as fatigue, diarrhea, rashes, hepatitis B reactivation, allergic reactions and death to name a few. Consent obtained.  \par \par 6/1/2021: Patient presents for followup. He started rituxan Friday, 5/28 after decreasing even with increase in steroids. His direct emelia is positive, IgG+, C3 negative.  Hg 13.0, retic is 2.7%. His platelets dropped to 7K. WIll start Romiplastin today.  \par \par 6/4/2021- Patient returns for follow up today for Immune Thrombocytopenia.  Pt was seen on Tuesday 6/1/2021 and Platelets were 7K.  Pt was started on Romiplastin.  He is feeling well.  Denies any bleeding no fatigue no rashes.  CBC done today Platelets now 29K. Pt remains on prednisone no swelling no insomnia. Pt has been trying to walk feeling well, loosing weight.  \par \par 6/8/2021: Mr Dalal returns for followup of ITP. His platelets are now 145. He was started on romiplastim last week and he is continuing with rituxan weekly and prednisone.  He denies bleeding, petechiae, rash, melena, BRBPR\par \par 6/11/21\par Pt is here today for follow up visit for ITP.  His platelets improved, now 163. He was started on romiplastim 6/1/21 and he is continuing with rituxan weekly and prednisone.  He denies bleeding, petechiae, rash, melena, BRBPR.  He is scheduled for bone marrow bx on 6/14/21 at IR and CT scan C/A/P with contrast on 6/17/21.\par \par 6/18/21\par Pt is here today for follow up visit for ITP.  His platelets improved, now 308 from 237, 163) He was started on romiplastim 6/1/21 and he is continuing with rituxan weekly and prednisone. Today is his 4th Rituxan.  He is taking Prednisone 90 mg PO daily. He denies bleeding, petechiae, rash, melena, BRBPR.  \par Bone marrow bx was done at [facility] on 6/14/21- result pending.  CT scan C/A/P with contrast done yesterday- result pending.\par \par 6/22/21:\par Completed rituxan on 6/18. Tapered to 70mg from 90mg since Saturday . Platelts today are 145, platelets were 340 on Friday. We will increase Nplate to 3mcg/kg and continue to taper steroids. \par \par CT Chest 6/17\par IMPRESSION:\par Decreased size of bilateral axillary lymph nodes now measuring up to 1.3 cm on the left. No mediastinal or hilar lymphadenopathy.\par No CT evidence of an acute intrathoracic pathological process.\par Evidence of multivessel coronary arterial disease.\par Please see separately dictated CT abdomen and pelvis for additional findings.\par \par IMPRESSION:\par Mildly enlarged right retrocrural lymph nodes measuring up to 1.2 cm.\par Infiltrative changes within the retroperitoneum at the level of the aortic bifurcation.\par Above findings are nonspecific, but lymphomatous disease is not excluded\par Indeterminate left upper pole renal lesion with questionable mural nodule. Follow-up CT renal mass protocol is recommended in 3 months to demonstrate stability\par \par DIscussed that were non-specific findings on CT CAP, to be further discussed with his primary hematologist. \par \par BONE MARROW BX done 6/14TH: Pathology recommended repeat biopsys; Molecular analysis pending, cytogenetics pending, FISH pending, \par \par 6/25/21\par Patient is here for a follow-up visit for ITP.   He completed weekly Rituxan on 6.18.2021.  He has been receiving weekly N-plate which was titrated up to 3mcg/kg last week.  He remains on 60mg prednisone daily.  Reviewed most recent CBC which shows leukocytosis (likely 2/2 steroid), mild anemia and reoccurrence of thrombocytopenia.  PLTs dropped to 73,000 from 145,000 last week.  He denies any fevers, chills, abdominal pain, early satiety or bleeding.  He notes same old bruising on extremities but nothing new. \par \par 6/29/21\par Mr. Dalal returns for followup. On Friday his platelets dropped to 73, he remained on Nplate 3mcg/kg and 60 mg at that time. Today his platelets improved to 150. We discussed that he had a few enlarged lymph nodes on CT and recommended PET. Also recommended to decrease prednisone to 40mg QD and continue current Nplate dose. \par \par 7/9/21\par Patient is here for a follow-up visit for ITP.   He completed weekly Rituxan on 6.18.2021.  He has been receiving weekly N-plate at 3mcg/kg, last dose was earlier this week.  He has been tolerating 30mg prednisone daily for the last few days.  Reviewed most recent CBC which shows no anemia and PLTs essentially stable at 208,000.  He denies any fevers, chills, abdominal pain, early satiety or bleeding. \par \par 7/13/21\par Patient is here for a follow-up visit for ITP.   He completed weekly Rituxan on 6.18.2021.  He has been receiving weekly N-plate, due again today  He has been tolerating 20mg prednisone daily for the last few days.  Reviewed most recent CBC which shows no anemia and PLTs essentially stable at 187,000.  He denies any fevers, abdominal pain, early satiety or bleeding. He does report intermittent chills today and some yesterday, but notes that he has not had any fevers and it is cold in the clinic.

## 2021-07-16 ENCOUNTER — INPATIENT (INPATIENT)
Facility: HOSPITAL | Age: 66
LOS: 0 days | Discharge: LEFT BEFORE DISCHARGE | End: 2021-07-16
Attending: INTERNAL MEDICINE | Admitting: INTERNAL MEDICINE
Payer: MEDICARE

## 2021-07-16 ENCOUNTER — APPOINTMENT (OUTPATIENT)
Dept: HEMATOLOGY ONCOLOGY | Facility: CLINIC | Age: 66
End: 2021-07-16

## 2021-07-16 ENCOUNTER — APPOINTMENT (OUTPATIENT)
Dept: INFUSION THERAPY | Facility: CLINIC | Age: 66
End: 2021-07-16

## 2021-07-16 VITALS
HEART RATE: 126 BPM | OXYGEN SATURATION: 89 % | HEIGHT: 68 IN | WEIGHT: 279.99 LBS | RESPIRATION RATE: 20 BRPM | SYSTOLIC BLOOD PRESSURE: 100 MMHG | TEMPERATURE: 100 F | DIASTOLIC BLOOD PRESSURE: 60 MMHG

## 2021-07-16 VITALS
HEART RATE: 105 BPM | TEMPERATURE: 103 F | RESPIRATION RATE: 24 BRPM | SYSTOLIC BLOOD PRESSURE: 99 MMHG | DIASTOLIC BLOOD PRESSURE: 59 MMHG | OXYGEN SATURATION: 96 %

## 2021-07-16 DIAGNOSIS — Z98.890 OTHER SPECIFIED POSTPROCEDURAL STATES: Chronic | ICD-10-CM

## 2021-07-16 LAB
ALBUMIN SERPL ELPH-MCNC: 3.3 G/DL — LOW (ref 3.5–5.2)
ALP SERPL-CCNC: 49 U/L — SIGNIFICANT CHANGE UP (ref 30–115)
ALT FLD-CCNC: 21 U/L — SIGNIFICANT CHANGE UP (ref 0–41)
ANION GAP SERPL CALC-SCNC: 17 MMOL/L — HIGH (ref 7–14)
AST SERPL-CCNC: 51 U/L — HIGH (ref 0–41)
BASE EXCESS BLDV CALC-SCNC: 4.9 MMOL/L — HIGH (ref -2–2)
BASOPHILS # BLD AUTO: 0.09 K/UL — SIGNIFICANT CHANGE UP (ref 0–0.2)
BASOPHILS NFR BLD AUTO: 1 % — SIGNIFICANT CHANGE UP (ref 0–1)
BILIRUB SERPL-MCNC: 0.7 MG/DL — SIGNIFICANT CHANGE UP (ref 0.2–1.2)
BUN SERPL-MCNC: 25 MG/DL — HIGH (ref 10–20)
CALCIUM SERPL-MCNC: 8.8 MG/DL — SIGNIFICANT CHANGE UP (ref 8.5–10.1)
CHLORIDE SERPL-SCNC: 100 MMOL/L — SIGNIFICANT CHANGE UP (ref 98–110)
CO2 SERPL-SCNC: 25 MMOL/L — SIGNIFICANT CHANGE UP (ref 17–32)
CREAT SERPL-MCNC: 1.2 MG/DL — SIGNIFICANT CHANGE UP (ref 0.7–1.5)
D DIMER BLD IA.RAPID-MCNC: 123 NG/ML DDU — SIGNIFICANT CHANGE UP (ref 0–230)
D DIMER BLD IA.RAPID-MCNC: 160 NG/ML DDU — SIGNIFICANT CHANGE UP (ref 0–230)
EOSINOPHIL # BLD AUTO: 0.12 K/UL — SIGNIFICANT CHANGE UP (ref 0–0.7)
EOSINOPHIL NFR BLD AUTO: 1.3 % — SIGNIFICANT CHANGE UP (ref 0–8)
GAS PNL BLDV: SIGNIFICANT CHANGE UP
GLUCOSE BLDC GLUCOMTR-MCNC: 121 MG/DL — HIGH (ref 70–99)
GLUCOSE SERPL-MCNC: 141 MG/DL — HIGH (ref 70–99)
HCO3 BLDV-SCNC: 28 MMOL/L — SIGNIFICANT CHANGE UP (ref 22–29)
HCT VFR BLD CALC: 40.9 % — LOW (ref 42–52)
HGB BLD-MCNC: 12.6 G/DL — LOW (ref 14–18)
IMM GRANULOCYTES NFR BLD AUTO: 2 % — HIGH (ref 0.1–0.3)
LACTATE BLDV-MCNC: 4.5 MMOL/L — HIGH (ref 0.5–1.6)
LYMPHOCYTES # BLD AUTO: 0.85 K/UL — LOW (ref 1.2–3.4)
LYMPHOCYTES # BLD AUTO: 9.4 % — LOW (ref 20.5–51.1)
MCHC RBC-ENTMCNC: 23.7 PG — LOW (ref 27–31)
MCHC RBC-ENTMCNC: 30.8 G/DL — LOW (ref 32–37)
MCV RBC AUTO: 76.9 FL — LOW (ref 80–94)
MONOCYTES # BLD AUTO: 0.51 K/UL — SIGNIFICANT CHANGE UP (ref 0.1–0.6)
MONOCYTES NFR BLD AUTO: 5.6 % — SIGNIFICANT CHANGE UP (ref 1.7–9.3)
NEUTROPHILS # BLD AUTO: 7.3 K/UL — HIGH (ref 1.4–6.5)
NEUTROPHILS NFR BLD AUTO: 80.7 % — HIGH (ref 42.2–75.2)
NRBC # BLD: 0 /100 WBCS — SIGNIFICANT CHANGE UP (ref 0–0)
NT-PROBNP SERPL-SCNC: 177 PG/ML — SIGNIFICANT CHANGE UP (ref 0–300)
PCO2 BLDV: 34 MMHG — LOW (ref 41–51)
PH BLDV: 7.52 — HIGH (ref 7.26–7.43)
PLATELET # BLD AUTO: 240 K/UL — SIGNIFICANT CHANGE UP (ref 130–400)
PO2 BLDV: 67 MMHG — HIGH (ref 20–40)
POTASSIUM SERPL-MCNC: 4.8 MMOL/L — SIGNIFICANT CHANGE UP (ref 3.5–5)
POTASSIUM SERPL-SCNC: 4.8 MMOL/L — SIGNIFICANT CHANGE UP (ref 3.5–5)
PROT SERPL-MCNC: 6 G/DL — SIGNIFICANT CHANGE UP (ref 6–8)
RBC # BLD: 5.32 M/UL — SIGNIFICANT CHANGE UP (ref 4.7–6.1)
RBC # FLD: 19.9 % — HIGH (ref 11.5–14.5)
SAO2 % BLDV: 94 % — SIGNIFICANT CHANGE UP
SARS-COV-2 RNA SPEC QL NAA+PROBE: SIGNIFICANT CHANGE UP
SODIUM SERPL-SCNC: 142 MMOL/L — SIGNIFICANT CHANGE UP (ref 135–146)
TROPONIN T SERPL-MCNC: <0.01 NG/ML — SIGNIFICANT CHANGE UP
WBC # BLD: 9.05 K/UL — SIGNIFICANT CHANGE UP (ref 4.8–10.8)
WBC # FLD AUTO: 9.05 K/UL — SIGNIFICANT CHANGE UP (ref 4.8–10.8)

## 2021-07-16 PROCEDURE — 93970 EXTREMITY STUDY: CPT | Mod: 26

## 2021-07-16 PROCEDURE — 93010 ELECTROCARDIOGRAM REPORT: CPT | Mod: 77

## 2021-07-16 PROCEDURE — 71250 CT THORAX DX C-: CPT | Mod: 26

## 2021-07-16 PROCEDURE — 99222 1ST HOSP IP/OBS MODERATE 55: CPT

## 2021-07-16 PROCEDURE — 99223 1ST HOSP IP/OBS HIGH 75: CPT

## 2021-07-16 PROCEDURE — 99284 EMERGENCY DEPT VISIT MOD MDM: CPT

## 2021-07-16 PROCEDURE — 71045 X-RAY EXAM CHEST 1 VIEW: CPT | Mod: 26

## 2021-07-16 PROCEDURE — 93010 ELECTROCARDIOGRAM REPORT: CPT

## 2021-07-16 RX ORDER — IBUPROFEN 200 MG
600 TABLET ORAL ONCE
Refills: 0 | Status: COMPLETED | OUTPATIENT
Start: 2021-07-16 | End: 2021-07-16

## 2021-07-16 RX ORDER — LANOLIN ALCOHOL/MO/W.PET/CERES
3 CREAM (GRAM) TOPICAL AT BEDTIME
Refills: 0 | Status: DISCONTINUED | OUTPATIENT
Start: 2021-07-16 | End: 2021-07-16

## 2021-07-16 RX ORDER — AMLODIPINE BESYLATE 2.5 MG/1
1 TABLET ORAL
Qty: 0 | Refills: 0 | DISCHARGE

## 2021-07-16 RX ORDER — AZITHROMYCIN 500 MG/1
500 TABLET, FILM COATED ORAL ONCE
Refills: 0 | Status: COMPLETED | OUTPATIENT
Start: 2021-07-16 | End: 2021-07-16

## 2021-07-16 RX ORDER — IPRATROPIUM/ALBUTEROL SULFATE 18-103MCG
3 AEROSOL WITH ADAPTER (GRAM) INHALATION ONCE
Refills: 0 | Status: COMPLETED | OUTPATIENT
Start: 2021-07-16 | End: 2021-07-16

## 2021-07-16 RX ORDER — CEFTRIAXONE 500 MG/1
1000 INJECTION, POWDER, FOR SOLUTION INTRAMUSCULAR; INTRAVENOUS ONCE
Refills: 0 | Status: COMPLETED | OUTPATIENT
Start: 2021-07-16 | End: 2021-07-16

## 2021-07-16 RX ORDER — ONDANSETRON 8 MG/1
4 TABLET, FILM COATED ORAL EVERY 8 HOURS
Refills: 0 | Status: DISCONTINUED | OUTPATIENT
Start: 2021-07-16 | End: 2021-07-16

## 2021-07-16 RX ORDER — IPRATROPIUM/ALBUTEROL SULFATE 18-103MCG
3 AEROSOL WITH ADAPTER (GRAM) INHALATION EVERY 4 HOURS
Refills: 0 | Status: DISCONTINUED | OUTPATIENT
Start: 2021-07-16 | End: 2021-07-16

## 2021-07-16 RX ORDER — ASPIRIN/CALCIUM CARB/MAGNESIUM 324 MG
1 TABLET ORAL
Qty: 0 | Refills: 0 | DISCHARGE

## 2021-07-16 RX ORDER — ACETAMINOPHEN 500 MG
650 TABLET ORAL EVERY 6 HOURS
Refills: 0 | Status: DISCONTINUED | OUTPATIENT
Start: 2021-07-16 | End: 2021-07-16

## 2021-07-16 RX ORDER — LOSARTAN POTASSIUM 100 MG/1
100 TABLET, FILM COATED ORAL DAILY
Refills: 0 | Status: DISCONTINUED | OUTPATIENT
Start: 2021-07-16 | End: 2021-07-16

## 2021-07-16 RX ORDER — HYDROCHLOROTHIAZIDE 25 MG
25 TABLET ORAL DAILY
Refills: 0 | Status: DISCONTINUED | OUTPATIENT
Start: 2021-07-16 | End: 2021-07-16

## 2021-07-16 RX ORDER — BUDESONIDE AND FORMOTEROL FUMARATE DIHYDRATE 160; 4.5 UG/1; UG/1
2 AEROSOL RESPIRATORY (INHALATION)
Refills: 0 | Status: DISCONTINUED | OUTPATIENT
Start: 2021-07-16 | End: 2021-07-16

## 2021-07-16 RX ORDER — AMLODIPINE BESYLATE 2.5 MG/1
10 TABLET ORAL DAILY
Refills: 0 | Status: DISCONTINUED | OUTPATIENT
Start: 2021-07-16 | End: 2021-07-16

## 2021-07-16 RX ORDER — ATORVASTATIN CALCIUM 80 MG/1
40 TABLET, FILM COATED ORAL AT BEDTIME
Refills: 0 | Status: DISCONTINUED | OUTPATIENT
Start: 2021-07-16 | End: 2021-07-16

## 2021-07-16 RX ORDER — ENOXAPARIN SODIUM 100 MG/ML
40 INJECTION SUBCUTANEOUS
Refills: 0 | Status: DISCONTINUED | OUTPATIENT
Start: 2021-07-16 | End: 2021-07-16

## 2021-07-16 RX ORDER — PANTOPRAZOLE SODIUM 20 MG/1
40 TABLET, DELAYED RELEASE ORAL
Refills: 0 | Status: DISCONTINUED | OUTPATIENT
Start: 2021-07-16 | End: 2021-07-16

## 2021-07-16 RX ADMIN — Medication 3 MILLILITER(S): at 04:51

## 2021-07-16 RX ADMIN — Medication 600 MILLIGRAM(S): at 04:23

## 2021-07-16 RX ADMIN — AZITHROMYCIN 500 MILLIGRAM(S): 500 TABLET, FILM COATED ORAL at 13:36

## 2021-07-16 RX ADMIN — Medication 1 TABLET(S): at 12:08

## 2021-07-16 RX ADMIN — Medication 600 MILLIGRAM(S): at 07:30

## 2021-07-16 RX ADMIN — Medication 650 MILLIGRAM(S): at 14:44

## 2021-07-16 RX ADMIN — CEFTRIAXONE 1000 MILLIGRAM(S): 500 INJECTION, POWDER, FOR SOLUTION INTRAMUSCULAR; INTRAVENOUS at 13:36

## 2021-07-16 RX ADMIN — CEFTRIAXONE 100 MILLIGRAM(S): 500 INJECTION, POWDER, FOR SOLUTION INTRAMUSCULAR; INTRAVENOUS at 06:45

## 2021-07-16 RX ADMIN — AZITHROMYCIN 255 MILLIGRAM(S): 500 TABLET, FILM COATED ORAL at 06:45

## 2021-07-16 NOTE — CONSULT NOTE ADULT - ASSESSMENT
IMPRESSION:    Probable Bilateral Atypical Pneumonia  Possible PCP?  HO ITP recent IVIG currently on prednisone taper  Active smoker    PLAN:    CNS: No sedation    HEENT: Oral care.     PULMONARY: HOB >45. O2 as needed to maintain spo2>92%. Please obtain CT Chest noncon. May need Bronchoscopy. Nebs prn    CARDIOVASCULAR: keep i=o.     GI: GI prophylaxis.  Feeding as tolerated    RENAL: Fu lytes.     INFECTIOUS DISEASE: Start Levaquin. Start Bactrim prophylaxis for PCP. Follow up cultures.    HEMATOLOGICAL:  DVT prophylaxis. Continue prednisone taper per hematology    ENDOCRINE:  Follow up FS.  Insulin protocol if needed.    MUSCULOSKELETAL: OOb to chair    Downgrade to floor     IMPRESSION:    Probable Bilateral Atypical Pneumonia  Possible PCP?  HO ITP recent IVIG currently on prolonged prednisone taper  Active smoker    PLAN:    CNS: No sedation    HEENT: Oral care.     PULMONARY: HOB >45. O2 as needed to maintain spo2>92%. Please obtain CT Chest noncon. May need Bronchoscopy. DC prednisone and start Solumedrol 30mg IV q12h for now.     CARDIOVASCULAR: keep i=o.     GI: GI prophylaxis.  Feeding as tolerated    RENAL: Fu lytes.     INFECTIOUS DISEASE: Start Levaquin. Start Bactrim dose for PCP. Follow up cultures. Check urine legionella and strep.     HEMATOLOGICAL:  DVT prophylaxis. Check LDH    ENDOCRINE:  Follow up FS.  Insulin protocol if needed.    MUSCULOSKELETAL: OOb to chair    SDU monitoring     IMPRESSION:    Probable Bilateral Atypical Pneumonia  Possible PCP?  HO ITP recent IVIG currently on prolonged prednisone taper  Active smoker    PLAN:    CNS: No sedation    HEENT: Oral care.     PULMONARY: HOB >45. O2 as needed to maintain spo2>92%. Please obtain CT Chest noncon. May need Bronchoscopy. Switch prednisone to Solumedrol 30mg IV q12h for now    CARDIOVASCULAR: keep i=o.     GI: GI prophylaxis.  Feeding as tolerated    RENAL: Fu lytes.     INFECTIOUS DISEASE: Start Levaquin. Start Bactrim dose for PCP. Follow up cultures. Check urine legionella and strep. Check FULL RVP panel. Check Fungitell and procal. Check LDH.    HEMATOLOGICAL:  DVT prophylaxis.     ENDOCRINE:  Follow up FS.  Insulin protocol if needed.    MUSCULOSKELETAL: OOb to chair    SDU monitoring     IMPRESSION:    Probable Bilateral Atypical Pneumonia  Possible PCP?  HO ITP recent IVIG currently on prolonged prednisone taper  Active smoker    PLAN:    CNS: No sedation    HEENT: Oral care.     PULMONARY: HOB >45. O2 as needed to maintain spo2>92%. Please obtain CT Chest noncon. May need Bronchoscopy. Switch prednisone to Solumedrol 30mg IV q12h for now    CARDIOVASCULAR: keep i=o. Gentle IVF hydration    GI: GI prophylaxis.  Feeding as tolerated    RENAL: Fu lytes.     INFECTIOUS DISEASE: Start Levaquin. Start Bactrim dose for PCP. Follow up cultures. Check urine legionella and strep. Check FULL RVP panel. Check Fungitell and procal. Check LDH.    HEMATOLOGICAL:  DVT prophylaxis.     ENDOCRINE:  Follow up FS.  Insulin protocol if needed.    MUSCULOSKELETAL: OOb to chair    SDU monitoring     IMPRESSION:    Pneumonia  Possible PCP?  HO ITP recent IVIG currently on prolonged prednisone taper  Active smoker  HO COPD    PLAN:    CNS: No sedation    HEENT: Oral care.     PULMONARY: HOB >45. O2 as needed to maintain spo2>92%. Please obtain CT Chest NC.  Possible Bronchoscopy next week.  Switch prednisone to Solumedrol 40mg IV q12h for now    CARDIOVASCULAR: Avoid volume overload     GI: GI prophylaxis.  Feeding as tolerated    RENAL: Fu lytes.     INFECTIOUS DISEASE: Start Levaquin. Start Bactrim dose for PCP. Follow up cultures. Check urine legionella and strep. Check FULL RVP panel. Check Fungitell and procal. Check LDH.    HEMATOLOGICAL:  DVT prophylaxis. DW hem Onc Dr. Benites     ENDOCRINE:  Follow up FS.  I    MUSCULOSKELETAL: OOb to chair    SDU monitoring

## 2021-07-16 NOTE — H&P ADULT - NSHPPHYSICALEXAM_GEN_ALL_CORE
GENERAL: No acute distress, obese   HEAD:  Atraumatic, Normocephalic  EYES: EOMI, PERRLA, conjunctiva and sclera clear  NECK: Supple, no lymphadenopathy, no JVD  CHEST/LUNG: CTAB; No wheezes, rhonchi. Crackles b/l, on 5L NC  HEART: Regular rate and rhythm; No murmurs, rubs, or gallops  ABDOMEN: Soft, non-tender, distended; normal bowel sounds, no organomegaly  EXTREMITIES:  2+ peripheral pulses b/l, No clubbing, cyanosis, edema b/l  NEUROLOGY: A&O x 3, no focal deficits  SKIN: venous stasis changes b/l, dry, scaly skin

## 2021-07-16 NOTE — ED PROVIDER NOTE - CLINICAL SUMMARY MEDICAL DECISION MAKING FREE TEXT BOX
Pt presents with SOB and hypoxia. Required ekg, labs, imaging. Will be admitted for further management.

## 2021-07-16 NOTE — CHART NOTE - NSCHARTNOTEFT_GEN_A_CORE
Patient stated that he wanted to go home and sign papers to leave. Threatening to rip out IV and walk out the door. I asked the patient what was wrong, he said that he hates this hospital and wants to leave. I explained to him in detail that he was very ill, requiring monitoring in a critical care setting, with severe pneumonia and requiring oxygen. I stated that if he left without the proper treatment, there is a high likelihood that he would die, or have some sort of injury. Patient verbazlied understanding, was able to teach back, and said that "if the Lord wants to take me that's how it is but I want to go home". RN Wendy witnessed. Explained to patient that if he goes home and feels worse to call 911 immediately. Patient verbalized understanding.

## 2021-07-16 NOTE — H&P ADULT - HISTORY OF PRESENT ILLNESS
66M with PMH of DM, HTN, DLD, MAICOL, ITP (w/recent BM bx), COPD no on home O2 presents w/4 days of SOB.    In the ED, pt  was febrile. Received IV Azithromycin and Ceftriaxone.     VITALS:   T(F): 96.6 (07-16 @ 07:23), Max: 101.9 (07-16 @ 04:20)  HR: 80 (07-16 @ 07:23) (80 - 126)  BP: 100/60 (07-16 @ 07:23) (100/60 - 100/62)  BP(mean): --  RR: 19 (07-16 @ 07:23) (16 - 20)  SpO2: 98% (07-16 @ 07:23) (89% - 100%) 66M with PMH of DM, HTN, DLD, MAICOL, ITP (w/recent BM bx), active smoker, COPD no on home O2 presents w/4 days of SOB. The pt states he has been waking up in the middle of the night, gasping for air which is new. He states his is unable to lay flat, always sleeps on his side. Pt does not take inhalers at home, does not follow up with pulmonary. Pt endorses one day of fevers at home    ROS negative for cough, wheezing, chest pain, palpitations, ab pain, n/v    In the ED, pt  was febrile. Received IV Azithromycin and Ceftriaxone. Pt was hypoxic, placed on 6L NC.     VITALS:   T(F): 96.6 (07-16 @ 07:23), Max: 101.9 (07-16 @ 04:20)  HR: 80 (07-16 @ 07:23) (80 - 126)  BP: 100/60 (07-16 @ 07:23) (100/60 - 100/62)  BP(mean): --  RR: 19 (07-16 @ 07:23) (16 - 20)  SpO2: 98% (07-16 @ 07:23) (89% - 100%)

## 2021-07-16 NOTE — CONSULT NOTE ADULT - SUBJECTIVE AND OBJECTIVE BOX
Patient is a 66y old  Male who presents with a chief complaint of sob (16 Jul 2021 10:39)      HPI:  66M with PMH of DM, HTN, DLD, MAICOL, ITP (w/recent BM bx), active smoker, COPD no on home O2 presents w/4 days of SOB. The pt states he has been waking up in the middle of the night, gasping for air which is new. He states his is unable to lay flat, always sleeps on his side. Pt does not take inhalers at home, does not follow up with pulmonary. Pt endorses one day of fevers at home  ROS negative for cough, wheezing, chest pain, palpitations, ab pain, n/v  In the ED, pt  was febrile. Received IV Azithromycin and Ceftriaxone. Pt was hypoxic, placed on 6L NC.     VITALS:   T(F): 96.6 (07-16 @ 07:23), Max: 101.9 (07-16 @ 04:20)  HR: 80 (07-16 @ 07:23) (80 - 126)  BP: 100/60 (07-16 @ 07:23) (100/60 - 100/62)    RR: 19 (07-16 @ 07:23) (16 - 20)  SpO2: 98% (07-16 @ 07:23) (89% - 100%) (16 Jul 2021 08:55)       PAST MEDICAL & SURGICAL HISTORY:  HTN (hypertension)    High cholesterol    Refarctory ITP    History of ear surgery    SOCIAL HISTORY: Smoker     FAMILY HISTORY:  FH: colon cancer (Father, Mother)      Allergies  Allergy Status Unknown  Intolerances      Height (cm): 172.7 (07-16-21 @ 03:33)  Weight (kg): 127 (07-16-21 @ 07:23)  BMI (kg/m2): 42.6 (07-16-21 @ 07:23)  BSA (m2): 2.36 (07-16-21 @ 07:23)      HOME MEDICATIONS:  amLODIPine 10 mg oral tablet: 1 tab(s) orally once a day (16 Jul 2021 11:06)  atorvastatin 40 mg oral tablet: 1 tab(s) orally once a day (07 Jun 2021 10:53)  Flonase 50 mcg/inh nasal spray: 1 spray(s) nasal once a day (07 Jun 2021 10:53)  hydroCHLOROthiazide 25 mg oral tablet: 1 tab(s) orally once a day (07 Jun 2021 10:53)  losartan 100 mg oral tablet: 1 tab(s) orally once a day (07 Jun 2021 10:53)  metFORMIN 1000 mg oral tablet: 1 tab(s) orally 2 times a day (21 May 2021 17:23)  predniSONE: 15 milligram(s) orally once a day (16 Jul 2021 11:05)  Trulicity Pen 1.5 mg/0.5 mL subcutaneous solution: subcutaneous once a week (07 Jun 2021 10:53)      Vital Signs Last 24 Hrs  T(C): 35.9 (16 Jul 2021 07:23), Max: 38.8 (16 Jul 2021 04:20)  T(F): 96.6 (16 Jul 2021 07:23), Max: 101.9 (16 Jul 2021 04:20)  HR: 80 (16 Jul 2021 07:23) (80 - 126)  BP: 100/60 (16 Jul 2021 07:23) (100/60 - 100/62)  RR: 19 (16 Jul 2021 07:23) (16 - 20)  SpO2: 98% (16 Jul 2021 07:23) (89% - 100%)    PHYSICAL EXAM  General: adult in NAD  HEENT: clear oropharynx, anicteric sclera, pink conjunctiva  Neck: supple  CV: normal S1/S2 with no murmur rubs or gallops  Lungs: b/l crackles, dec BS b/l  Abdomen: soft non-tender non-distended, no hepatosplenomegaly  Ext: no clubbing cyanosis or edema  Skin: no rashes and no petechiae  Neuro: alert and oriented X 4, no focal deficits    MEDICATIONS  (STANDING):  amLODIPine   Tablet 10 milliGRAM(s) Oral daily  atorvastatin 40 milliGRAM(s) Oral at bedtime  budesonide 160 MICROgram(s)/formoterol 4.5 MICROgram(s) Inhaler 2 Puff(s) Inhalation two times a day  enoxaparin Injectable 40 milliGRAM(s) SubCutaneous two times a day  hydrochlorothiazide 25 milliGRAM(s) Oral daily  levoFLOXacin IVPB      losartan 100 milliGRAM(s) Oral daily  methylPREDNISolone sodium succinate Injectable 30 milliGRAM(s) IV Push two times a day  pantoprazole    Tablet 40 milliGRAM(s) Oral before breakfast  trimethoprim  160 mG/sulfamethoxazole 800 mG 1 Tablet(s) Oral daily    MEDICATIONS  (PRN):  acetaminophen   Tablet .. 650 milliGRAM(s) Oral every 6 hours PRN Temp greater or equal to 38.5C (101.3F), Mild Pain (1 - 3)  albuterol/ipratropium for Nebulization 3 milliLiter(s) Nebulizer every 4 hours PRN Shortness of Breath and/or Wheezing  aluminum hydroxide/magnesium hydroxide/simethicone Suspension 30 milliLiter(s) Oral every 4 hours PRN Dyspepsia  melatonin 3 milliGRAM(s) Oral at bedtime PRN Insomnia  ondansetron Injectable 4 milliGRAM(s) IV Push every 8 hours PRN Nausea and/or Vomiting      LABS:                          12.6   9.05  )-----------( 240      ( 16 Jul 2021 04:20 )             40.9         Mean Cell Volume : 76.9 fL  Mean Cell Hemoglobin : 23.7 pg  Mean Cell Hemoglobin Concentration : 30.8 g/dL  Auto Neutrophil # : 7.30 K/uL  Auto Lymphocyte # : 0.85 K/uL  Auto Monocyte # : 0.51 K/uL  Auto Eosinophil # : 0.12 K/uL  Auto Basophil # : 0.09 K/uL  Auto Neutrophil % : 80.7 %  Auto Lymphocyte % : 9.4 %  Auto Monocyte % : 5.6 %  Auto Eosinophil % : 1.3 %  Auto Basophil % : 1.0 %      Serial CBC's  07-16 @ 04:20  Hct-40.9 / Hgb-12.6 / Plat-240 / RBC-5.32 / WBC-9.05      07-16    142  |  100  |  25<H>  ----------------------------<  141<H>  4.8   |  25  |  1.2    Ca    8.8      16 Jul 2021 04:20    TPro  6.0  /  Alb  3.3<L>  /  TBili  0.7  /  DBili  x   /  AST  51<H>  /  ALT  21  /  AlkPhos  49  07-16        RADIOLOGY & ADDITIONAL STUDIES:    < from: [facility] Duplex Lower Ext Vein Scan, Bilat (07.16.21 @ 10:55) >  Impression:    No evidence of deep venous thrombosis or superficial thrombophlebitis in the bilateral lower extremities.    < from: Xray Chest 1 View- PORTABLE-Routine (07.16.21 @ 05:13) >  Impression:    Bilateral opacities.

## 2021-07-16 NOTE — ED ADULT NURSE NOTE - NSIMPLEMENTINTERV_GEN_ALL_ED
Implemented All Fall Risk Interventions:  [place] to call system. Call bell, personal items and telephone within reach. Instruct patient to call for assistance. Room bathroom lighting operational. Non-slip footwear when patient is off stretcher. Physically safe environment: no spills, clutter or unnecessary equipment. Stretcher in lowest position, wheels locked, appropriate side rails in place. Provide visual cue, wrist band, yellow gown, etc. Monitor gait and stability. Monitor for mental status changes and reorient to person, place, and time. Review medications for side effects contributing to fall risk. Reinforce activity limits and safety measures with patient and family.

## 2021-07-16 NOTE — ED PROVIDER NOTE - NS ED ROS FT
Consitutional: No fever, chills  Cardiac:  No chest pain edema +SOB  Respiratory:  No cough, hemoptysis, or rhinorrhea  GI:  No abdominal pain, nausea, vomiting, constipation or diarrhea  MS:  No myalgia or back pain  Neuro:  No headache   Skin:  No skin rash

## 2021-07-16 NOTE — CONSULT NOTE ADULT - ASSESSMENT
IMPRESSION    B/L Atypical pneumonia -r/o PCP pneumonia (Immunocompromised state)  Refractory thrombocytopenia likely immune   - S/P IVIG and high dose steroids   - Completed Rituxan 4th weekly dose on 6/18/21   - Currently on Prednisone 15mg daily and Nplate 2mcg/kg weekly     - Current platelet count 248   - Bone marrow was aspicular, showed increased megakaryocytes but on Nplate; Cytogenetics and FISH negative    PLAN:  - Continue management of pna per primary team and pulmonary team  - Consider ID eval   - C/w Solumedrol per pulm team for now  - Monitor platelet count daily.     Will follow.  IMPRESSION    B/L Atypical pneumonia -r/o PCP pneumonia (Immunocompromised state)  Refractory thrombocytopenia likely immune   - S/P IVIG and high dose steroids   - Completed Rituxan 4th weekly dose on 6/18/21   - Currently on Prednisone 15mg daily and Nplate 2mcg/kg weekly     - Current platelet count 248   - Bone marrow was aspicular, showed increased megakaryocytes but on Nplate; Cytogenetics and FISH negative    PLAN:  - Continue management of pna per primary team and pulmonary team  - Consider ID eval   - C/w Solumedrol per pulm team for now  - F/U CT chest results  - Monitor platelet count daily.     Will follow.  IMPRESSION    B/L Atypical pneumonia -r/o PCP pneumonia (Immunocompromised state)  Refractory thrombocytopenia likely immune (see my allscript notes)      - Completed Rituxan 4th weekly dose on 6/18/21   - Currently on tapering Prednisone:from 1 mg/kg daily to 15mg daily (beginning 7.13.21) and   - Nplate 2mcg/kg weekly     - Current platelet count 248   - Bone marrow was aspicular, showed increased megakaryocytes but on Nplate; Cytogenetics and FISH negative  -PET/CT is scheduled as outpt    PLAN:  - Continue management of pna per primary team and pulmonary team  - Consider ID eval   - C/w Solumedrol per pulm team for now  - F/U CT chest results  - Monitor platelet count daily.     Will follow.

## 2021-07-16 NOTE — ED PROVIDER NOTE - ATTENDING CONTRIBUTION TO CARE
I personally evaluated the patient. I reviewed the Resident’s or Physician Assistant’s note (as assigned above), and agree with the findings and plan except as documented in my note.  Pt with hx of COPD, not on home O2 with complaints of SOB and associated hypoxia for 4 days. + increased coughing, no fever. VS reviewed, pt non-toxic appearing, NAD. Head ncat, MMM, B/l TM wnl. neck supple, normal ROM, normal s1s2 without any murmurs, Lungs CTAB with normal work of breathing. abd +BS, s/nd/nt, extremities wnl, neuro exam grossly normal. No acute skin rashes. Plan is placement of O2, ekg, labs, imaging, monitor and reassess.

## 2021-07-16 NOTE — H&P ADULT - ASSESSMENT
66M with PMH of DM, HTN, DLD, MAICOL, COPD/MAICOL, ITP presents for acute onset sob    # Acute hypoxic respiratory failure secondary to b/l PNA, COPD exacerbation   - pt hypoxic to mid-80s  - CXR shows b/l opacities   - sepsis r/o on admission   - pt febrile in  ED  - s/p IV azithromycin and Ceftriaxone. Continue  - Check urine legionella, strep,sputum cx   - duoneb q4 prn    # ITP, resolved  - s/p IVIG and prednisone, follows with hem/onc ( Dr Rondon)    # HTN  - c/w HCTZ, losartan and amlodipine    # DM  - c/w basal and nutritional insulin  - monitor BS    # DVT ppx: SCD  # PPI ppx: pantoprazole  # DIET: DASH, carb consistent  # ACTIVITY: IAT  # DISPO: From home                     66M with PMH of DM, HTN, DLD, MAICOL, COPD/MAICOL, ITP presents for acute onset sob    # Acute hypoxic respiratory failure secondary to b/l PNA  - VBG shows acute respiratory alkalosisl, bicarb is normal   - pt hypoxic to mid-80s  - CXR shows b/l opacities   - sepsis r/o on admission   - pt febrile in  ED  - s/p IV azithromycin and Ceftriaxone. Continue  - Check urine legionella, strep,sputum cx   - duoneb q4 prn    # ITP, resolved  - s/p IVIG and prednisone, follows with hem/onc ( Dr Rondon)    # HTN  - c/w HCTZ, losartan and amlodipine    # DM  - c/w basal and nutritional insulin  - monitor BS    # DVT ppx: SCD  # PPI ppx: pantoprazole  # DIET: DASH, carb consistent  # ACTIVITY: IAT  # DISPO: From home                     66M with PMH of DM, HTN, DLD, MAICOL, COPD/MAICOL, ITP presents for acute onset sob    # Acute hypoxic respiratory failure, poss secondary to b/l PNA  - VBG shows acute respiratory alkalosisl, bicarb is normal   - pt hypoxic to mid-80s  - Check TTE  -   - CXR shows b/l opacities   - sepsis r/o on admission   - pt febrile in  ED  - s/p IV azithromycin and Ceftriaxone. Continue  - Check urine legionella, strep,sputum cx   - duoneb q4 prn      # ITP, resolved  - s/p IVIG and prednisone, follows with hem/onc ( Dr Rodnon)  - pt on tapered dose of prednison, currently on 15 mg qd  - [facility]     # HTN  - c/w HCTZ, losartan and amlodipine    # DM  - c/w basal and nutritional insulin  - monitor BS    # DVT ppx: SCD  # PPI ppx: pantoprazole  # DIET: DASH, carb consistent  # ACTIVITY: IAT  # DISPO: From home                     66M with PMH of DM, HTN, DLD, MAICOL, COPD/MAICOL, ITP presents for acute onset sob    # Acute hypoxic respiratory failure, secondary to b/l PNA (atypical)  - VBG shows acute respiratory alkalosis, bicarb is normal   - pt hypoxic to mid-80s  - Check TTE  -   - CXR shows b/l opacities   - sepsis r/o on admission   - pt febrile in  ED  - s/p IV azithromycin and Ceftriaxone. Start Levaquin  mg qd  - PCP ppx with bactrim DS, pt has been on high dose steroids secondary to ITP  - Check urine legionella, strep,sputum cx   - keep pO2>92%  - pulmonary following     # COPD  - duoneb q4 prn  - Start symbicort   - keep pO2>92%      # ITP, resolved  - s/p IVIG and prednisone, follows with hem/onc ( Dr Rondon)  - pt on tapered dose of prednison, currently on 15 mg qd  - has appt with [facility] today, will c/s     # HTN  - c/w HCTZ, losartan and amlodipine    # DM  - c/w basal and nutritional insulin  - monitor BS    # DVT ppx: SCD  # PPI ppx: pantoprazole  # DIET: DASH, carb consistent  # ACTIVITY: IAT  # DISPO: From home

## 2021-07-16 NOTE — ED ADULT TRIAGE NOTE - CHIEF COMPLAINT QUOTE
pt c/o SOB that has been progressively worse for the last 4days that is worse when he is lying down. pt was hypoxic to mid 80s on scene.

## 2021-07-16 NOTE — ED PROVIDER NOTE - PHYSICAL EXAMINATION
CONSTITUTIONAL: Well-developed; well-nourished; in no acute distress.   SKIN: warm, dry  CARD:  Regular rate and rhythm.   RESP: No wheezes, rales or rhonchi. ctab tachypneic  ABD: soft ntnd  EXT: No cyanosis or edema.   NEURO: Alert, oriented, grossly unremarkable  PSYCH: Cooperative, appropriate.

## 2021-07-16 NOTE — CONSULT NOTE ADULT - SUBJECTIVE AND OBJECTIVE BOX
Patient is a 66y old  Male who presents with a chief complaint of sob (16 Jul 2021 08:55)      HPI:  66M with PMH of DM, HTN, DLD, MAICOL, ITP (w/recent BM bx), COPD no on home O2 presents w/4 days of SOB. Pt denies any associated cough, chest pain, but did endorse associated fever. Pt denies sick contacts or recent travel. Of note, pt has been on prednisone taper over past 2 months for ITP prescribed by hematologist. Pt denies any leg swelling or pain. denies recent long distance travel, or recent surgeries    In the ED, pt  was febrile. Received IV Azithromycin and Ceftriaxone.     VITALS:   T(F): 96.6 (07-16 @ 07:23), Max: 101.9 (07-16 @ 04:20)  HR: 80 (07-16 @ 07:23) (80 - 126)  BP: 100/60 (07-16 @ 07:23) (100/60 - 100/62)  BP(mean): --  RR: 19 (07-16 @ 07:23) (16 - 20)  SpO2: 98% (07-16 @ 07:23) (89% - 100%) (16 Jul 2021 08:55)      PAST MEDICAL & SURGICAL HISTORY:  HTN (hypertension)    High cholesterol    Acute ITP    History of ear surgery        SOCIAL HX:   current active smoker, no etoh abuse    FAMILY HISTORY:  FH: colon cancer (Father, Mother)    :  No known cardiovascular family history     Review Of Systems:     see HPI    Allergies    Allergy Status Unknown    Intolerances    PHYSICAL EXAM    ICU Vital Signs Last 24 Hrs  T(C): 35.9 (16 Jul 2021 07:23), Max: 38.8 (16 Jul 2021 04:20)  T(F): 96.6 (16 Jul 2021 07:23), Max: 101.9 (16 Jul 2021 04:20)  HR: 80 (16 Jul 2021 07:23) (80 - 126)  BP: 100/60 (16 Jul 2021 07:23) (100/60 - 100/62)  BP(mean): --  ABP: --  ABP(mean): --  RR: 19 (16 Jul 2021 07:23) (16 - 20)  SpO2: 98% (16 Jul 2021 07:23) (89% - 100%)      CONSTITUTIONAL:  Well nourished.  NAD    ENT:   Airway patent,   Mouth with normal mucosa.   No thrush    EYES:   pupils equal,   round and reactive to light.    CARDIAC:   Normal rate,   Regular rhythm.    Heart sounds S1, S2.   No edema    Vascular:   normal systolic impulse  no bruits    RESPIRATORY:   Bilateral faint crackles   Normal chest expansion  No use of accessory muscles    GASTROINTESTINAL:  Abdomen soft   Non-tender,   No guarding,   + BS    MUSCULOSKELETAL:   Range of motion is not limited,  No clubbing, cyanosis    NEUROLOGICAL:   Alert and oriented x3  No motor or sensory deficits.  Pertinent DTRs normal    SKIN:   Skin normal color for race,   Warm and dry  No evidence of rash.    PSYCHIATRIC:   Normal mood and affect.   No apparent risk to self or others.    HEME LYMPH:   No splenomegaly.  No cervical  lymphadenopathy.  No inguinal lymphadenopathy              LABS:                          12.6   9.05  )-----------( 240      ( 16 Jul 2021 04:20 )             40.9                                               07-16    142  |  100  |  25<H>  ----------------------------<  141<H>  4.8   |  25  |  1.2    Ca    8.8      16 Jul 2021 04:20    TPro  6.0  /  Alb  3.3<L>  /  TBili  0.7  /  DBili  x   /  AST  51<H>  /  ALT  21  /  AlkPhos  49  07-16                                                 CARDIAC MARKERS ( 16 Jul 2021 04:20 )  x     / <0.01 ng/mL / x     / x     / x                                                LIVER FUNCTIONS - ( 16 Jul 2021 04:20 )  Alb: 3.3 g/dL / Pro: 6.0 g/dL / ALK PHOS: 49 U/L / ALT: 21 U/L / AST: 51 U/L / GGT: x                                                   CXR interpreted by me: bilateral infiltrates    MEDICATIONS  (STANDING):  pantoprazole    Tablet 40 milliGRAM(s) Oral before breakfast    MEDICATIONS  (PRN):  acetaminophen   Tablet .. 650 milliGRAM(s) Oral every 6 hours PRN Temp greater or equal to 38.5C (101.3F), Mild Pain (1 - 3)  aluminum hydroxide/magnesium hydroxide/simethicone Suspension 30 milliLiter(s) Oral every 4 hours PRN Dyspepsia  melatonin 3 milliGRAM(s) Oral at bedtime PRN Insomnia  ondansetron Injectable 4 milliGRAM(s) IV Push every 8 hours PRN Nausea and/or Vomiting

## 2021-07-16 NOTE — H&P ADULT - NSHPLABSRESULTS_GEN_ALL_CORE
LABS:                        12.6   9.05  )-----------( 240      ( 16 Jul 2021 04:20 )             40.9     07-16    142  |  100  |  25<H>  ----------------------------<  141<H>  4.8   |  25  |  1.2    Ca    8.8      16 Jul 2021 04:20    TPro  6.0  /  Alb  3.3<L>  /  TBili  0.7  /  DBili  x   /  AST  51<H>  /  ALT  21  /  AlkPhos  49  07-16      Troponin T, Serum: <0.01 ng/mL (07-16-21 @ 04:20)    CARDIAC MARKERS ( 16 Jul 2021 04:20 )  x     / <0.01 ng/mL / x     / x     / x            Troponin trend:    Serum Pro-Brain Natriuretic Peptide: 177 pg/mL (07-16-21 @ 04:20)          RADIOLOGY:  -CXR:  -TTE:  -CCTA:  -STRESS TEST:  -CATHETERIZATION:    ECG:    TELEMETRY EVENTS:

## 2021-07-16 NOTE — ED PROVIDER NOTE - OBJECTIVE STATEMENT
66M hx ITP HTN HLD DM COPD not on home o2 who p/w 4d of sob. took tylenol 2 hrs PTA. denies fever but found to be febrile here. 66M hx ITP HTN HLD DM COPD not on home o2 who p/w 4d of sob. took tylenol 2 hrs PTA. denies fever but found to be febrile here. no recent travels, hx of blood clots, recent surgery. denies cp n v abd pain urinary or fecal issues.

## 2021-07-16 NOTE — CONSULT NOTE ADULT - ATTENDING COMMENTS
seen/examined w/ [facility] fellow  note reviewed  d/w dr Clifton
IMPRESSION:    Pneumonia  Possible PCP?  HO ITP recent IVIG currently on prolonged prednisone taper  Active smoker  HO COPD    Plan as outlined above

## 2021-07-17 LAB — PROCALCITONIN SERPL-MCNC: 0.33 NG/ML — HIGH (ref 0.02–0.1)

## 2021-07-20 ENCOUNTER — APPOINTMENT (OUTPATIENT)
Dept: HEMATOLOGY ONCOLOGY | Facility: CLINIC | Age: 66
End: 2021-07-20

## 2021-07-23 DIAGNOSIS — M19.90 UNSPECIFIED OSTEOARTHRITIS, UNSPECIFIED SITE: ICD-10-CM

## 2021-07-23 DIAGNOSIS — G47.33 OBSTRUCTIVE SLEEP APNEA (ADULT) (PEDIATRIC): ICD-10-CM

## 2021-07-23 DIAGNOSIS — Z80.0 FAMILY HISTORY OF MALIGNANT NEOPLASM OF DIGESTIVE ORGANS: ICD-10-CM

## 2021-07-23 DIAGNOSIS — J96.01 ACUTE RESPIRATORY FAILURE WITH HYPOXIA: ICD-10-CM

## 2021-07-23 DIAGNOSIS — I10 ESSENTIAL (PRIMARY) HYPERTENSION: ICD-10-CM

## 2021-07-23 DIAGNOSIS — E78.5 HYPERLIPIDEMIA, UNSPECIFIED: ICD-10-CM

## 2021-07-23 DIAGNOSIS — Z79.52 LONG TERM (CURRENT) USE OF SYSTEMIC STEROIDS: ICD-10-CM

## 2021-07-23 DIAGNOSIS — D69.3 IMMUNE THROMBOCYTOPENIC PURPURA: ICD-10-CM

## 2021-07-23 DIAGNOSIS — Z79.82 LONG TERM (CURRENT) USE OF ASPIRIN: ICD-10-CM

## 2021-07-23 DIAGNOSIS — E11.9 TYPE 2 DIABETES MELLITUS WITHOUT COMPLICATIONS: ICD-10-CM

## 2021-07-23 DIAGNOSIS — F17.210 NICOTINE DEPENDENCE, CIGARETTES, UNCOMPLICATED: ICD-10-CM

## 2021-07-23 DIAGNOSIS — J44.0 CHRONIC OBSTRUCTIVE PULMONARY DISEASE WITH (ACUTE) LOWER RESPIRATORY INFECTION: ICD-10-CM

## 2021-07-23 DIAGNOSIS — R06.02 SHORTNESS OF BREATH: ICD-10-CM

## 2021-07-23 DIAGNOSIS — J18.9 PNEUMONIA, UNSPECIFIED ORGANISM: ICD-10-CM

## 2021-07-23 DIAGNOSIS — Z79.84 LONG TERM (CURRENT) USE OF ORAL HYPOGLYCEMIC DRUGS: ICD-10-CM

## 2021-07-23 DIAGNOSIS — Z86.2 PERSONAL HISTORY OF DISEASES OF THE BLOOD AND BLOOD-FORMING ORGANS AND CERTAIN DISORDERS INVOLVING THE IMMUNE MECHANISM: ICD-10-CM

## 2021-08-19 ENCOUNTER — RX RENEWAL (OUTPATIENT)
Age: 66
End: 2021-08-19

## 2021-08-23 ENCOUNTER — RX RENEWAL (OUTPATIENT)
Age: 66
End: 2021-08-23

## 2021-11-19 RX ORDER — PANTOPRAZOLE 40 MG/1
40 TABLET, DELAYED RELEASE ORAL DAILY
Qty: 30 | Refills: 3 | Status: DISCONTINUED | COMMUNITY
Start: 2021-05-21 | End: 2021-11-19

## 2021-11-23 ENCOUNTER — RX RENEWAL (OUTPATIENT)
Age: 66
End: 2021-11-23

## 2023-06-06 NOTE — CONSULT LETTER
[Dear  ___] : Dear  [unfilled], [Consult Letter:] : I had the pleasure of evaluating your patient, [unfilled]. [Please see my note below.] : Please see my note below. [facility] [place]

## 2024-03-27 NOTE — HISTORY OF PRESENT ILLNESS
[de-identified] : Mr. Dalal is a 66 year old male with PMH of DM, HTN, DLD, MAICOL who was recently admitted for suspected ITP. Patient was noted to have asymptomatic thrombocytopenia as an outpatient and referred to the ED. In the ED his platelet count was 5K with normal WBC and Hg. His smear was reviewed and showed 1-2 schistocytes, no clumping, large platelets, decrease number of platelets, normal WBC morphology/no blasts, some target cells. He was started on prednisone 1 mg/kg of prednisone, and he received two doses of IVIG. His platelets improved to 38 and he was discharged on prednisone. \par Of note, his platelets were 106 in June 2020, ad then 53 in January 2021. Also in January and then in February he received the Moderna vaccine. He does have high RBC count (6.45) and low MCV (73).\par Workup in the hospital showed normal US spleen, no evidence of hemolysis, normal LDH, hapto and ret count, HIV, HepB, HepC EBV IgM, CMV IgM NR , HIT negative , RF negative, peripheral flow negative\par \par Patient informed us he was not discharged with needles for his insulin. His fingersticks have in the low 200s. \par His platelets today are 116. No bleeding, bruising or petechiae. \par \par 5/14/2021- pt returns for follow up today.  feeling well.  some mild bruising where labs drawn otherwise no other bruising.  wbc 10.49 hgb 13.5 hct 23.0 platelets now 133. \par pt made f/up appt with new fer Doss for this monday 5/17/2021. P t checking FS at home sugar 130 today.   [FreeTextEntry1] : Started weekly Ritxuan on 5.28.2021.  Completed on 6.18.2021.   [de-identified] : 5/18/21: Mr. Dalal returns for followup. Since Saturday he has been taking 100mg prednsone daily, he was on 130mg prior to this. No evidence of bleeding, petechiae. His platelets dropped to 28K. We reviewed the smear and there was no clumping, 2-3 platelets per HPF. We told him to take an additional 30mg today.\par \par 5/21/21\par Patient is here for a follow-up visit for immune thrombocytopenia.  He is feeling well with no new complaints.  Reviewed most recent CBC, which shows PLTs are down to 12,000 from 28,000 earlier this week.  His PLT dropped even with increase of prednisone back up to 130mg daily.  Patient denies easier bruising or bleeding.  \par \par 5/26/21\par Patient is here for a follow-up visit for immune thrombocytopenia.  He is feeling well with no new complaints.  Patient denies easier bruising or bleeding.  Since last visit, he was sent to ER for complete workup and received IVIG x 2 days.  During hospital stay, we offered BMBx and to start Rituxan, but patient deferred at that time.  He has been taking Prednisone 130mg daily.  Reviewed most recent CBC which shows PLTs are down to 35,000 from 51,000 earlier this week.  \par \par 5/28/2021- Pt  is here for a follow-up visit for immune thrombocytopenia.  He is feeling well with no new complaints.  Patient denies easier bruising or bleeding.  No Headache no diziness no weakness.   pt was continued on prednisone.  Since last visit, his platelets went from  35,000 to 15,000\par ITP  refractory to current treatment; Discussed risks and benefits of Rituxan including side effects such as fatigue, diarrhea, rashes, hepatitis B reactivation, allergic reactions and death to name a few. Consent obtained.  \par \par 6/1/2021: Patient presents for followup. He started rituxan Friday, 5/28 after decreasing even with increase in steroids. His direct emelia is positive, IgG+, C3 negative.  Hg 13.0, retic is 2.7%. His platelets dropped to 7K. WIll start Romiplastin today.  \par \par 6/4/2021- Patient returns for follow up today for Immune Thrombocytopenia.  Pt was seen on Tuesday 6/1/2021 and Platelets were 7K.  Pt was started on Romiplastin.  He is feeling well.  Denies any bleeding no fatigue no rashes.  CBC done today Platelets now 29K. Pt remains on prednisone no swelling no insomnia. Pt has been trying to walk feeling well, loosing weight.  \par \par 6/8/2021: Mr Dalal returns for followup of ITP. His platelets are now 145. He was started on romiplastim last week and he is continuing with rituxan weekly and prednisone.  He denies bleeding, petechiae, rash, melena, BRBPR\par \par 6/11/21\par Pt is here today for follow up visit for ITP.  His platelets improved, now 163. He was started on romiplastim 6/1/21 and he is continuing with rituxan weekly and prednisone.  He denies bleeding, petechiae, rash, melena, BRBPR.  He is scheduled for bone marrow bx on 6/14/21 at [facility] and CT scan C/A/P with contrast on 6/17/21.\par \par 6/18/21\par Pt is here today for follow up visit for ITP.  His platelets improved, now 308 from 237, 163) He was started on romiplastim 6/1/21 and he is continuing with rituxan weekly and prednisone. Today is his 4th Rituxan.  He is taking Prednisone 90 mg PO daily. He denies bleeding, petechiae, rash, melena, BRBPR.  \par Bone marrow bx was done at [facility] on 6/14/21- result pending.  CT scan C/A/P with contrast done yesterday- result pending.\par \par 6/22/21:\par Completed rituxan on 6/18. Tapered to 70mg from 90mg since Saturday . Platelts today are 145, platelets were 340 on Friday. We will increase Nplate to 3mcg/kg and continue to taper steroids. \par \par CT Chest 6/17\par IMPRESSION:\par Decreased size of bilateral axillary lymph nodes now measuring up to 1.3 cm on the left. No mediastinal or hilar lymphadenopathy.\par No CT evidence of an acute intrathoracic pathological process.\par Evidence of multivessel coronary arterial disease.\par Please see separately dictated CT abdomen and pelvis for additional findings.\par \par IMPRESSION:\par Mildly enlarged right retrocrural lymph nodes measuring up to 1.2 cm.\par Infiltrative changes within the retroperitoneum at the level of the aortic bifurcation.\par Above findings are nonspecific, but lymphomatous disease is not excluded\par Indeterminate left upper pole renal lesion with questionable mural nodule. Follow-up CT renal mass protocol is recommended in 3 months to demonstrate stability\par \par DIscussed that were non-specific findings on CT CAP, to be further discussed with his primary hematologist. \par \par BONE MARROW BX done 6/14TH: Pathology recommended repeat biopsys; Molecular analysis pending, cytogenetics pending, FISH pending, \par \par 6/25/21\par Patient is here for a follow-up visit for ITP.   He completed weekly Rituxan on 6.18.2021.  He has been receiving weekly N-plate which was titrated up to 3mcg/kg last week.  He remains on 60mg prednisone daily.  Reviewed most recent CBC which shows leukocytosis (likely 2/2 steroid), mild anemia and reoccurrence of thrombocytopenia.  PLTs dropped to 73,000 from 145,000 last week.  He denies any fevers, chills, abdominal pain, early satiety or bleeding.  He notes same old bruising on extremities but nothing new. \par \par 6/29/21\par Mr. Dalal returns for followup. On Friday his platelets dropped to 73, he remained on Nplate 3mcg/kg and 60 mg at that time. Today his platelets improved to 150. We discussed that he had a few enlarged lymph nodes on CT and recommended PET. Also recommended to decrease prednisone to 40mg QD and continue current Nplate dose.  n/a

## 2024-05-21 ENCOUNTER — LABORATORY RESULT (OUTPATIENT)
Age: 69
End: 2024-05-21
